# Patient Record
Sex: MALE | Race: BLACK OR AFRICAN AMERICAN | Employment: OTHER | URBAN - METROPOLITAN AREA
[De-identification: names, ages, dates, MRNs, and addresses within clinical notes are randomized per-mention and may not be internally consistent; named-entity substitution may affect disease eponyms.]

---

## 2017-02-19 ENCOUNTER — HOSPITAL ENCOUNTER (EMERGENCY)
Age: 77
Discharge: HOME | End: 2017-02-19
Attending: EMERGENCY MEDICINE
Payer: MEDICARE

## 2017-02-19 ENCOUNTER — APPOINTMENT (OUTPATIENT)
Dept: GENERAL RADIOLOGY | Age: 77
End: 2017-02-19
Attending: EMERGENCY MEDICINE
Payer: MEDICARE

## 2017-02-19 VITALS
HEIGHT: 70 IN | DIASTOLIC BLOOD PRESSURE: 81 MMHG | SYSTOLIC BLOOD PRESSURE: 161 MMHG | RESPIRATION RATE: 20 BRPM | BODY MASS INDEX: 20.76 KG/M2 | WEIGHT: 145 LBS | OXYGEN SATURATION: 98 % | TEMPERATURE: 98.1 F | HEART RATE: 88 BPM

## 2017-02-19 DIAGNOSIS — S42.402A OCCULT FRACTURE OF ELBOW, LEFT, CLOSED, INITIAL ENCOUNTER: Primary | ICD-10-CM

## 2017-02-19 PROCEDURE — 99283 EMERGENCY DEPT VISIT LOW MDM: CPT

## 2017-02-19 PROCEDURE — 74011250637 HC RX REV CODE- 250/637: Performed by: EMERGENCY MEDICINE

## 2017-02-19 PROCEDURE — 73080 X-RAY EXAM OF ELBOW: CPT

## 2017-02-19 PROCEDURE — 75810000053 HC SPLINT APPLICATION

## 2017-02-19 RX ORDER — HYDROCODONE BITARTRATE AND ACETAMINOPHEN 5; 325 MG/1; MG/1
1 TABLET ORAL
Status: COMPLETED | OUTPATIENT
Start: 2017-02-19 | End: 2017-02-19

## 2017-02-19 RX ORDER — HYDROCODONE BITARTRATE AND ACETAMINOPHEN 5; 325 MG/1; MG/1
TABLET ORAL
Qty: 16 TAB | Refills: 0 | Status: SHIPPED | OUTPATIENT
Start: 2017-02-19 | End: 2019-04-05

## 2017-02-19 RX ADMIN — HYDROCODONE BITARTRATE AND ACETAMINOPHEN 1 TABLET: 5; 325 TABLET ORAL at 14:00

## 2017-02-19 NOTE — ED TRIAGE NOTES
Price on Wednesday landing on left elbow with increasing pain and decreasing range of motion noted to left elbow and arm. Strong radial pulse.

## 2017-02-19 NOTE — LETTER
NOTIFICATION RETURN TO WORK / SCHOOL 
 
2/19/2017 2:23 PM 
 
Mr. Drinda Dance [address] [place] [id number] To Whom It May Concern: 
 
Drinda Dance is currently under the care of [address] [facility] EMERGENCY DEPT. He will return to work/school on: 2/21/17 If there are questions or concerns please have the patient contact our office. Sincerely, 
 
 
Dr. Chris Merino

## 2017-02-19 NOTE — DISCHARGE INSTRUCTIONS
Broken Elbow: Care Instructions  Your Care Instructions  A fractured elbow means that a bone has broken in or near the joint. Broken bones (fractures) can range from a small, hairline crack, to a bone or bones broken into two or more pieces. Your treatment depends on how bad the break is. Your doctor may have put your arm in a cast or splint to allow your elbow to heal or to keep it stable until you see another doctor. You also may wear a sling to help support your arm. It may take weeks or months for your elbow to heal. You can help it heal with some care at home. You heal best when you take good care of yourself. Eat a variety of healthy foods, and don't smoke. You may have had a sedative to help you relax. You may be unsteady after having sedation. It can take a few hours for the medicine's effects to wear off. Common side effects of sedation include nausea, vomiting, and feeling sleepy or tired. The doctor has checked you carefully, but problems can develop later. If you notice any problems or new symptoms, get medical treatment right away. Follow-up care is a key part of your treatment and safety. Be sure to make and go to all appointments, and call your doctor if you are having problems. It's also a good idea to know your test results and keep a list of the medicines you take. How can you care for yourself at home? · If the doctor gave you a sedative:  ¨ For 24 hours, don't do anything that requires attention to detail. It takes time for the medicine's effects to completely wear off. ¨ For your safety, do not drive or operate any machinery that could be dangerous. Wait until the medicine wears off and you can think clearly and react easily. · Put ice or a cold pack on your arm for 10 to 20 minutes at a time. Try to do this every 1 to 2 hours for the next 3 days (when you are awake). Put a thin cloth between the ice and your cast or splint. Keep your cast or splint dry.   · Follow the cast care instructions your doctor gives you. If you have a splint, do not take it off unless your doctor tells you to. · Be safe with medicines. Take pain medicines exactly as directed. ¨ If the doctor gave you a prescription medicine for pain, take it as prescribed. ¨ If you are not taking a prescription pain medicine, ask your doctor if you can take an over-the-counter medicine. · Prop up your arm on pillows when you sit or lie down in the first few days after the injury. Keep your elbow higher than the level of your heart. This will help reduce swelling. · Follow instructions for exercises to keep your arm strong. · Wiggle your fingers and wrist often to reduce swelling and stiffness. When should you call for help? Call 911 anytime you think you may need emergency care. For example, call if:  · You have trouble breathing. · You passed out (lost consciousness). Call your doctor now or seek immediate medical care if:  · You have new or worse nausea or vomiting. · You have increased or severe pain. · Your hand is cool or pale or changes color. · You have tingling, weakness, or numbness in your hand or fingers. · Your cast or splint feels too tight. · You cannot move your fingers or have trouble moving your fingers. · The skin under your cast or splint is burning or stinging. Watch closely for changes in your health, and be sure to contact your doctor if:  · You do not get better as expected. Where can you learn more? Go to http://carolyn-nicole.info/. Enter G763 in the search box to learn more about \"Broken Elbow: Care Instructions. \"  Current as of: May 23, 2016  Content Version: 11.1  © 4570-7505 [employer]. Care instructions adapted under license by [employer] (which disclaims liability or warranty for this information).  If you have questions about a medical condition or this instruction, always ask your healthcare professional. Tito Fulton disclaims any warranty or liability for your use of this information.

## 2017-02-19 NOTE — ED PROVIDER NOTES
HPI Comments: 1:35 PM Tangela Lewis is a 68 y.o. male with h/o HTN, arthritis, high cholesterol and chronic constipation who presents to ED complaining of R hand and L elbow pain onset yesterday. He notes some R hand swelling onset yesterday. He states the R hand pain subsided today. Pt states he only has L elbow pain when he moves his L arm. 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 He states he slipped on the tile in the bathroom and landed on his L side. He denies hitting his head or LOC. Pt took Tylenol this morning w/o relief. Pt denies dizziness, lightheadedness, CP, SOB, back pain or neck pain. No other concerns nor complaints at this time. PCP: Mary Arce MD      The history is provided by the patient. Past Medical History:   Diagnosis Date    Arthritis     Chronic constipation     High cholesterol     Hypertension        Past Surgical History:   Procedure Laterality Date    Hx heent           No family history on file. Social History     Social History    Marital status:      Spouse name: N/A    Number of children: N/A    Years of education: N/A     Occupational History    Not on file. Social History Main Topics    Smoking status: Former Smoker    Smokeless tobacco: Not on file    Alcohol use No    Drug use: No    Sexual activity: Not on file     Other Topics Concern    Not on file     Social History Narrative    No narrative on file         ALLERGIES: Review of patient's allergies indicates no known allergies. Review of Systems   Constitutional: Negative for chills, fatigue, fever and unexpected weight change. HENT: Negative for congestion and rhinorrhea. Respiratory: Negative for chest tightness and shortness of breath. Cardiovascular: Negative for chest pain, palpitations and leg swelling. Gastrointestinal: Negative for abdominal pain, nausea and vomiting. Genitourinary: Negative for dysuria. Musculoskeletal: Negative for back pain. R hand swelling, R hand and L elbow pain   Skin: Negative for rash. Neurological: Negative for dizziness and weakness. Psychiatric/Behavioral: The patient is not nervous/anxious. All other systems reviewed and are negative. There were no vitals filed for this visit. Physical Exam   Constitutional: He appears well-developed and well-nourished. Non-toxic appearance. He does not have a sickly appearance. He does not appear ill. No distress. HENT:   Head: Normocephalic and atraumatic. Mouth/Throat: Oropharynx is clear and moist. No oropharyngeal exudate. Eyes: Conjunctivae and EOM are normal. Pupils are equal, round, and reactive to light. No scleral icterus. Neck: Normal range of motion. Neck supple. No hepatojugular reflux and no JVD present. No tracheal deviation present. No thyromegaly present. Cardiovascular: Normal rate, regular rhythm, S1 normal, S2 normal, normal heart sounds, intact distal pulses and normal pulses. Exam reveals no gallop, no S3 and no S4. No murmur heard. Pulses:       Radial pulses are 2+ on the right side, and 2+ on the left side. Dorsalis pedis pulses are 2+ on the right side, and 2+ on the left side. Pulmonary/Chest: Effort normal and breath sounds normal. No accessory muscle usage. No tachypnea. No respiratory distress. He has no decreased breath sounds. He has no wheezes. He has no rhonchi. He has no rales. Abdominal: Soft. Normal appearance and bowel sounds are normal. He exhibits no distension and no mass. There is no hepatosplenomegaly. There is no tenderness. There is no rigidity, no rebound, no guarding, no CVA tenderness, no tenderness at McBurney's point and negative North's sign. Musculoskeletal:        Left elbow: He exhibits decreased range of motion, swelling and effusion. He exhibits no deformity and no laceration. Tenderness found. Olecranon process tenderness noted.  No radial head, no medial epicondyle and no lateral epicondyle tenderness noted. Strength 5/5 throughout with exception of left elbow extension secondary to pain    Lymphadenopathy:        Head (right side): No submental, no submandibular, no preauricular and no occipital adenopathy present. Head (left side): No submental, no submandibular, no preauricular and no occipital adenopathy present. He has no cervical adenopathy. Right: No supraclavicular adenopathy present. Left: No supraclavicular adenopathy present. Neurological: He is alert. He has normal strength and normal reflexes. He is not disoriented. No cranial nerve deficit or sensory deficit. Coordination and gait normal. GCS eye subscore is 4. GCS verbal subscore is 5. GCS motor subscore is 6. Grossly intact    Skin: Skin is warm, dry and intact. No rash noted. He is not diaphoretic. Psychiatric: He has a normal mood and affect. His speech is normal and behavior is normal. Judgment and thought content normal. Cognition and memory are normal.   Nursing note and vitals reviewed. MDM  Number of Diagnoses or Management Options  Occult fracture of elbow, left, closed, initial encounter:   Diagnosis management comments: Elbow contusion vs fracture     ED Course       Procedures   XR of the L elbow showed posterior and anterior sail sign. Consistent with an occult fracture. Splint was applied to the L elbow by ED tech. Splint was checked after application. Extremity neurovascular intact prior and after splint placement. I have reassessed the patient. Patient is feeling better. Patient will be prescribed Vicodin. Patient was discharged in stable condition. Patient is to return to emergency department if any new or worsening condition.       Scribe Attestation  Mallika Smith scribing for and in the presence of Reza Avery DO (02/19/17/ 1:35 PM)    Physician Attestation  I personally performed the services described in this documentation, reviewed, and edited the documentation which was dictated to the scribe in my presence, and it accurately records my own words and actions.      Johny Mason DO (02/19/17/ 1:35 PM)    Signed by: William Mccord, 02/19/17, 1:35 PM

## 2017-02-21 ENCOUNTER — APPOINTMENT (OUTPATIENT)
Dept: GENERAL RADIOLOGY | Age: 77
End: 2017-02-21
Attending: EMERGENCY MEDICINE
Payer: MEDICARE

## 2017-02-21 ENCOUNTER — HOSPITAL ENCOUNTER (EMERGENCY)
Age: 77
Discharge: HOME | End: 2017-02-21
Attending: EMERGENCY MEDICINE
Payer: MEDICARE

## 2017-02-21 VITALS
BODY MASS INDEX: 22.76 KG/M2 | SYSTOLIC BLOOD PRESSURE: 142 MMHG | DIASTOLIC BLOOD PRESSURE: 88 MMHG | HEART RATE: 92 BPM | RESPIRATION RATE: 18 BRPM | OXYGEN SATURATION: 95 % | HEIGHT: 67 IN | TEMPERATURE: 98.4 F | WEIGHT: 145 LBS

## 2017-02-21 DIAGNOSIS — S62.92XA HAND FRACTURE, LEFT, CLOSED, INITIAL ENCOUNTER: Primary | ICD-10-CM

## 2017-02-21 PROCEDURE — 73130 X-RAY EXAM OF HAND: CPT

## 2017-02-21 PROCEDURE — 99283 EMERGENCY DEPT VISIT LOW MDM: CPT

## 2017-02-21 PROCEDURE — 75810000053 HC SPLINT APPLICATION

## 2017-02-21 PROCEDURE — 74011250637 HC RX REV CODE- 250/637: Performed by: EMERGENCY MEDICINE

## 2017-02-21 RX ORDER — HYDROCODONE BITARTRATE AND ACETAMINOPHEN 5; 325 MG/1; MG/1
1 TABLET ORAL
Status: COMPLETED | OUTPATIENT
Start: 2017-02-21 | End: 2017-02-21

## 2017-02-21 RX ADMIN — HYDROCODONE BITARTRATE AND ACETAMINOPHEN 1 TABLET: 5; 325 TABLET ORAL at 16:44

## 2017-02-21 NOTE — ED TRIAGE NOTES
Patient states swelling to left hand. States prior visit related to pain to left elbow. Patient arrives with sling in place to left arm. Patient educated on proper sling placement. Swelling noted to hand.

## 2017-02-21 NOTE — ED PROVIDER NOTES
Patient is a 68 y.o. male presenting with hand swelling. The history is provided by the patient and the spouse. Hand Swelling    Pertinent negatives include no numbness. pt is c/o persistent left hand painful swelling since falling at [employer] 2/18/17. Left elbow fracture diagnosed at ED visit but hand still symptomatic. Hasn't been able to f/up with ortho yet b/c of [employer] manager and insurance not communicating properly yet. Incident report filed. In sling and left elbow feels better since splint placed. Pain is primarily at thumb, MCP joint. Left pinky PIP joint disfigured from long ago dog leash accident. Denies additional trauma, rash. Has Ibuprofen and Vicodin. Denies ETOH, tobacco, illicits. Past Medical History:   Diagnosis Date    Arthritis     Chronic constipation     High cholesterol     Hypertension        Past Surgical History:   Procedure Laterality Date    Hx heent           History reviewed. No pertinent family history. Social History     Social History    Marital status:      Spouse name: N/A    Number of children: N/A    Years of education: N/A     Occupational History    Not on file. Social History Main Topics    Smoking status: Former Smoker    Smokeless tobacco: Not on file    Alcohol use No    Drug use: No    Sexual activity: Not on file     Other Topics Concern    Not on file     Social History Narrative         ALLERGIES: Review of patient's allergies indicates no known allergies. Review of Systems   Constitutional: Negative for fever. Musculoskeletal: Positive for arthralgias and joint swelling. Neurological: Negative for weakness and numbness. All other systems reviewed and are negative.       Vitals:    02/21/17 1446   BP: 142/88   Pulse: 92   Resp: 18   Temp: 98.4 °F (36.9 °C)   SpO2: 95%   Weight: 65.8 kg (145 lb)   Height: 5' 7\" (1.702 m)            Physical Exam   Constitutional: Vital signs are normal. He appears well-developed and well-nourished. He is active. Non-toxic appearance. He does not appear ill. No distress. HENT:   Head: Normocephalic and atraumatic. Neck: Normal range of motion. Neck supple. Carotid bruit is not present. No tracheal deviation present. No thyromegaly present. Cardiovascular: Normal rate, regular rhythm and normal heart sounds. Exam reveals no gallop and no friction rub. No murmur heard. Pulmonary/Chest: Effort normal and breath sounds normal. No stridor. No respiratory distress. He has no wheezes. He has no rales. He exhibits no tenderness. Abdominal: Soft. He exhibits no distension and no mass. There is no tenderness. There is no rebound, no guarding and no CVA tenderness. Musculoskeletal: He exhibits edema and tenderness. Left hand: dorsal distal first metacarpal TTP. Diminished ROM at this joint. Cap refill <2 sec; sensation intact throughout. Moderate swelling. Neurological: He is alert. Skin: Skin is warm, dry and intact. He is not diaphoretic. No pallor. Psychiatric: He has a normal mood and affect. His speech is normal and behavior is normal. Judgment and thought content normal.   Nursing note and vitals reviewed. MDM  Number of Diagnoses or Management Options  Hand fracture, left, closed, initial encounter:   Diagnosis management comments: Differential: fx; dislocation; contusion; sprain; tendon injury    Splint; distal first metacarpal fx on film. Refer to ortho. Splint applied by tech and nurse to left hand; excellent position. N/v intact before and after application. Amount and/or Complexity of Data Reviewed  Tests in the radiology section of CPT®: ordered and reviewed      ED Course       Procedures    4:04 PM  Diagnosis:   1.  Hand fracture, left, closed, initial encounter          Disposition: home    Follow-up Information     Follow up With Details Comments [address] Schedule an appointment as soon as possible for a visit in 1 day  [address] [place] [address] EMERGENCY DEPT  If symptoms worsen return immediately [address] [facility]  [phone]          Patient's Medications   Start Taking    No medications on file   Continue Taking    ASPIRIN 81 MG CHEWABLE TABLET    Take 81 mg by mouth daily. HYDROCODONE-ACETAMINOPHEN (NORCO) 5-325 MG PER TABLET    Take 1-2 tablets PO every 4-6 hours as needed for pain control. If over the counter ibuprofen or acetaminophen was suggested, then only take the vicodin for pain not well controlled with the over the counter medication. LOSARTAN-HYDROCHLOROTHIAZIDE (HYZAAR) 100-25 MG PER TABLET    Take 1 tablet by mouth daily. Indications: HYPERTENSION    PRAVASTATIN (PRAVACHOL) 40 MG TABLET    Take 40 mg by mouth nightly.  Indications: HYPERCHOLESTEROLEMIA   These Medications have changed    No medications on file   Stop Taking    No medications on file

## 2017-02-22 ENCOUNTER — OFFICE VISIT (OUTPATIENT)
Dept: ORTHOPEDIC SURGERY | Age: 77
End: 2017-02-22

## 2017-02-22 VITALS
WEIGHT: 145 LBS | BODY MASS INDEX: 22.76 KG/M2 | DIASTOLIC BLOOD PRESSURE: 79 MMHG | SYSTOLIC BLOOD PRESSURE: 165 MMHG | HEIGHT: 67 IN | TEMPERATURE: 97.8 F | HEART RATE: 83 BPM

## 2017-02-22 DIAGNOSIS — S63.92XD HAND SPRAIN, LEFT, SUBSEQUENT ENCOUNTER: ICD-10-CM

## 2017-02-22 DIAGNOSIS — S52.125A CLOSED NONDISPLACED FRACTURE OF HEAD OF LEFT RADIUS, INITIAL ENCOUNTER: Primary | ICD-10-CM

## 2017-02-22 DIAGNOSIS — M19.042 PRIMARY OSTEOARTHRITIS OF LEFT HAND: ICD-10-CM

## 2017-02-22 NOTE — PROGRESS NOTES
Drinda Dance  1940   Chief Complaint   Patient presents with    Elbow Pain     left    Hand Pain     left        HISTORY OF PRESENT ILLNESS  Drinda Dance is a 68 y.o. male who presents today for evaluation of left hand pain and swelling and left elbow fracture. He rates his pain 2/10 today. He recalls he fell on 2/15/17 but states he didn't have pain and swelling until 2/18. He was seen in the ED where XRs were taken and a splint was applied. He also c/o pain and swelling in the left wrist.    No Known Allergies     Past Medical History:   Diagnosis Date    Arthritis     Chronic constipation     High cholesterol     Hypertension       Social History     Social History    Marital status:      Spouse name: N/A    Number of children: N/A    Years of education: N/A     Occupational History    Not on file. Social History Main Topics    Smoking status: Former Smoker    Smokeless tobacco: Not on file    Alcohol use No    Drug use: No    Sexual activity: Not on file     Other Topics Concern    Not on file     Social History Narrative      Past Surgical History:   Procedure Laterality Date    HX HEENT        History reviewed. No pertinent family history. Current Outpatient Prescriptions   Medication Sig    HYDROcodone-acetaminophen (NORCO) 5-325 mg per tablet Take 1-2 tablets PO every 4-6 hours as needed for pain control. If over the counter ibuprofen or acetaminophen was suggested, then only take the vicodin for pain not well controlled with the over the counter medication.  losartan-hydrochlorothiazide (HYZAAR) 100-25 mg per tablet Take 1 tablet by mouth daily. Indications: HYPERTENSION    pravastatin (PRAVACHOL) 40 mg tablet Take 40 mg by mouth nightly. Indications: HYPERCHOLESTEROLEMIA    aspirin 81 mg chewable tablet Take 81 mg by mouth daily. No current facility-administered medications for this visit.         REVIEW OF SYSTEM   Patient denies: Weight loss, Fever/Chills, HA, Visual changes, Fatigue, Chest pain, SOB, Abdominal pain, N/V/D/C, Blood in stool or urine, Edema. Pertinent positive as above in HPI. All others were negative    PHYSICAL EXAM:   Visit Vitals    /79    Pulse 83    Temp 97.8 °F (36.6 °C)    Ht 5' 7\" (1.702 m)    Wt 145 lb (65.8 kg)    BMI 22.71 kg/m2     The patient is a well-developed, well-nourished male   in no acute distress. The patient is alert and oriented times three. The patient is alert and oriented times three. Mood and affect are normal.  LYMPHATIC: lymph nodes are not enlarged and are within normal limits  SKIN: normal in color and non tender to palpation. There are no bruises or abrasions noted. NEUROLOGICAL: Motor sensory exam is within normal limits. Reflexes are equal bilaterally. There is normal sensation to pinprick and light touch  MUSCULOSKELETAL:  Examination Left Hand   Skin Intact   Deformity -   Swelling ++   Tenderness -   Tenderness A1 Pulley -   Finger flexion limited   Finger extension limited   Thenar [place] Atrophy -   Sensation Normal   Capillary refill -   Heberden's nodes -   Dupuytren's -     Examination Left Wrist   Skin Intact   Tenderness +   Flexion 30   Extension 30   Deformity -   Effusion -   Tinnel's sign -   Phalen's test -   Finklestein maneuver -   Pain with thumb abduction -       Examination Left Elbow   Skin Intact   Range of Motion 30-60   Tenderness Medial Epicondyle -   Tenderness Lateral Epicondyle +   Tenderness Olecranon Bursa -   Swelling +   Bruising +   Stability Normal   Motor Strength  Normal   Neurovascular Intact         IMAGING:   XR of the left hand dated 2/21/17 was reviewed and read:   IMPRESSION:   No acute fracture. Joint space narrowing and articular surface irregularity with  some erosions at the first metacarpophalangeal joint, differential include  inflammatory/crystalline or infectious arthropathy or possibly remote  posttraumatic osteoarthrosis.  Less pronounced arthritic changes in the second  MCP joint which is probably similar process. Diffuse soft tissue swelling. No  soft tissue gas. Please correlate clinically for possibility of septic arthritis  at the first MCP joint. Chondrocalcinosis in the wrist may be related to chronic osteoarthrosis or CPPD. XR of the left elbow dated 2/19/17 was reviewed and read:   IMPRESSION[de-identified] Fracture radial head with presumed hemarthrosis. Tiny ossific density posteriorly at the olecranon process could be a tiny  avulsion injury or related to tendinosis. Dorsal soft tissue swelling. IMPRESSION:      ICD-10-CM ICD-9-CM    1. Closed nondisplaced fracture of head of left radius, initial encounter S52.125A 813.05 REFERRAL TO OCCUPATIONAL THERAPY   2. Hand sprain, left, subsequent encounter S63. 92XD V58.89      842.10    3. Hand arthritis M12.9 716.94         PLAN: He will wear a sling intermittently for the next week. He was instructed to also ice and elevate the elbow and wrist. I will send him to OT to work on passive and active ROM. I will see him back in three weeks for reevaluation.   Follow-up Disposition: Not on File    Scribed by [facility]) as dictated by MD Kyleigh Goldsmith M.D.   [facility]

## 2017-02-23 ENCOUNTER — TELEPHONE (OUTPATIENT)
Dept: ORTHOPEDIC SURGERY | Age: 77
End: 2017-02-23

## 2017-02-23 NOTE — TELEPHONE ENCOUNTER
Zak Edgar (wokers comp ) is calling to check the status to see if pt has been released to go back to work or not, the notes he was sent do not say.

## 2017-02-24 ENCOUNTER — TELEPHONE (OUTPATIENT)
Dept: ORTHOPEDIC SURGERY | Age: 77
End: 2017-02-24

## 2017-02-24 ENCOUNTER — HOSPITAL ENCOUNTER (OUTPATIENT)
Dept: PHYSICAL THERAPY | Age: 77
Discharge: HOME | End: 2017-02-24
Payer: COMMERCIAL

## 2017-02-24 PROCEDURE — 97165 OT EVAL LOW COMPLEX 30 MIN: CPT

## 2017-02-24 PROCEDURE — 97110 THERAPEUTIC EXERCISES: CPT

## 2017-02-24 NOTE — PROGRESS NOTES
[facility]  [facility] COMPANY OF [address]  [phone] fax    Plan of Care/Statement of Necessity for Occupational Therapy Services    Patient name: Suze Greenberg Start of Care: 2017   Referral source: Timothy Gordon : 1940    Medical Diagnosis: Nondisplaced fracture of head of left radius, initial encounter for closed fracture [S52.125A]   Onset Date:2/15/17    Treatment Diagnosis: Pain , decreased ROm L elbow   Prior Hospitalization: see medical history Provider#: [id number]   Medications: Verified on Patient summary List    Comorbidities: Arthritis, L thumb fracture, HTN   Prior Level of Function: I self care home care driving, reading, Tv, music, works at [employer], stocking, hanging signs, climbing ladders          The Plan of Care and following information is based on the information from the initial evaluation. Assessment/ key information: 68year old RHD male who fell on floor at work resulting in non-displaced radial head fracture on 2/15/17. He was casted until 17. He presents with elbow AROM of , forearm supination 70, pronation 70, wrist flexion 50, extension 33, radial deviation 18, ulnar deviation 25. He reports pain 6-7/10 with movement. He had difficulty performing self care activities using L hand, is not driving or performing usual home care tasks. His FOTO is 16/100 noting very severe impairment in function. He will benefit from skilled occupational therapy to improve LUE function to allow for return to work and self care tasks.        Evaluation Complexity: History LOW Complexity : Brief history review  Examination LOW Complexity : 1-3 performance deficits relating to physical, cognitive , or psychosocial skils that result in activity limitations and / or participation restrictions  Clinical Decision Making LOW Complexity : No comorbidities that affect functional and no verbal or physical assistance needed to complete eval tasks   Overall Complexity Rating: LOW     Problem List: Pain effecting function, Decreased range of motion, Decreased strength, Edema effecting function, Decreased ADL/functional abilities , Decreased activity tolerance and Decreased flexibility/joint mobility     Treatment Plan may include any combination of the following: Therapeutic exercise, Therapeutic activities, Physical agent/modality, Manual therapy, Patient education and ADLs/IADLs    Patient / Family readiness to learn indicated by: asking questions, trying to perform skills and interest    Persons(s) to be included in education:   patient (P)    Barriers to Learning/Limitations: None    Patient Goal (s): Relief of pain, get better    Patient Self Reported Health Status: good    Rehabilitation Potential: excellent    Short Term Goals: To be accomplished in 2 weeks:  Patient will be independent in home exercise program for wrist and elbow ROM. Patient will report pain 0-4/10 at worst with routine tasks. Patient will be able to perform own self care as previously done with L as assist.    Long Term Goals: To be accomplished in 12 treatments:   Patient will improve L elbow extension to at least -10  and L elbow flexion at least 30 degrees for washing hair and mopping. Patient will report pain 0-2/10 with routine daily tasks  Patient will increase wrist MENDEZ at least 20 degrees for positioning hand for cleaning tasks. Patient will improve hand use for home care as shown by increase in FOTO of at least 30 points.       Frequency / Duration: Patient to be seen 3 times per week for 12 treatments:    Patient/ Caregiver education and instruction: Diagnosis, prognosis, self care, activity modification and exercises   [x]  Plan of care has been reviewed with TERE Downs 2/24/2017 2:28 PM    _____________________________________________________________________    I certify that the above Therapy Services are being furnished while the patient is under my care. I agree with the treatment plan and certify that this therapy is necessary.     Physician's Signature:____________________  Date:____________Time:__________    Please sign and return to [facility]  [address]  [phone] fax

## 2017-02-24 NOTE — PROGRESS NOTES
OT DAILY TREATMENT NOTE  3-16    Patient Name: Mckenna Briggs  Date:2017  : 1940  [x]  Patient  Verified  Payor: /    In time:130  Out time:220  Total Treatment Time (min): 50  Visit #: 1 of 12    Treatment Area: Nondisplaced fracture of head of left radius, initial encounter for closed fracture [S52.650C]    SUBJECTIVE  Pain Level (0-10 scale): 6-7/10  Any medication changes, allergies to medications, adverse drug reactions, diagnosis change, or new procedure performed?: [x] No    [] Yes (see summary sheet for update)  Subjective functional status/changes:   [] No changes reported  It hurts when I move it     OBJECTIVE      20 min Therapeutic Exercise:  [] See flow sheet :   Rationale: increase ROM to improve the patients ability to use LUE  Wrist flex ext, RD/UD, sup pro x 10 L  Supine elbow flex ext x 10 with towel roll above elbow to increase extension      With   [x] TE   [] TA   [] neuro   [] other: Patient Education: [x] Review HEP    [x] Progressed/Changed HEP based on: Wrist flrearm elbow ROM[] positioning   [] body mechanics   [] transfers   [] heat/ice application   [] Splint wear/care   [] Sensory re-education   [] scar management      [] other:             Other Objective/Functional Measures:   Subjective: pt is a right hand dominant, 68 y.o.y/o, male who sustained his/her injury 2/15/17     Sheryl Sinclair on floor at work, went to ER (fracture of radial head), put in 1/2 cast until 17. Initially had swelling in L hand when casted, has resolved mostly now.   Prior level of function: Putting up signs, climbing ladders, machine use with floor, mopping, put up items, Tv, reading, music, outside  Pain level:(0-no pain 10-debilitating pain) severe    Description/Location: left elbow with c/o intermittent relief with rest   Worst pain10/10 Least pain 0/10   Activities which aggravate pain: bending elbow, pulling when fist   Activities which ease pain: ice, heat, pain meds  Current functional limitations/living situation: With wife, in apt, dressing, bathing,     Medical hx: HTN, arthritis R knee, R leg    Medications: See the written copy of this report in the patient's paper medical record. Objective:  Edema: Circumference    Right  Left   Olecranon Level 26cm  *28**cm     Range of Motion:Stiffness L shoulder in flexion and abduction-WFL     Active Passive     Norms Right Left Right Left   Shoulder Flex 0-180        Ext 0-60        abd 0-180        Horizontal add 0-45        IR 0-70        ER 0-90       Elbow Ext/flex 0-150 0-160      Forearm Supination 0-80  75      Pronation 0-80  70     Wrist Flex 0-80 60 50      Ext 0-70 50 33      Ulnar Dev 0-30 35 25      Radial Dev 0-20 20 18       Hand ROM Loose fist with index  Hand Strength:   Gross Grasp 3pt Pinch Lateral Pinch Tip Pinch   Right  65 14 16 8   Left         Nine-Hole Peg Test:  Left= __23___seconds  Right=___20__seconds  Finger Opposition: To 4th digit    ADLs  Feeding:        []MaxA   []ModA   [x]Art   [] CGA   []SBA   []Guerda   []Independent  UE Dressing:       []MaxA   [x]ModA   []Art   [] CGA   []SBA   []Guerda   []Independent  LE Dressing:       []MaxA   [x]ModA   []Art   [] CGA   []SBA   []Guerda   []Independent  Grooming:       []MaxA   []ModA   []Art   [] CGA   []SBA   []Guerda   [x]Independent  Toileting:       []MaxA   []ModA   []Art   [] CGA   []SBA   []Guerda   [x]Independent  Bathing:       []MaxA   [x]ModA   []Art   [] CGA   []SBA   []Guerda   []Independent  Light Meal Prep:    []MaxA   []ModA   []Art   [] CGA   []SBA   []Guerda   []Independent  Household/Other: []MaxA   []ModA   []Art   [] CGA   []SBA   []Guerda   []Independent  Adaptive Equip:     []MaxA   []ModA   []Art   [] CGA   []SBA   []Guerda   []Independent  Driving:       [x]MaxA   []ModA   []Art   [] CGA   []SBA   []Guerda   []Independent  Money Mgmt:        []MaxA   []ModA   []Art   [] CGA   []SBA   []Guerda   []Independent       Pain Level (0-10 scale) post treatment: 0/10    ASSESSMENT/Changes in Function:    [x]  See Plan of Care  []  See progress note/recertification  []  See Discharge Summary           PLAN  []  Upgrade activities as tolerated     []  Continue plan of care  []  Update interventions per flow sheet       []  Discharge due to:_  []  Other:_      NAZ James/L 2/24/2017  1:19 PM    Future Appointments  Date Time Provider Emmie Easti   2/24/2017 1:30 PM NAZ James/MANOJ MMCPTPB [facility]   3/15/2017 11:00 AM Aileen Viera, [address] [facility]

## 2017-02-24 NOTE — LETTER
NOTIFICATION RETURN TO WORK / SCHOOL 
 
2/24/2017 1:10 PM 
 
Mr. Dominic Lewis [address] [facility] [address] To Whom It May Concern: 
 
Dominic Lewis is currently under the care of [address] [place] [address] [place]. He is on a no duty status until after his follow up on 3-15-17. If there are questions or concerns please have the patient contact our office. Sincerely, Candido Cooper MD

## 2017-02-27 ENCOUNTER — DOCUMENTATION ONLY (OUTPATIENT)
Dept: ORTHOPEDIC SURGERY | Age: 77
End: 2017-02-27

## 2017-02-27 NOTE — PROGRESS NOTES
Records request received from [employer] 2-27-17, faxed to [facility] at [facility] [place]

## 2017-03-01 ENCOUNTER — HOSPITAL ENCOUNTER (OUTPATIENT)
Dept: PHYSICAL THERAPY | Age: 77
Discharge: HOME | End: 2017-03-01
Payer: COMMERCIAL

## 2017-03-01 PROCEDURE — 97110 THERAPEUTIC EXERCISES: CPT

## 2017-03-01 NOTE — PROGRESS NOTES
OT DAILY TREATMENT NOTE  3-16    Patient Name: Angy Trujillo  Date:3/1/2017  : 1940  [x]  Patient  Verified  Payor: [address] [place] [address] Road / Plan: [employer] / Product Type: Workers Comp /    In [employer] time:1055  Total Treatment Time (min): 33  Visit #: 2 of 12    Treatment Area: Nondisplaced fracture of head of left radius, initial encounter for closed fracture [S52.327A]    SUBJECTIVE  Pain Level (0-10 scale): 4-5/10  Any medication changes, allergies to medications, adverse drug reactions, diagnosis change, or new procedure performed?: [x] No    [] Yes (see summary sheet for update)  Subjective functional status/changes:   [] No changes reported  I accidentally banged my elbow the other day. OBJECTIVE      33 min Therapeutic Exercise:  [] See flow sheet :   Rationale: increase ROM and increase strength to improve the patients ability to use LUE    L Elbow Flexion/Extension Recheck    HEP Review:  LSupination/Pronation: 10x  L Wrist Flexion (Fist) 10x  L Wrsit Extension (Fist) 10x  L Ulnar/Radial Deviation 10x   L Towel Scrunch: 10x  L Elbow Flexion in Supine: 10x with towel roll above elbow to increase extension     Added:    Hand Bike: Level 1, 3 min    Wrist Mazes:   Pt. Used L Hand to manipulate Easy Wrist Maze 5x   Pt. Used L Hand to manipulate Hard Wrist Maze 5x      With   [x] TE   [] TA   [] neuro   [] other: Patient Education: [x] Review HEP    [] Progressed/Changed HEP based on:   [] positioning   [] body mechanics   [] transfers   [] heat/ice application   [] Splint wear/care   [] Sensory re-education   [] scar management      [x] other: Advised Pt. To slow down with exercises, make sure to hold stretch            Other Objective/Functional Measures:     L Elbow Extension/Flexion:  () +40     Pt. Required minimal cueing for HEP.     Pain Level (0-10 scale) post treatment: 3-4/10    ASSESSMENT/Changes in Function:     Improving L Elbow ROM    Patient will continue to benefit from skilled OT services to modify and progress therapeutic interventions, address ROM deficits, address strength deficits, analyze and address soft tissue restrictions and instruct in home and community integration to attain remaining goals. []  See Plan of Care  []  See progress note/recertification  []  See Discharge Summary         Progress towards goals / Updated goals: To be accomplished in 2 weeks:  Patient will be independent in home exercise program for wrist and elbow ROM. Progressing, Pt. Required minimal cueing. 3/1/17  Patient will report pain 0-4/10 at worst with routine tasks. Progressing, Session ended with pain level at 3-4/10. 3/1/17  Patient will be able to perform own self care as previously done with L as assist.     Long Term Goals: To be accomplished in 12 treatments:   Patient will improve L elbow extension to at least -10 and L elbow flexion at least 30 degrees for washing hair and mopping. Met for flexion 3/1/17  Patient will report pain 0-2/10 with routine daily tasks  Patient will increase wrist MENDEZ at least 20 degrees for positioning hand for cleaning tasks. Patient will improve hand use for home care as shown by increase in FOTO of at least 30 points.      PLAN  [x]  Upgrade activities as tolerated     [x]  Continue plan of care  []  Update interventions per flow sheet       []  Discharge due to:_  []  Other:_      [place] 3/1/2017  10:19 AM  Brian Cedeno OTR/L    Future Appointments  Date Time Provider Emmie Up   3/1/2017 10:30 AM Stefani Coto OTR/L [facility]   3/3/2017 10:00 AM Clotilde Hernández [facility]   3/6/2017 10:00 AM Stefani Coto OTR/L [facility]   3/8/2017 10:00 AM Stefani Coto, OTR/L [facility]   3/10/2017 10:30 AM Jignesh Kaur QUDALHC [facility]   3/13/2017 10:30 AM Jignesh Kaur EQMNCYJ [facility]   3/15/2017 9:00 AM Jignesh [facility]PTPB [facility]   3/15/2017 11:00 AM MATTY Hendrix [facility]   3/17/2017 10:30 AM Jignesh STUART [facility]   3/20/2017 9:30 AM Aron Dies, OTR/L [facility]   3/22/2017 9:30 AM Aron Turner, OTR/L [facility]

## 2017-03-03 ENCOUNTER — HOSPITAL ENCOUNTER (OUTPATIENT)
Dept: PHYSICAL THERAPY | Age: 77
Discharge: HOME | End: 2017-03-03
Payer: COMMERCIAL

## 2017-03-03 PROCEDURE — 97110 THERAPEUTIC EXERCISES: CPT

## 2017-03-03 PROCEDURE — 97018 PARAFFIN BATH THERAPY: CPT

## 2017-03-03 NOTE — PROGRESS NOTES
OT DAILY TREATMENT NOTE  3    Patient Name: Kaleigh Damon  Date:3/3/2017  : 1940  [x]  Patient  Verified  Payor: [address] [place] [address] Road / Plan: Adrien Magallanes / Product Type:  Workers Comp /    In time: 10:00  Out time:10:30  Total Treatment Time (min): 30  Visit #: 3 of 12    Treatment Area: Nondisplaced fracture of head of left radius, initial encounter for closed fracture [S52.833A]    SUBJECTIVE  Pain Level (0-10 scale): 4/10 in elbow and thumb   Any medication changes, allergies to medications, adverse drug reactions, diagnosis change, or new procedure performed?: [x] No    [] Yes (see summary sheet for update)  Subjective functional status/changes:   [] No changes reported  My thumb has been hurting     OBJECTIVE    Modality rationale: decrease pain to improve the patients ability to grasp    Min Type Additional Details    [] Estim:  []Unatt       []IFC  []Premod                        []Other:  []w/ice   []w/heat  Position:  Location:    [] Estim: []Att    []TENS instruct  []NMES                    []Other:  []w/US   []w/ice   []w/heat  Position:  Location:    []  Traction: [] Cervical       []Lumbar                       [] Prone          []Supine                       []Intermittent   []Continuous Lbs:  [] before manual  [] after manual    []  Ultrasound: []Continuous   [] Pulsed                           []1MHz   []3MHz W/cm2:  Location:    []  Iontophoresis with dexamethasone         Location: [] Take home patch   [] In clinic    []  Ice     []  heat  []  Ice massage  []  Laser   []  Anodyne Position:  Location:   10 []  Laser with stim  [x]  Other: paraffin  Position: L hand   Location:    []  Vasopneumatic Device Pressure:       [] lo [] med [] hi   Temperature: [] lo [] med [] hi   [x] Skin assessment post-treatment:  [x]intact []redness- no adverse reaction    []redness  adverse reaction:     20 min Therapeutic Exercise:  [] See flow sheet :   Rationale: increase ROM and increase strength to improve the patients ability to grasp and use elbow without limitations   Table slides abb.and abd. And elbow flexion  Wrist mazes hard x 6   Wrist exercise   Dart throwing x15 with yellow thera-bar       With   [] TE   [] TA   [] neuro   [] other: Patient Education: [x] Review HEP    [] Progressed/Changed HEP based on:   [] positioning   [] body mechanics   [] transfers   [] heat/ice application   [] Splint wear/care   [] Sensory re-education   [] scar management      [] other:             Other Objective/Functional Measures: decrease pain form 4/10  To 0/10 in wrist and thumb after paraffin      Pain Level (0-10 scale) post treatment: 0/10    ASSESSMENT/Changes in Function: increase wrist and elbow ROM     Patient will continue to benefit from skilled OT services to modify and progress therapeutic interventions, address ROM deficits, address strength deficits and instruct in home and community integration to attain remaining goals. [x]  See Plan of Care  []  See progress note/recertification  []  See Discharge Summary         Progress towards goals / Updated goals:   Patient will be independent in home exercise program for wrist and elbow ROM. Progressing, Pt. Required minimal cueing. 3/1/17  Patient will report pain 0-4/10 at worst with routine tasks. Progressing, Session ended with pain level at 3-4/10. 3/1/17  Patient will be able to perform own self care as previously done with L as assist.      Long Term Goals: To be accomplished in 12 treatments:   Patient will improve L elbow extension to at least -10 and L elbow flexion at least 30 degrees for washing hair and mopping. Met for flexion 3/1/17  Patient will report pain 0-2/10 with routine daily tasks  Patient will increase wrist MENDEZ at least 20 degrees for positioning hand for cleaning tasks. Patient will improve hand use for home care as shown by increase in FOTO of at least 30 points.    PLAN  [x]  Upgradead ctivities as tolerated     []  Continue plan of care  [x]  Update interventions per flow sheet       []  Discharge due to:_  []  Other:_      Suzy Danyelle 3/3/2017  10:14 AM    Future Appointments  Date Time Provider Emmie Up   3/6/2017 10:00 AM Asenath Rox, OTR/L [facility]   3/8/2017 10:00 AM Asenath Rox, OTR/L [facility]   3/10/2017 10:30 AM Parth Grewal CYVANESA [facility]   3/13/2017 10:30 AM Suzy Danyelle [facility]   3/15/2017 9:00 AM Suzy Danyelle [facility]   3/15/2017 11:00 AM MATTY Ayala Aamir [address]   3/17/2017 10:30 AM Suzymamie Bassett CWFUEOK [facility]   3/20/2017 9:30 AM Asenath Rox, OTR/L [facility]   3/22/2017 9:30 AM Asenath Rox, OTR/L [facility]

## 2017-03-06 ENCOUNTER — HOSPITAL ENCOUNTER (OUTPATIENT)
Dept: PHYSICAL THERAPY | Age: 77
Discharge: HOME | End: 2017-03-06
Payer: COMMERCIAL

## 2017-03-06 PROCEDURE — 97110 THERAPEUTIC EXERCISES: CPT

## 2017-03-06 PROCEDURE — 97018 PARAFFIN BATH THERAPY: CPT

## 2017-03-06 PROCEDURE — 97530 THERAPEUTIC ACTIVITIES: CPT

## 2017-03-06 NOTE — PROGRESS NOTES
OT DAILY TREATMENT NOTE - [facility]     Patient Name: Samy Bowden  Date:3/6/2017  : 1940  [x]  Patient  Verified  Payor: [facility] / Plan:  Husam / Product Type:  Workers Comp /    In time:1000  Out time:1040  Total Treatment Time (min): 40    Visit #: 4 of 12    Treatment Area: Nondisplaced fracture of head of left radius, initial encounter for closed fracture [S52.533A]    SUBJECTIVE  Pain Level (0-10 scale): 3/10  Any medication changes, allergies to medications, adverse drug reactions, diagnosis change, or new procedure performed?: [x] No    [] Yes (see summary sheet for update)  Subjective functional status/changes:   [] No changes reported  Elbow aching in Buddhism  i have arthritis in my hands anyway    OBJECTIVE    Modality rationale: decrease pain and increase tissue extensibility to improve the patients ability to grasp   Min Type Additional Details    [] Estim:  []Unatt       []IFC  []Premod                        []Other:  []w/ice   []w/heat  Position:  Location:    [] Estim: []Att    []TENS instruct  []NMES                    []Other:  []w/US   []w/ice   []w/heat  Position:  Location:    []  Traction: [] Cervical       []Lumbar                       [] Prone          []Supine                       []Intermittent   []Continuous Lbs:  [] before manual  [] after manual    []  Ultrasound: []Continuous   [] Pulsed                           []1MHz   []3MHz W/cm2:  Location:    []  Iontophoresis with dexamethasone         Location: [] Take home patch   [] In clinic    []  Ice     []  heat  []  Ice massage  []  Laser   []  Anodyne Position:  Location:     10 []  Laser with stim  [x]  Other: Paraffin Position:  Location:L hand    []  Vasopneumatic Device Pressure:       [] lo [] med [] hi   Temperature: [] lo [] med [] hi   [x] Skin assessment post-treatment:  [x]intact []redness- no adverse reaction    []redness  adverse reaction:     15 min Therapeutic Exercise:  [] See flow sheet : Rationale: increase ROM to improve the patients ability to grasp  Supinator wheel x 10 hold 5 each way L hand  Wrist mazes 6x each    15 min Therapeutic Activity:  []  See flow sheet :   Rationale: increase ROM and improve coordination  to improve the patients ability to manipulate items  Nuts and bolts assembly and disassembly with tools in board x 7 with L hand        With   [] TE   [] TA   [] neuro   [] other: Patient Education: [x] Review HEP    [] Progressed/Changed HEP based on:   [] positioning   [] body mechanics   [] transfers   [] heat/ice application   [] Splint wear/care   [] Sensory re-education   [] scar management      [x] other: use of home paraffin           Other Objective/Functional Measures:   Able to close hand tightly except index finger     Pain Level (0-10 scale) post treatment: 0/10    ASSESSMENT/Changes in Function: Improving ROM    Patient will continue to benefit from skilled OT services to modify and progress therapeutic interventions, address ROM deficits, analyze and address soft tissue restrictions and instruct in home and community integration to attain remaining goals. []  See Plan of Care  []  See progress note/recertification  []  See Discharge Summary         Progress towards goals / Updated goals:  Patient will be independent in home exercise program for wrist and elbow ROM. Progressing, Pt. Required minimal cueing. 3/1/17  Patient will report pain 0-4/10 at worst with routine tasks. Met 3/6/17  Patient will be able to perform own self care as previously done with L as assist.      Long Term Goals: To be accomplished in 12 treatments:   Patient will improve L elbow extension to at least -10 and L elbow flexion at least 30 degrees for washing hair and mopping. Met for flexion 3/1/17  Patient will report pain 0-2/10 with routine daily tasks  Patient will increase wrist MENDEZ at least 20 degrees for positioning hand for cleaning tasks.    Patient will improve hand use for home care as shown by increase in FOTO of at least 30 points.     PLAN  [x]  Upgrade activities as tolerated     [x]  Continue plan of care  []  Update interventions per flow sheet       []  Discharge due to:_  []  Other:_      Morenita Read, OTR/L 3/6/2017  11:46 AM    Future Appointments  Date Time Provider Emmie Annel   3/8/2017 10:00 AM Morenita Read, OTR/L [facility]   3/10/2017 10:30 AM Stephen Samaniego HFJEKSU [facility]   3/13/2017 10:30 AM Reda Lye AMHPEVK [facility]   3/15/2017 9:00 AM Reda Lye [facility]CPTPB [facility]   3/15/2017 11:00 AM MATTY Sal[address]   3/17/2017 10:30 AM Reda Lye JFJJAKE [facility]   3/20/2017 9:30 AM Morenita Read, OTR/L [facility]CPTP[facility]   3/22/2017 9:30 AM Morenita Read, OTR/L [facility]

## 2017-03-08 ENCOUNTER — HOSPITAL ENCOUNTER (OUTPATIENT)
Dept: PHYSICAL THERAPY | Age: 77
Discharge: HOME | End: 2017-03-08
Payer: COMMERCIAL

## 2017-03-08 PROCEDURE — 97110 THERAPEUTIC EXERCISES: CPT

## 2017-03-08 NOTE — PROGRESS NOTES
OT DAILY TREATMENT NOTE  3    Patient Name: Amita Bell  Date:3/8/2017  : 1940  [x]  Patient  Verified  Payor: [address] [place] [address] Road / Plan: Nik Sin / Product Type:  Workers Comp /    In [email or web address] time:1035  Total Treatment Time (min): 30  Visit #: 5 of 12    Treatment Area: Nondisplaced fracture of head of left radius, initial encounter for closed fracture [S52.035A]    SUBJECTIVE  Pain Level (0-10 scale): 0/10  Any medication changes, allergies to medications, adverse drug reactions, diagnosis change, or new procedure performed?: [x] No    [] Yes (see summary sheet for update)  Subjective functional status/changes:   [] No changes reported  It was aching like a /10 yesterday at the [facility], could have been because I was resting on it or the weather    OBJECTIVE       25 min Therapeutic Exercise:  [] See flow sheet :   Rationale: increase ROM and increase strength to improve the patients ability to grasp  Recheck ROM, strength  Yellow therabar x 10 sup pro and flex ext, dart thrower's motion  Gripper 55# x 50 1 in pegs  UBE 5 mins level 1    5 min Therapeutic Activity:  []  See flow sheet :   Rationale: increase ROM and improve coordination  to improve the patients ability to grasp  1 in pegs sets of 3 x 50       With   [] TE   [] TA   [] neuro   [] other: Patient Education: [x] Review HEP    [] Progressed/Changed HEP based on:   [] positioning   [] body mechanics   [] transfers   [] heat/ice application   [] Splint wear/care   [] Sensory re-education   [] scar management      [x] other: progress made            Other Objective/Functional Measures:   Elbow flex 147  Ext  -7  Wrist flex 60 (50)  Ext  65 (33)      Hand Strength: Gross Grasp 3pt Pinch Lateral Pinch Tip Pinch   Right        Left 32 10 10 7         Pain Level (0-10 scale) post treatment: 0/10    ASSESSMENT/Changes in Function: Improved ROM and strength    Patient will continue to benefit from skilled OT services to modify and progress therapeutic interventions, address ROM deficits, address strength deficits and instruct in home and community integration to attain remaining goals. []  See Plan of Care  []  See progress note/recertification  []  See Discharge Summary         Progress towards goals / Updated goals:  Patient will be independent in home exercise program for wrist and elbow ROM. Met 3/8/17  Patient will report pain 0-4/10 at worst with routine tasks. Met 3/6/17  Patient will be able to perform own self care as previously done with L as assist.progressing 3/8/17      Long Term Goals: To be accomplished in 12 treatments:   Patient will improve L elbow extension to at least -10 and L elbow flexion at least 30 degrees for washing hair and mopping. Met 3/8/17  Patient will report pain 0-2/10 with routine daily tasksmet 3/8/17  Patient will increase wrist MENDEZ at least 20 degrees for positioning hand for cleaning tasks. Met 3/8/17  Patient will improve hand use for home care as shown by increase in FOTO of at least 30 points.   New Goals:3/8/17  Patient will achieve adequate strength for return to work if cleared by MD  PLAN  [x]  Upgrade activities as tolerated     [x]  Continue plan of care  []  Update interventions per flow sheet       []  Discharge due to:_  []  Other:_      NAZ Mckeon/L 3/8/2017  10:09 AM    Future Appointments  Date Time Provider Emmie Up   3/10/2017 10:30 AM Luis Miguel Hannon [facility]   3/13/2017 10:30 AM Luis Miguel Mariner MMCPTPB [facility]   3/15/2017 9:00 AM Luis Miguel Mariner [facility]   3/15/2017 11:00 AM MATTY Richardson Aamir [address]   3/17/2017 10:30 AM Luis Miguel Mariner [facility]CP[facility]   3/20/2017 9:30 AM Suzanne Crane OTR/L [facility]   3/22/2017 9:30 AM Suzanne Crane OTR/L [facility]CP[facility]

## 2017-03-10 ENCOUNTER — HOSPITAL ENCOUNTER (OUTPATIENT)
Dept: PHYSICAL THERAPY | Age: 77
Discharge: HOME | End: 2017-03-10
Payer: COMMERCIAL

## 2017-03-10 PROCEDURE — 97110 THERAPEUTIC EXERCISES: CPT

## 2017-03-10 PROCEDURE — 97018 PARAFFIN BATH THERAPY: CPT

## 2017-03-10 NOTE — PROGRESS NOTES
OT DAILY TREATMENT NOTE  3-    Patient Name: Danya Nye  Date:3/10/2017  : 1940  [x]  Patient  Verified  Payor: [address] [place] [address] Road / Plan: Marcos Jeffries / Product Type:  Workers Comp /    In time:10:30  Out time:11:00  Total Treatment Time (min): 30  Visit #: 6 of 12    Treatment Area: Nondisplaced fracture of head of left radius, initial encounter for closed fracture [S52.613P]    SUBJECTIVE  Pain Level (0-10 scale): 0/10  Any medication changes, allergies to medications, adverse drug reactions, diagnosis change, or new procedure performed?: [x] No    [] Yes (see summary sheet for update)  Subjective functional status/changes:   [] No changes reported      OBJECTIVE    Modality rationale: decrease pain to improve the patients ability to grasp    Min Type Additional Details    [] Estim:  []Unatt       []IFC  []Premod                        []Other:  []w/ice   []w/heat  Position:  Location:    [] Estim: []Att    []TENS instruct  []NMES                    []Other:  []w/US   []w/ice   []w/heat  Position:  Location:    []  Traction: [] Cervical       []Lumbar                       [] Prone          []Supine                       []Intermittent   []Continuous Lbs:  [] before manual  [] after manual    []  Ultrasound: []Continuous   [] Pulsed                           []1MHz   []3MHz W/cm2:  Location:    []  Iontophoresis with dexamethasone         Location: [] Take home patch   [] In clinic    []  Ice     []  heat  []  Ice massage  []  Laser   []  Anodyne Position:  Location:   10 []  Laser with stim  [x]  Other: paraffin  Position: l hand   Location:    []  Vasopneumatic Device Pressure:       [] lo [] med [] hi   Temperature: [] lo [] med [] hi   [] Skin assessment post-treatment:  []intact []redness- no adverse reaction    []redness  adverse reaction:     20 min Therapeutic Exercise:  [] See flow sheet :   Rationale: increase strength to improve the patients ability to grasp   Gripper 1' #55   red thera bar With   [] TE   [] TA   [] neuro   [] other: Patient Education: [x] Review HEP    [] Progressed/Changed HEP based on:   [] positioning   [] body mechanics   [] transfers   [] heat/ice application   [] Splint wear/care   [] Sensory re-education   [] scar management      [] other:             Other Objective/Functional Measures: tolerated red thera-bar vs. yellow     Pain Level (0-10 scale) post treatment: 0/10    ASSESSMENT/Changes in Function: increase strength     Patient will continue to benefit from skilled OT services to modify and progress therapeutic interventions, address ROM deficits, address strength deficits and instruct in home and community integration to attain remaining goals. [x]  See Plan of Care  []  See progress note/recertification  []  See Discharge Summary         Progress towards goals / Updated goals:  Patient will be independent in home exercise program for wrist and elbow ROM. Met 3/8/17  Patient will report pain 0-4/10 at worst with routine tasks. Met 3/6/17  Patient will be able to perform own self care as previously done with L as assist.progressing 3/8/17      Long Term Goals: To be accomplished in 12 treatments:   Patient will improve L elbow extension to at least -10 and L elbow flexion at least 30 degrees for washing hair and mopping. Met 3/8/17  Patient will report pain 0-2/10 with routine daily tasksmet 3/8/17  Patient will increase wrist MENDEZ at least 20 degrees for positioning hand for cleaning tasks. Met 3/8/17  Patient will improve hand use for home care as shown by increase in FOTO of at least 30 points.     PLAN  [x]  Upgrade activities as tolerated     [x]  Continue plan of care  []  Update interventions per flow sheet       []  Discharge due to:_  []  Other:_      Jayna Dural 3/10/2017  10:37 AM    Future Appointments  Date Time Provider Emmie Up   3/13/2017 10:30 AM Jayna Dural [facility]   3/15/2017 9:00 AM Suleiman Hernández [facility] 3/15/2017 11:00 AM MATTY Cook Aamir [address]   3/17/2017 10:30 AM Karin Batista XICIMRK [facility]   3/20/2017 9:30 AM Cristopher Katz, OTR/L [facility]CP[facility]   3/22/2017 9:30 AM Cristopher Katz, OTR/L [facility]

## 2017-03-13 ENCOUNTER — HOSPITAL ENCOUNTER (OUTPATIENT)
Dept: PHYSICAL THERAPY | Age: 77
Discharge: HOME | End: 2017-03-13
Payer: COMMERCIAL

## 2017-03-13 PROCEDURE — 97110 THERAPEUTIC EXERCISES: CPT

## 2017-03-13 NOTE — PROGRESS NOTES
OT DAILY TREATMENT NOTE  3-    Patient Name: Tangela Lewis  Date:3/13/2017  : 1940  [x]  Patient  Verified  Payor: [address] [place] [address] Road / Plan: Sarah Sacks / Product Type: Workers Comp /    In time:10:30  Out time:11:10  Total Treatment Time (min): 30  Visit #: 7 of 12    Treatment Area: Nondisplaced fracture of head of left radius, initial encounter for closed fracture [S52.125A]    SUBJECTIVE  Pain Level (0-10 scale): 0/10  Any medication changes, allergies to medications, adverse drug reactions, diagnosis change, or new procedure performed?: [x] No    [] Yes (see summary sheet for update)  Subjective functional status/changes:   [] No changes reported  I like the arm exercises. I want to do these at home. OBJECTIVE        30 min Therapeutic Exercise:  [] See flow sheet :   Rationale: increase ROM and increase strength to improve the patients ability to   UE arm bike 5 mins   Green thera-bar all planes x10 2 sets   UE exercise with 3# weigth: biceps , should flexion , chest press, over head press. x10 2 sets no pain       With   [] TE   [] TA   [] neuro   [] other: Patient Education: [x] Review HEP    [] Progressed/Changed HEP based on:   [] positioning   [] body mechanics   [] transfers   [] heat/ice application   [] Splint wear/care   [] Sensory re-education   [] scar management      [] other:             Other Objective/Functional Measures: able to tolerate UE exercise with 3# weight without pain  , FOTO: 71    Pain Level (0-10 scale) post treatment: 0/10    ASSESSMENT/Changes in Function: increase strength     Patient will continue to benefit from skilled OT services to modify and progress therapeutic interventions, address ROM deficits, address strength deficits and instruct in home and community integration to attain remaining goals.      [x]  See Plan of Care  []  See progress note/recertification  []  See Discharge Summary         Progress towards goals / Updated goals:  Patient will be independent in home exercise program for wrist and elbow ROM. Met 3/8/17  Patient will report pain 0-4/10 at worst with routine tasks. Met 3/6/17  Patient will be able to perform own self care as previously done with L as assist.progressing 3/8/17      Long Term Goals: To be accomplished in 12 treatments:   Patient will improve L elbow extension to at least -10 and L elbow flexion at least 30 degrees for washing hair and mopping. Met 3/8/17  Patient will report pain 0-2/10 with routine daily tasksmet 3/8/17  Patient will increase wrist MENDEZ at least 20 degrees for positioning hand for cleaning tasks. Met 3/8/17  Patient will improve hand use for home care as shown by increase in FOTO of at least 30 points.  Met 71   3/13/17       PLAN  [x]  Upgrade activities as tolerated     [x]  Continue plan of care  []  Update interventions per flow sheet       []  Discharge due to:_  []  Other:_      Lilia Orr 3/13/2017  10:43 AM    Future Appointments  Date Time Provider Emmie Up   3/15/2017 9:00 AM Lilia Orr [id number] [facility]   3/15/2017 11:00 AM MATTY Shirley Aamir [address]   3/17/2017 10:30 AM Lilia Orr DDFVJAU [facility]   3/20/2017 9:30 AM Juan Jose Echols, OTR/L MMCPTPB [facility]   3/22/2017 9:30 AM Juan Jose Echols, OTR/L [facility]

## 2017-03-15 ENCOUNTER — OFFICE VISIT (OUTPATIENT)
Dept: ORTHOPEDIC SURGERY | Age: 77
End: 2017-03-15

## 2017-03-15 ENCOUNTER — HOSPITAL ENCOUNTER (OUTPATIENT)
Dept: PHYSICAL THERAPY | Age: 77
Discharge: HOME | End: 2017-03-15
Payer: COMMERCIAL

## 2017-03-15 VITALS
SYSTOLIC BLOOD PRESSURE: 166 MMHG | HEIGHT: 67 IN | DIASTOLIC BLOOD PRESSURE: 80 MMHG | TEMPERATURE: 96.1 F | WEIGHT: 152 LBS | HEART RATE: 81 BPM | BODY MASS INDEX: 23.86 KG/M2

## 2017-03-15 DIAGNOSIS — S52.125D CLOSED NONDISPLACED FRACTURE OF HEAD OF LEFT RADIUS WITH ROUTINE HEALING, SUBSEQUENT ENCOUNTER: Primary | ICD-10-CM

## 2017-03-15 PROCEDURE — 97530 THERAPEUTIC ACTIVITIES: CPT

## 2017-03-15 PROCEDURE — 97018 PARAFFIN BATH THERAPY: CPT

## 2017-03-15 PROCEDURE — 97110 THERAPEUTIC EXERCISES: CPT

## 2017-03-15 RX ORDER — IBUPROFEN 800 MG/1
TABLET ORAL
COMMUNITY
Start: 2017-01-31 | End: 2019-04-05

## 2017-03-15 NOTE — LETTER
NOTIFICATION RETURN TO WORK / SCHOOL 
 
3/15/2017 11:18 AM 
 
Mr. Elise Babb [address] [place] [id number] To Whom It May Concern: 
 
Elise Babb is currently under the care of [facility]. He will remain on no duty status x 2 more weeks until he is re-evaluated by our office. If there are questions or concerns please have the patient contact our office. Sincerely, MATTY Stewart

## 2017-03-15 NOTE — PROGRESS NOTES
Abhijeet Stuart  1940   Chief Complaint   Patient presents with    Elbow Pain     Left    Hand Pain     Left thumb        HISTORY OF PRESENT ILLNESS  Abhijeet Stuart is a 68 y.o. male who presents today for evaluation of left hand pain and swelling and left elbow fracture. He rates his pain 0/10 today. He recalls he fell on 2/15/17 but states he didn't have pain and swelling until 2/18. Has been going to OT. Presents today in his sling. States he is happy with his progress. Has full range of motion but still feels weak. Has been on no duty status. Patient denies any fever, chills, chest pain, shortness of breath or calf pain. There are no new illness or injuries to report since last seen in the office. No changes in medications, allergies, social or family history. PHYSICAL EXAM:   Visit Vitals    /80    Pulse 81    Temp 96.1 °F (35.6 °C) (Oral)    Ht 5' 7\" (1.702 m)    Wt 152 lb (68.9 kg)    BMI 23.81 kg/m2     The patient is a well-developed, well-nourished male   in no acute distress. The patient is alert and oriented times three. The patient is alert and oriented times three. Mood and affect are normal.  LYMPHATIC: lymph nodes are not enlarged and are within normal limits  SKIN: normal in color and non tender to palpation. There are no bruises or abrasions noted. NEUROLOGICAL: Motor sensory exam is within normal limits. Reflexes are equal bilaterally.  There is normal sensation to pinprick and light touch  MUSCULOSKELETAL:  Examination Left Hand   Skin Intact   Deformity -   Swelling -   Tenderness -   Tenderness A1 Pulley -   Finger flexion Able to make full fist   Finger extension full   Thenar Eminence Atrophy -   Sensation Normal   Capillary refill -   Heberden's nodes -   Dupuytren's -     Examination Left Wrist   Skin Intact   Tenderness -   Flexion 30   Extension 30   Deformity -   Effusion -   Tinnel's sign -   Phalen's test -   Finklestein maneuver -   Pain with thumb abduction -       Examination Left Elbow   Skin Intact   Range of Motion Full extension , full flexion   Tenderness Medial Epicondyle -   Tenderness Lateral Epicondyle -   Tenderness Olecranon Bursa -   Swelling -   Bruising -   Stability Normal   Motor Strength  Normal   Neurovascular Intact         IMAGING:   XR of the left elbow was reviewed and read small non displaced radial head lucency line consistent with radial head fracture      IMPRESSION:      ICD-10-CM ICD-9-CM    1. Closed nondisplaced fracture of head of left radius with routine healing, subsequent encounter S52.125D V54.12 AMB POC XRAY, ELBOW; COMPLETE, 3+ VIE      REFERRAL TO OCCUPATIONAL THERAPY        PLAN:   1. Patient continues to improve  2. Will d/c sling  3. Cont with OT  4. Remain on no duty status at work  RTC 2 weeks to see if we can return to work    Follow-up Disposition: Not on [address], [place].    [facility] between [facility]

## 2017-03-15 NOTE — PROGRESS NOTES
OT DAILY TREATMENT NOTE  3-16    Patient Name: Cory Reese  Date:3/15/2017  : 1940  [x]  Patient  Verified  Payor: [address] [place] [address] Road / Plan: Chantelle Spain / Product Type: Workers Comp /    In time:9:00  Out time:9:32  Total Treatment Time (min): 32  Visit #: 8 of 12    Treatment Area: Nondisplaced fracture of head of left radius, initial encounter for closed fracture [S52.125A]    SUBJECTIVE  Pain Level (0-10 scale): 4/10 in thumb no pain in wrist   Any medication changes, allergies to medications, adverse drug reactions, diagnosis change, or new procedure performed?: [x] No    [] Yes (see summary sheet for update)  Subjective functional status/changes:   [] No changes reported  I some time have a very little bit of pain in my wrist at the end of the day . Pt. Stated I have no problems anymore with any of my bathing /dressing/grooming. I am going to the doctor today .      OBJECTIVE    Modality rationale: decrease pain to improve the patients ability to grasp    Min Type Additional Details    [] Estim:  []Unatt       []IFC  []Premod                        []Other:  []w/ice   []w/heat  Position:  Location:    [] Estim: []Att    []TENS instruct  []NMES                    []Other:  []w/US   []w/ice   []w/heat  Position:  Location:    []  Traction: [] Cervical       []Lumbar                       [] Prone          []Supine                       []Intermittent   []Continuous Lbs:  [] before manual  [] after manual    []  Ultrasound: []Continuous   [] Pulsed                           []1MHz   []3MHz W/cm2:  Location:    []  Iontophoresis with dexamethasone         Location: [] Take home patch   [] In clinic    []  Ice     []  heat  []  Ice massage  []  Laser   []  Anodyne Position:  Location:   10 []  Laser with stim  [x]  Other: paraffin  Position:L hand   Location:    []  Vasopneumatic Device Pressure:       [] lo [] med [] hi   Temperature: [] lo [] med [] hi   [x] Skin assessment post-treatment:  [x]intact []redness- no adverse reaction    []redness  adverse reaction:     13 min Therapeutic Exercise:  [] See flow sheet :   Rationale: increase ROM and increase strength to improve the patients ability to grasp use L hand without limitations   Arm bike 5 mins   Wrist mobilizer       9 min Therapeutic Activity:  []  See flow sheet :   Rationale: increase ROM and improve coordination  to improve the patients ability to secure small object in and and manipulate small objects    1/4 pegs with mod difficulty securing objects in hand     With   [] TE   [] TA   [] neuro   [] other: Patient Education: [x] Review HEP    [] Progressed/Changed HEP based on:   [] positioning   [] body mechanics   [] transfers   [] heat/ice application   [] Splint wear/care   [] Sensory re-education   [] scar management      [] other:             Other Objective/Functional Measures: mod difficulty with palm to finger translation , no pain      Pain Level (0-10 scale) post treatment: 0/10     ASSESSMENT/Changes in Function: increase ROM , decrease Pain     Patient will continue to benefit from skilled OT services to modify and progress therapeutic interventions, address ROM deficits, address strength deficits and instruct in home and community integration to attain remaining goals. [x]  See Plan of Care  []  See progress note/recertification  []  See Discharge Summary         Progress towards goals / Updated goals:  Patient will be independent in home exercise program for wrist and elbow ROM. Met 3/8/17  Patient will report pain 0-4/10 at worst with routine tasks. Met 3/6/17  Patient will be able to perform own self care as previously done with L as assist.MET 3/15/17      Long Term Goals: To be accomplished in 12 treatments:   Patient will improve L elbow extension to at least -10 and L elbow flexion at least 30 degrees for washing hair and mopping.  Met 3/8/17  Patient will report pain 0-2/10 with routine daily tasksmet 3/8/17  Patient will increase wrist MENDEZ at least 20 degrees for positioning hand for cleaning tasks. Met 3/8/17  Patient will improve hand use for home care as shown by increase in FOTO of at least 30 points.  Met 71 3/13/17    PLAN  [x]  Upgrade activities as tolerated     [x]  Continue plan of care  []  Update interventions per flow sheet       []  Discharge due to:_  []  Other:_      Maira Sahu 3/15/2017  9:10 AM    Future Appointments  Date Time Provider Emmie Up   3/15/2017 11:00 AM MATTY [address]   3/17/2017 10:30 AM Maira Sahu JJEINWB [facility]   3/20/2017 9:30 AM Morenita Read, OTR/L MMCPTP[facility]   3/22/2017 9:30 AM Morenita Read, OTR/L [facility]

## 2017-03-17 ENCOUNTER — HOSPITAL ENCOUNTER (OUTPATIENT)
Dept: PHYSICAL THERAPY | Age: 77
Discharge: HOME | End: 2017-03-17
Payer: COMMERCIAL

## 2017-03-17 PROCEDURE — 97018 PARAFFIN BATH THERAPY: CPT

## 2017-03-17 PROCEDURE — 97532 HC OT COGNITIVE SKILL DEV 15 MIN: CPT

## 2017-03-17 PROCEDURE — 97110 THERAPEUTIC EXERCISES: CPT

## 2017-03-17 NOTE — PROGRESS NOTES
OT DAILY TREATMENT NOTE  3-16    Patient Name: Abhijeet Bound  Date:3/17/2017  : 1940  [x]  Patient  Verified  Payor: [facility] / Plan: Chacha Bleak / Product Type: Workers Comp /    In time:10:34  Out time:11:01  Total Treatment Time (min): 27  Visit #: 9 of 12    Treatment Area: Nondisplaced fracture of head of left radius, initial encounter for closed fracture [S52.243A]    SUBJECTIVE  Pain Level (0-10 scale): 0/10 just stiffness in my  Thumb   Any medication changes, allergies to medications, adverse drug reactions, diagnosis change, or new procedure performed?: [x] No    [] Yes (see summary sheet for update)  Subjective functional status/changes:   [] No changes reported  I have some stiffness in my thumb. Pt. Gave GLOVER new orders from doctor.      OBJECTIVE    Modality rationale: decrease pain to improve the patients ability to grasp    Min Type Additional Details    [] Estim:  []Unatt       []IFC  []Premod                        []Other:  []w/ice   []w/heat  Position:  Location:    [] Estim: []Att    []TENS instruct  []NMES                    []Other:  []w/US   []w/ice   []w/heat  Position:  Location:    []  Traction: [] Cervical       []Lumbar                       [] Prone          []Supine                       []Intermittent   []Continuous Lbs:  [] before manual  [] after manual    []  Ultrasound: []Continuous   [] Pulsed                           []1MHz   []3MHz W/cm2:  Location:    []  Iontophoresis with dexamethasone         Location: [] Take home patch   [] In clinic    []  Ice     []  heat  []  Ice massage  []  Laser   []  Anodyne Position:  Location:   10 []  Laser with stim  [x]  Other: paraffin  Position:L hand   Location:    []  Vasopneumatic Device Pressure:       [] lo [] med [] hi   Temperature: [] lo [] med [] hi   [x] Skin assessment post-treatment:  [x]intact []redness- no adverse reaction    []redness  adverse reaction:     17 min Therapeutic Exercise:  [] See flow sheet : Rationale: increase strength to improve the patients ability to    #55 gripper with 1' pegs x40   Sting and dowel x 3   Wrist mazes both x5   Chinese ball x30 (2)  Green thera bar     With   [] TE   [] TA   [] neuro   [] other: Patient Education: [x] Review HEP    [] Progressed/Changed HEP based on:   [] positioning   [] body mechanics   [] transfers   [] heat/ice application   [] Splint wear/care   [] Sensory re-education   [] scar management      [] other:             Other Objective/Functional Measures: tolerated increase repetitions of chinese ball      Pain Level (0-10 scale) post treatment: 0/10    ASSESSMENT/Changes in Function: increase strength and ROM     Patient will continue to benefit from skilled OT services to modify and progress therapeutic interventions, address ROM deficits, address strength deficits and instruct in home and community integration to attain remaining goals. []  See Plan of Care  []  See progress note/recertification  []  See Discharge Summary         Progress towards goals / Updated goals:  Patient will be independent in home exercise program for wrist and elbow ROM. Met 3/8/17  Patient will report pain 0-4/10 at worst with routine tasks. Met 3/6/17  Patient will be able to perform own self care as previously done with L as assist.MET 3/15/17      Long Term Goals: To be accomplished in 12 treatments:   Patient will improve L elbow extension to at least -10 and L elbow flexion at least 30 degrees for washing hair and mopping. Met 3/8/17  Patient will report pain 0-2/10 with routine daily tasksmet 3/8/17  Patient will increase wrist MENDEZ at least 20 degrees for positioning hand for cleaning tasks. Met 3/8/17  Patient will improve hand use for home care as shown by increase in FOTO of at least 30 points.  Met 71 3/13/17    PLAN  [x]  Upgrade activities as tolerated     [x]  Continue plan of care  []  Update interventions per flow sheet       []  Discharge due to:_  []  Other:_ Jorge Li 3/17/2017  10:19 AM    Future Appointments  Date Time Provider Emmie Up   3/17/2017 10:30 AM Jorge Li [id number]   3/20/2017 9:30 AM Josh Rodriguez, OTR/L [id number] [facility]   3/22/2017 9:30 AM Josh Rodriguez, OTR/L MMCPTPB [facility]   3/29/2017 3:15 PM MATTY Juarez Aamir 69

## 2017-03-20 ENCOUNTER — HOSPITAL ENCOUNTER (OUTPATIENT)
Dept: PHYSICAL THERAPY | Age: 77
Discharge: HOME | End: 2017-03-20
Payer: COMMERCIAL

## 2017-03-20 PROCEDURE — 97110 THERAPEUTIC EXERCISES: CPT

## 2017-03-20 NOTE — PROGRESS NOTES
OT DAILY TREATMENT NOTE  3    Patient Name: Rex Ford  Date:3/20/2017  : 1940  [x]  Patient  Verified  Payor: [facility] / Plan: [place] / Product Type: Workers Comp /    In time:930  Out time:1000  Total Treatment Time (min): 30  Visit #: 10 of 12    Treatment Area: Nondisplaced fracture of head of left radius, initial encounter for closed fracture [S56.849T]    SUBJECTIVE  Pain Level (0-10 scale): 0/10  Any medication changes, allergies to medications, adverse drug reactions, diagnosis change, or new procedure performed?: [x] No    [] Yes (see summary sheet for update)  Subjective functional status/changes:   [] No changes reported  I can feel it it doesn't hurt, just gets tired    OBJECTIVE       30 min Therapeutic Exercise:  [] See flow sheet :   Rationale: increase strength to improve the patients ability to grasp  Added wrist ex with 1#, 3 way elbow curls with 1#  Green therabar x 15  Gripper 55# x 50    With   [] TE   [] TA   [] neuro   [] other: Patient Education: [x] Review HEP    [] Progressed/Changed HEP based on:   [] positioning   [] body mechanics   [] transfers   [] heat/ice application   [] Splint wear/care   [] Sensory re-education   [] scar management      [] other:             Other Objective/Functional Measures:   Completed 50 1 in pegs with 55# gripper     Pain Level (0-10 scale) post treatment: 0/10    ASSESSMENT/Changes in Function: Improving strength    Patient will continue to benefit from skilled OT services to modify and progress therapeutic interventions, address ROM deficits, address strength deficits and instruct in home and community integration to attain remaining goals. []  See Plan of Care  []  See progress note/recertification  []  See Discharge Summary         Progress towards goals / Updated goals:  Patient will be independent in home exercise program for wrist and elbow ROM. Met 3/8/17  Patient will report pain 0-4/10 at worst with routine tasks.  Met 3/6/17  Patient will be able to perform own self care as previously done with L as assist.MET 3/15/17      Long Term Goals: To be accomplished in 12 treatments:   Patient will improve L elbow extension to at least -10 and L elbow flexion at least 30 degrees for washing hair and mopping. Met 3/8/17  Patient will report pain 0-2/10 with routine daily tasksmet 3/8/17  Patient will increase wrist MENDEZ at least 20 degrees for positioning hand for cleaning tasks. Met 3/8/17  Patient will improve hand use for home care as shown by increase in FOTO of at least 30 points.  Met 71 3/13/17      PLAN  []  Upgrade activities as tolerated     []  Continue plan of care  []  Update interventions per flow sheet       []  Discharge due to:_  []  Other:_      TERE Fuentes 3/20/2017  9:53 AM    Future Appointments  Date Time Provider Emmie Up   3/22/2017 9:30 AM NAZ Fuentes/MANOJ MMCPTP[facility]   3/29/2017 3:15 PM Garret Novoa, [address] [facility]

## 2017-03-22 ENCOUNTER — HOSPITAL ENCOUNTER (OUTPATIENT)
Dept: PHYSICAL THERAPY | Age: 77
Discharge: HOME | End: 2017-03-22
Payer: COMMERCIAL

## 2017-03-22 PROCEDURE — 97110 THERAPEUTIC EXERCISES: CPT

## 2017-03-22 NOTE — PROGRESS NOTES
[facility] Billy Leung  [address] [facility]  [phone] fax    Occupational Therapy Progress Note  Patient name: Rex Garland Start of Care: 17   Referral source: Keri Bassett,* : 1940   Medical/Treatment Diagnosis: Nondisplaced fracture of head of left radius, initial encounter for closed fracture [S52.125A] Onset Date:2/15/17     Prior Hospitalization: see medical history Provider#: [id number]   Medications: Verified on Patient Summary List    Comorbidities: Arthritis, L thumb fracture, HTN  Prior Level of Function: TV, music, works at [employer], I self care, home care, driving, reading  Visits from [facility]: 11    Missed Visits: 0    Established Goals:         Excellent           Good         Limited           None  [x] Increased ROM   [x]  []  []  []  [x] Increased Strength  []  [x]  []  []  [] Increased Mobility  []  []  []  []   [x] Decreased Pain   [x]  []  []  []  [] Decreased Swelling  []  []  []  []  [] Increased Fine Motor Skills []  []  []  []  [x] Increased ADL [place] [x]  []  []  []    Key Functional Changes:   Pain decreased from 6-7/10 to 0/10. FOTO increased from 16 to 71. Initial  3/8/17  3/22/17  Elbow flex   105  147  155  Ext    -30  -7  -2  Wrist flex   (50)  60  NT  Ext    (33)  65  NT    Strength measures from 3/8/17 in ( )  Hand Strength: Gross Grasp 3pt Pinch Lateral Pinch Tip Pinch   Right        Left 51 (32) 10 (10) 15 (10) 9 (7)     Prior goals and progress:  Patient will be independent in home exercise program for wrist and elbow ROM. MET  Patient will report pain 0-4/10 at worst with routine tasks. Met   Patient will be able to perform own self care as previously done with L as assist.MET       Long Term Goals:  To be accomplished in 12 treatments:   Patient will improve L elbow extension to at least -10 and L elbow flexion at least 30 degrees for washing hair and mopping. Met   Patient will report pain 0-2/10 with routine daily tasksmet   Patient will increase wrist MENDEZ at least 20 degrees for positioning hand for cleaning tasks. Met   Patient will improve hand use for home care as shown by increase in FOTO of at least 30 points. Met      Updated Goals: to be achieved in 4 weeks:  1. Patient will improve L hand  10# for grasp of objects at work. 2.  Patient will improve L elbow flexion strength to allow for lifting items at work  3. Patient will report no discomfort with LUE use during simulated floor care tasks. ASSESSMENT/RECOMMENDATIONS:  [x]Continue therapy per initial plan/protocol at a frequency of  3 x per week for 4 weeks  []Continue therapy with the following recommended changes:_____________________      _____________________________________________________________________  []Discontinue therapy progressing towards or have reached established goals  []Discontinue therapy due to lack of appreciable progress towards goals  []Discontinue therapy due to lack of attendance or compliance  []Await Physician's recommendations/decisions regarding therapy  []Other:________________________________________________________________    Thank you for this referral.   TERE Salazar 3/22/2017 9:44 AM  NOTE TO PHYSICIAN:  PLEASE COMPLETE THE ORDERS BELOW AND   FAX TO [facility]: [phone]  If you are unable to process this request in 24 hours please contact our office: [phone] I have read the above report and request that my patient continue as recommended. ? I have read the above report and request that my patient continue therapy with the following changes/special instructions:________________________________________________________  ? I have read the above report and request that my patient be discharged from therapy.     Physicians signature: ________________________________Date: _____Time:_____

## 2017-03-22 NOTE — PROGRESS NOTES
OT DAILY TREATMENT NOTE  3    Patient Name: Cory Reese  Date:3/22/2017  : 1940  [x]  Patient  Verified  Payor: [address] [place] [address] Road / Plan: Chantelle Spain / Product Type: Workers Comp /    In time:930  Out time:1000  Total Treatment Time (min): 30  Visit #: 11 of 12    Treatment Area: Nondisplaced fracture of head of left radius, initial encounter for closed fracture [S52.618A]    SUBJECTIVE  Pain Level (0-10 scale): 0/10  Any medication changes, allergies to medications, adverse drug reactions, diagnosis change, or new procedure performed?: [x] No    [] Yes (see summary sheet for update)  Subjective functional status/changes:   [] No changes reported  Feel it alittle in the joint when I bend or straighten all the way    OBJECTIVE      30 min Therapeutic Exercise:  [] See flow sheet :   Rationale: increase ROM and increase strength to improve the patients ability to grasp  Added blue web punch and bow pull x 15  Recheck for insurance auth         With   [] TE   [] TA   [] neuro   [] other: Patient Education: [x] Review HEP    [] Progressed/Changed HEP based on:   [] positioning   [] body mechanics   [] transfers   [] heat/ice application   [] Splint wear/care   [] Sensory re-education   [] scar management      [] other: progress made            Other Objective/Functional Measures:     3/8/17  Initial  3/22/17  Elbow flex 147  105  155  Ext  -7  -30  -2  Wrist flex 60  (50)  NT  Ext  65  (33)  NT    Strength measures from 3/8/17 in ( )  Hand Strength: Gross Grasp 3pt Pinch Lateral Pinch Tip Pinch   Right        Left 51 (32) 10 (10) 15 (10) 9 (7)          Pain Level (0-10 scale) post treatment: 0/10    ASSESSMENT/Changes in Function: Improved ROM, strength    Patient will continue to benefit from skilled OT services to address strength deficits and instruct in home and community integration to attain remaining goals.      []  See Plan of Care  [x]  See progress note/recertification  []  See Discharge Summary Progress towards goals / Updated goals:  Patient will be independent in home exercise program for wrist and elbow ROM. MET  Patient will report pain 0-4/10 at worst with routine tasks. Met   Patient will be able to perform own self care as previously done with L as assist.MET       Long Term Goals: To be accomplished in 12 treatments:   Patient will improve L elbow extension to at least -10 and L elbow flexion at least 30 degrees for washing hair and mopping. Met   Patient will report pain 0-2/10 with routine daily tasksmet   Patient will increase wrist MENDEZ at least 20 degrees for positioning hand for cleaning tasks. Met   Patient will improve hand use for home care as shown by increase in FOTO of at least 30 points.  Met     PLAN  []  Upgrade activities as tolerated     []  Continue plan of care  []  Update interventions per flow sheet       []  Discharge due to:_  []  Other:_      Joshua Quivers, OTR/L 3/22/2017  9:32 AM    Future Appointments  Date Time Provider Emmie Up   3/24/2017 9:30 AM Joshua Quivers, OTR/L MMCPTPB [facility]   3/28/2017 1:30 PM Luis Reneune [facility]   3/29/2017 3:15 PM MATTY Adame   3/30/2017 10:30 AM Joshua Quivers, OTR/L PLETSMT [facility]   4/3/2017 2:00 PM Joshua Quivers, OTR/L MMCPTPB [facility]   4/5/2017 9:00 AM Joshua Quivers, OTR/L ZUGEBCB [facility]   4/7/2017 10:00 AM Joshua Quivers, OTR/L MMCPTPB [facility]   4/10/2017 9:30 AM Joshua Quivers, OTR/L MMCPTP[facility]   4/12/2017 11:00 AM Joshua Quivers, OTR/L MMCPTPB [facility]   4/14/2017 10:30 AM Joenathan Fortune [facility]   4/17/2017 1:30 PM Joshua Silva, OTR/L MMCPTP[facility]

## 2017-03-24 ENCOUNTER — HOSPITAL ENCOUNTER (OUTPATIENT)
Dept: PHYSICAL THERAPY | Age: 77
Discharge: HOME | End: 2017-03-24
Payer: COMMERCIAL

## 2017-03-24 PROCEDURE — 97110 THERAPEUTIC EXERCISES: CPT

## 2017-03-24 PROCEDURE — 97018 PARAFFIN BATH THERAPY: CPT

## 2017-03-24 NOTE — PROGRESS NOTES
OT DAILY TREATMENT NOTE  3-16    Patient Name: Debo Arboleda  Date:3/24/2017  : 1940  [x]  Patient  Verified  Payor: [address] [place] [address] Road / Plan: [facility] / Product Type:  Workers Comp /    In [employer]  Out time:1010  Total Treatment Time (min): 35  Visit #: 12 of 12    Treatment Area: Nondisplaced fracture of head of left radius, initial encounter for closed fracture [S52.469Z]    SUBJECTIVE  Pain Level (0-10 scale): 0/10  Any medication changes, allergies to medications, adverse drug reactions, diagnosis change, or new procedure performed?: [x] No    [] Yes (see summary sheet for update)  Subjective functional status/changes:   [] No changes reported  My thumb hurts a little after all that    OBJECTIVE    Modality rationale: decrease pain to improve the patients ability to grasp   Min Type Additional Details    [] Estim:  []Unatt       []IFC  []Premod                        []Other:  []w/ice   []w/heat  Position:  Location:    [] Estim: []Att    []TENS instruct  []NMES                    []Other:  []w/US   []w/ice   []w/heat  Position:  Location:    []  Traction: [] Cervical       []Lumbar                       [] Prone          []Supine                       []Intermittent   []Continuous Lbs:  [] before manual  [] after manual    []  Ultrasound: []Continuous   [] Pulsed                           []1MHz   []3MHz W/cm2:  Location:    []  Iontophoresis with dexamethasone         Location: [] Take home patch   [] In clinic    []  Ice     []  heat  []  Ice massage  []  Laser   []  Anodyne Position:  Location:     10 []  Laser with stim  [x]  Other:Paraffin  Position:  Location:L hand    []  Vasopneumatic Device Pressure:       [] lo [] med [] hi   Temperature: [] lo [] med [] hi   [x] Skin assessment post-treatment:  [x]intact []redness- no adverse reaction    []redness  adverse reaction:     25 min Therapeutic Exercise:  [] See flow sheet :   Rationale: increase ROM and increase strength to improve the patients ability to grasp, lift  Increased reps to 20 on all exercises        With   [] TE   [] TA   [] neuro   [] other: Patient Education: [x] Review HEP    [] Progressed/Changed HEP based on:   [] positioning   [] body mechanics   [] transfers   [] heat/ice application   [] Splint wear/care   [] Sensory re-education   [] scar management      [] other:             Other Objective/Functional Measures: Tolerated increased reps with thumb pain reported     Pain Level (0-10 scale) post treatment: 0/10    ASSESSMENT/Changes in Function: Increasing strength    Patient will continue to benefit from skilled OT services to modify and progress therapeutic interventions, address strength deficits and instruct in home and community integration to attain remaining goals. []  See Plan of Care  []  See progress note/recertification  []  See Discharge Summary         Progress towards goals / Updated goals:  Patient will improve L hand  10# for grasp of objects at work. Patient will improve L elbow flexion strength to allow for lifting items at work  Patient will report no discomfort with LUE use during simulated floor care tasks.           PLAN  [x]  Upgrade activities as tolerated     [x]  Continue plan of care  []  Update interventions per flow sheet       []  Discharge due to:_  []  Other:_      Vlad Pena, OTR/L 3/24/2017  12:40 PM    Future Appointments  Date Time Provider Emmie Up   3/28/2017 1:30 PM Salvador Cifuentes [facility]   3/29/2017 3:15 PM MATTY Clarke 69   3/30/2017 10:30 AM Vlad Pena, OTR/L MMCPTPB [facility]   4/3/2017 2:00 PM Vlad Pena, OTR/L MMCPTP[facility]   4/5/2017 9:00 AM Vlad Pena, OTR/L MMCPTPB [facility]   4/7/2017 10:00 AM Vlad Pena, OTR/L MMCPTPB [facility]   4/10/2017 9:30 AM Vlad Pena, OTR/L MMCPTPB [facility]   4/12/2017 11:00 AM Vlad Pena, OTR/L MMCPTPB [facility]   4/14/2017 10:30 AM Salvador Cifuentes MMCPTP[facility]   4/17/2017 1:30 PM Vlad Pena, OTR/L MMCPTPB [facility]

## 2017-03-28 ENCOUNTER — TELEPHONE (OUTPATIENT)
Dept: ORTHOPEDIC SURGERY | Age: 77
End: 2017-03-28

## 2017-03-28 ENCOUNTER — HOSPITAL ENCOUNTER (OUTPATIENT)
Dept: PHYSICAL THERAPY | Age: 77
Discharge: HOME | End: 2017-03-28
Payer: COMMERCIAL

## 2017-03-28 DIAGNOSIS — S52.125D CLOSED NONDISPLACED FRACTURE OF HEAD OF LEFT RADIUS WITH ROUTINE HEALING: Primary | ICD-10-CM

## 2017-03-28 NOTE — TELEPHONE ENCOUNTER
Patient states he was not able to do his physical therapy today because [facility] states they do not have paperwork for him to continue therapy. They stated his workman's comp would not cover the appt. Patient is scheduled for another appt on Thursday. Please advise.

## 2017-03-29 NOTE — TELEPHONE ENCOUNTER
Patients wife called me back and stated that he attends therapy at [facility]. Order was faxed to them.

## 2017-03-30 ENCOUNTER — HOSPITAL ENCOUNTER (OUTPATIENT)
Dept: PHYSICAL THERAPY | Age: 77
Discharge: HOME | End: 2017-03-30
Payer: COMMERCIAL

## 2017-03-30 PROCEDURE — 97110 THERAPEUTIC EXERCISES: CPT

## 2017-04-03 ENCOUNTER — HOSPITAL ENCOUNTER (OUTPATIENT)
Dept: PHYSICAL THERAPY | Age: 77
Discharge: HOME | End: 2017-04-03
Payer: COMMERCIAL

## 2017-04-03 ENCOUNTER — OFFICE VISIT (OUTPATIENT)
Dept: ORTHOPEDIC SURGERY | Age: 77
End: 2017-04-03

## 2017-04-03 VITALS
DIASTOLIC BLOOD PRESSURE: 77 MMHG | SYSTOLIC BLOOD PRESSURE: 151 MMHG | HEART RATE: 70 BPM | BODY MASS INDEX: 23.34 KG/M2 | TEMPERATURE: 98 F | WEIGHT: 149 LBS

## 2017-04-03 DIAGNOSIS — S52.125D CLOSED NONDISPLACED FRACTURE OF HEAD OF LEFT RADIUS WITH ROUTINE HEALING: Primary | ICD-10-CM

## 2017-04-03 PROCEDURE — 97110 THERAPEUTIC EXERCISES: CPT

## 2017-04-03 NOTE — PROGRESS NOTES
OT DAILY TREATMENT NOTE  3-    Patient Name: Jenise Perez  Date:4/3/2017  : 1940  [x]  Patient  Verified  Payor: [address] / Plan: [employer] / Product Type: Workers Comp /    In [facility] Financial time:230  Total Treatment Time (min): 30  Visit #: 2 of     Treatment Area: Nondisplaced fracture of head of left radius, initial encounter for closed fracture [S52.751A]    SUBJECTIVE  Pain Level (0-10 scale): 0/10  Any medication changes, allergies to medications, adverse drug reactions, diagnosis change, or new procedure performed?: [x] No    [] Yes (see summary sheet for update)  Subjective functional status/changes:   [] No changes reported  It is clicking (elbow) when I do this (triceps exercise)-stops now that I do it slow    OBJECTIVE      30 min Therapeutic Exercise:  [x] See flow sheet :   Rationale: increase strength to improve the patients ability to lift  Increased reps on elbow and wrist ex to 20  Recheck of  and pinches-pinches with pain when thumb in opposition         With   [] TE   [] TA   [] neuro   [] other: Patient Education: [x] Review HEP    [] Progressed/Changed HEP based on:   [] positioning   [] body mechanics   [] transfers   [] heat/ice application   [] Splint wear/care   [] Sensory re-education   [] scar management      [x] other: progress made, slow movement to prevent snapping            Other Objective/Functional Measures:     Hand Strength: Gross Grasp 3pt Pinch Lateral Pinch Tip Pinch   Right        Left 58 (51) 11 thumb p!(10) 15 (15) 8 thumb p! (9)         Pain Level (0-10 scale) post treatment: 0/10    ASSESSMENT/Changes in Function: improving strength, cracking with rapid elbow extension resolved when motion slowed    Patient will continue to benefit from skilled OT services to modify and progress therapeutic interventions, address strength deficits and instruct in home and community integration to attain remaining goals.      []  See Plan of Care  []  See progress note/recertification  []  See Discharge Summary         Progress towards goals / Updated goals:  1. Patient will improve L hand  10# for grasp of objects at work. -increased 7# 4/3/17  2. Patient will improve L elbow flexion strength to allow for lifting items at work-increased to 2# for exercises 3/30/17  3. Patient will report no discomfort with LUE use during simulated floor care tasks.           PLAN  []  Upgrade activities as tolerated     []  Continue plan of care  []  Update interventions per flow sheet       []  Discharge due to:_  []  Other:_      Valerie Pyle, OTR/L 4/3/2017  2:03 PM    Future Appointments  Date Time Provider Emmie Up   4/3/2017 3:15 PM MATTY Harvey   4/5/2017 9:00 AM Valerie Pyle OTR/L MMCP[facility]   4/7/2017 10:00 AM Valerie Pyle OTR/L MMCPTP[facility]   4/10/2017 9:30 AM Valerie Pyle OTR/L MMCPTP[facility]   4/12/2017 11:00 AM Valerie Pyle OTR/L MMCPTPB [facility]   4/14/2017 10:30 AM Brandi Douglass [facility]   4/17/2017 1:30 PM Valerie Pyle OTR/L MMCPTPB [facility]

## 2017-04-03 NOTE — LETTER
NOTIFICATION RETURN TO WORK / SCHOOL 
 
4/3/2017 3:26 PM 
 
Mr. Ingrid Uriostegui [address] [facility] [address] To Whom It May Concern: 
 
Ingrid Uriostegui is currently under the care of [address] [place] [address] [place]. He will return to work on Monday 4-10-17. If there are questions or concerns please have the patient contact our office. Sincerely, MATTY Leon

## 2017-04-03 NOTE — PROGRESS NOTES
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 Progress towards Goals:   Current  and pinches below with 3/22/17 results in ( )    Hand Strength: Gross Grasp 3pt Pinch Lateral Pinch Tip Pinch   Right        Left 58 (51) 11 thumb p!(10) 15 (15) 8 thumb p! (9)      Patient can now complete exercises with 2# without pain. Goals: to be achieved in 3  weeks:  1. Patient will improve L hand  10# for grasp of objects at work.-progressing increased 7#  2. Patient will improve L elbow flexion strength to allow for lifting items at work-increased to 2# for exercises   3. Patient will report no discomfort with LUE use during simulated floor care tasks.         ASSESSMENT/RECOMMENDATIONS:  [x]Continue therapy per initial plan/protocol at a frequency of  2 x per week for 3 weeks  []Continue therapy with the following recommended changes:_____________________      _____________________________________________________________________  []Discontinue therapy progressing towards or have reached established goals  []Discontinue therapy due to lack of appreciable progress towards goals  []Discontinue therapy due to lack of attendance or compliance  []Await Physician's recommendations/decisions regarding therapy  []Other:________________________________________________________________    Thank you for this referral.   TERE Goodman 4/3/2017 2:31 PM  NOTE TO PHYSICIAN:  PLEASE COMPLETE THE ORDERS BELOW AND   FAX TO [facility] [place] [facility]: [phone]  If you are unable to process this request in 24 hours please contact our office: [phone]    ? I have read the above report and request that my patient continue as recommended. ? I have read the above report and request that my patient continue therapy with the following changes/special instructions:___________________________________________________________  ? I have read the above report and request that my patient be discharged from therapy.     Physicians signature: ________________________________Date: _____Time:_____

## 2017-04-03 NOTE — PROGRESS NOTES
Freda Duong  1940   Chief Complaint   Patient presents with    Wrist Pain     Left        HISTORY OF PRESENT ILLNESS  Freda Duong is a 68 y.o. male who presents today for evaluation of left hand pain and swelling and left elbow fracture. He rates his pain 0/10 today. He recalls he fell on 2/15/17 but states he didn't have pain and swelling until 2/18. Has been going to OT. States he is happy with his progress. Has full range of motion and is getting stronger. Has been on no duty status. Patient denies any fever, chills, chest pain, shortness of breath or calf pain. There are no new illness or injuries to report since last seen in the office. No changes in medications, allergies, social or family history. PHYSICAL EXAM:   Visit Vitals    /77 (BP 1 Location: Left arm, BP Patient Position: Sitting)    Pulse 70    Temp 98 °F (36.7 °C) (Oral)    Wt 149 lb (67.6 kg)    BMI 23.34 kg/m2     The patient is a well-developed, well-nourished male   in no acute distress. The patient is alert and oriented times three. The patient is alert and oriented times three. Mood and affect are normal.  LYMPHATIC: lymph nodes are not enlarged and are within normal limits  SKIN: normal in color and non tender to palpation. There are no bruises or abrasions noted. NEUROLOGICAL: Motor sensory exam is within normal limits. Reflexes are equal bilaterally.  There is normal sensation to pinprick and light touch  MUSCULOSKELETAL:  Examination Left Hand   Skin Intact   Deformity -   Swelling -   Tenderness -   Tenderness A1 Pulley -   Finger flexion Able to make full fist   Finger extension full   Thenar Eminence Atrophy -   Sensation Normal   Capillary refill -   Heberden's nodes -   Dupuytren's -     Examination Left Wrist   Skin Intact   Tenderness -   Flexion 30   Extension 30   Deformity -   Effusion -   Tinnel's sign -   Phalen's test -   Finklestein maneuver -   Pain with thumb abduction - Examination Left Elbow   Skin Intact   Range of Motion Full extension , full flexion   Tenderness Medial Epicondyle -   Tenderness Lateral Epicondyle -   Tenderness Olecranon Bursa -   Swelling -   Bruising -   Stability Normal   Motor Strength  Normal   Neurovascular Intact         IMAGING:   XR of the left elbow was reviewed and read small non displaced radial head lucency line consistent with radial head fracture      IMPRESSION:      ICD-10-CM ICD-9-CM    1. Closed nondisplaced fracture of head of left radius with routine healing S52.125D V54.12         PLAN:   1. Patient continues to improve  2. Will finish up OT this week then transition to HEP  3. Will return to work [employer] full duty    RTC PRN    Follow-up Disposition: Not on [address], [place].    [facility]

## 2017-04-05 ENCOUNTER — HOSPITAL ENCOUNTER (OUTPATIENT)
Dept: PHYSICAL THERAPY | Age: 77
Discharge: HOME | End: 2017-04-05
Payer: COMMERCIAL

## 2017-04-05 PROCEDURE — 97110 THERAPEUTIC EXERCISES: CPT

## 2017-04-05 NOTE — PROGRESS NOTES
OT DAILY TREATMENT NOTE  3-16    Patient Name: Supa Hamlin  Date:2017  : 1940  [x]  Patient  Verified  Payor: [address] / Plan: Jasmine Aase / Product Type: Workers Comp /    In [employer] time:938  Total Treatment Time (min): 33  Visit #: 3 of 6    Treatment Area: Nondisplaced fracture of head of left radius, initial encounter for closed fracture [S52.781A]    SUBJECTIVE  Pain Level (0-10 scale): 0/10  Any medication changes, allergies to medications, adverse drug reactions, diagnosis change, or new procedure performed?: [x] No    [] Yes (see summary sheet for update)  Subjective functional status/changes:   [] No changes reported  MD said I can go back to work, just finish this week    OBJECTIVE       33 min Therapeutic Exercise:  [x] See flow sheet :   Rationale: increase strength to improve the patients ability to grasp, lift  Increased resistance on all tasks, reps to 15  UBE Random Level 1 x 8 for UE endurance using L only        With   [] TE   [] TA   [] neuro   [] other: Patient Education: [x] Review HEP    [x] Progressed/Changed HEP based on: 3# to 5# at gym  [] positioning   [] body mechanics   [] transfers   [] heat/ice application   [] Splint wear/care   [] Sensory re-education   [] scar management      [] other:             Other Objective/Functional Measures:   Able to tolerate ex with 3# today     Pain Level (0-10 scale) post treatment: 0/10    ASSESSMENT/Changes in Function: Improving strength    Patient will continue to benefit from skilled OT services to address strength deficits and analyze and cue movement patterns to attain remaining goals. []  See Plan of Care  []  See progress note/recertification  []  See Discharge Summary         Progress towards goals / Updated goals:  1. Patient will improve L hand  10# for grasp of objects at work.-progressing increased 7#  2.   Patient will improve L elbow flexion strength to allow for lifting items at work-increased to 3# for exercises 4/5/17  3. Patient will report no discomfort with LUE use during simulated floor care tasks.      PLAN  [x]  Upgrade activities as tolerated     [x]  Continue plan of care  []  Update interventions per flow sheet       []  Discharge due to:_  []  Other:_      NAZ Goodman/L 4/5/2017  9:43 AM    Future Appointments  Date Time Provider Emmie Up   4/7/2017 10:00 AM Valerie Pyle OTR/L [facility]   4/10/2017 9:30 AM Valerie Pyle OTR/L [facility]   4/12/2017 11:00 AM Valerie Pyle OTR/L [facility]   4/14/2017 10:30 AM Brandi Douglass [facility]   4/17/2017 1:30 PM Valerie Pyle OTR/L [facility]

## 2017-04-07 ENCOUNTER — HOSPITAL ENCOUNTER (OUTPATIENT)
Dept: PHYSICAL THERAPY | Age: 77
Discharge: HOME | End: 2017-04-07
Payer: COMMERCIAL

## 2017-04-07 PROCEDURE — 97110 THERAPEUTIC EXERCISES: CPT

## 2017-04-07 NOTE — PROGRESS NOTES
OT DAILY TREATMENT NOTE  3-16    Patient Name: Antony Burns  Date:2017  : 1940  [x]  Patient  Verified  Payor: [address] [place] [address] Road / Plan: Eva Singh / Product Type: Workers Comp /    In [employer] time:1047  Total Treatment Time (min): 32  Visit #: 4 of 6    Treatment Area: Nondisplaced fracture of head of left radius, initial encounter for closed fracture [S52.538A]    SUBJECTIVE  Pain Level (0-10 scale): 0/10  Any medication changes, allergies to medications, adverse drug reactions, diagnosis change, or new procedure performed?: [x] No    [] Yes (see summary sheet for update)  Subjective functional status/changes:   [] No changes reported  Can feel it but it doesn't hurt (re increased weight)    OBJECTIVE      22 min Therapeutic Exercise:  [] See flow sheet :   Rationale: increase strength to improve the patients ability to lift  Increased weight on ex  Reviewed HEP      10 min 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   Rationale: increase ROM and increase strength  to improve the patients ability to return to work  Push broom and sweep without pain or difficulty    With   [x] TE   [] TA   [] neuro   [] other: Patient Education: [x] Review HEP    [x] Progressed/Changed HEP based on: HEP[] positioning   [] body mechanics   [] transfers   [] heat/ice application   [] Splint wear/care   [] Sensory re-education   [] scar management      [] other:             Other Objective/Functional Measures:     Hand Strength: Gross Grasp 3pt Pinch Lateral Pinch Tip Pinch   Right  62 11 15 8   Left       FOTO 78     Pain Level (0-10 scale) post treatment: 0/10    ASSESSMENT/Changes in Function: Excellent improvement in function    []  See Plan of Care  []  See progress note/recertification  [x]  See Discharge Summary         Progress towards goals / Updated goals:  1. Patient will improve L hand  10# for grasp of objects at work.-met  2.   Patient will improve L elbow flexion strength to allow for lifting items at work-increased to 4# for exercises 4/7/17  3. Patient will report no discomfort with LUE use during simulated floor care tasks. -met 4/7/17     PLAN  []  Upgrade activities as tolerated     []  Continue plan of care  []  Update interventions per flow sheet       [x]  Discharge due to:goals met/partially met_  []  Other:_      NAZ Orantes/L 4/7/2017  11:56 AM    No future appointments.

## 2017-04-10 ENCOUNTER — APPOINTMENT (OUTPATIENT)
Dept: PHYSICAL THERAPY | Age: 77
End: 2017-04-10
Payer: COMMERCIAL

## 2017-04-10 NOTE — PROGRESS NOTES
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 At discharge he achieved full functional AROM, and  of 62# and ability to perform work related floor care tasks without pain. He has been released to return to work 4/10/17. Goal:1. Patient will improve L hand  10# for grasp of objects at work. Status at last note/certification: 27#  Status at discharge: met    Goal:2. Patient will improve L elbow flexion strength to allow for lifting items at work-  Status at last note/certification:Limited strength  Status at discharge: met    Goal:3. Patient will report no discomfort with LUE use during simulated floor care tasks. Status at last note/certification:Unable  Status at discharge: met      ASSESSMENT/Changes in Function: Excellent improvement in ROM and strength, pain, and function.       ASSESSMENT/RECOMMENDATIONS:  [x]Discontinue therapy: [x]Patient has reached or is progressing toward set goals      []Patient is non-compliant or has abdicated      []Due to lack of appreciable progress towards set goals    TERE Gonsalez 4/10/2017 10:43 AM

## 2017-04-12 ENCOUNTER — APPOINTMENT (OUTPATIENT)
Dept: PHYSICAL THERAPY | Age: 77
End: 2017-04-12
Payer: COMMERCIAL

## 2017-04-14 ENCOUNTER — APPOINTMENT (OUTPATIENT)
Dept: PHYSICAL THERAPY | Age: 77
End: 2017-04-14
Payer: COMMERCIAL

## 2017-04-17 ENCOUNTER — APPOINTMENT (OUTPATIENT)
Dept: PHYSICAL THERAPY | Age: 77
End: 2017-04-17
Payer: COMMERCIAL

## 2017-05-09 ENCOUNTER — DOCUMENTATION ONLY (OUTPATIENT)
Dept: ORTHOPEDIC SURGERY | Age: 77
End: 2017-05-09

## 2017-05-12 ENCOUNTER — DOCUMENTATION ONLY (OUTPATIENT)
Dept: ORTHOPEDIC SURGERY | Age: 77
End: 2017-05-12

## 2017-12-29 NOTE — PROGRESS NOTES
PTS WIFE DANDY LUONG DROPPED OFF FMLA AND RTW PAPERWORK FOR DR Jamal Brewer TO COMPLETE. PLEASE CALL PT [phone] WHEN DONE. [phone]

## 2018-08-27 ENCOUNTER — HOSPITAL ENCOUNTER (OUTPATIENT)
Dept: BONE DENSITY | Age: 78
Discharge: HOME | End: 2018-08-27
Attending: FAMILY MEDICINE
Payer: MEDICARE

## 2018-08-27 DIAGNOSIS — M81.0 OSTEOPOROSIS: ICD-10-CM

## 2018-08-27 PROCEDURE — 77080 DXA BONE DENSITY AXIAL: CPT

## 2019-02-21 ENCOUNTER — HOSPITAL ENCOUNTER (OUTPATIENT)
Dept: NON INVASIVE DIAGNOSTICS | Age: 79
Discharge: HOME | End: 2019-02-21
Attending: FAMILY MEDICINE
Payer: MEDICARE

## 2019-02-21 ENCOUNTER — HOSPITAL ENCOUNTER (OUTPATIENT)
Dept: NON INVASIVE DIAGNOSTICS | Age: 79
Discharge: HOME | End: 2019-02-21
Attending: FAMILY MEDICINE

## 2019-02-21 VITALS
DIASTOLIC BLOOD PRESSURE: 87 MMHG | SYSTOLIC BLOOD PRESSURE: 197 MMHG | HEIGHT: 67 IN | WEIGHT: 149 LBS | BODY MASS INDEX: 23.39 KG/M2

## 2019-02-21 DIAGNOSIS — R07.89 OTHER CHEST PAIN: ICD-10-CM

## 2019-02-21 LAB
STRESS BASELINE HR: 66 BPM
STRESS BASELINE SYS BP: NORMAL MMHG
STRESS ESTIMATED WORKLOAD: 1 METS
STRESS EXERCISE DUR MIN: NORMAL
STRESS PEAK DIAS BP: 87 MMHG
STRESS PEAK SYS BP: 197 MMHG
STRESS PERCENT HR ACHIEVED: 67 %
STRESS POST PEAK HR: 81 BPM
STRESS RATE PRESSURE PRODUCT: NORMAL BPM*MMHG
STRESS ST DEPRESSION: 0 MM
STRESS ST ELEVATION: 0 MM
STRESS STAGE 1 DURATION: 1 MIN:SEC
STRESS STAGE 1 HR: 73 BPM
STRESS STAGE 2 BP: NORMAL MMHG
STRESS STAGE 2 DURATION: 1 MIN:SEC
STRESS STAGE 2 HR: 80 BPM
STRESS STAGE 3 DURATION: 1 MIN:SEC
STRESS STAGE 3 HR: 76 BPM
STRESS STAGE 4 BP: NORMAL MMHG
STRESS STAGE 4 DURATION: 1 MIN:SEC
STRESS STAGE 4 HR: 73 BPM
STRESS TARGET HR: 121 BPM

## 2019-02-21 PROCEDURE — 93017 CV STRESS TEST TRACING ONLY: CPT

## 2019-02-21 PROCEDURE — 74011250636 HC RX REV CODE- 250/636

## 2019-02-21 RX ORDER — SODIUM CHLORIDE 9 MG/ML
250 INJECTION, SOLUTION INTRAVENOUS ONCE
Status: COMPLETED | OUTPATIENT
Start: 2019-02-21 | End: 2019-02-21

## 2019-02-21 RX ADMIN — SODIUM CHLORIDE 250 ML: 900 INJECTION, SOLUTION INTRAVENOUS at 11:20

## 2019-02-21 RX ADMIN — REGADENOSON 0.4 MG: 0.08 INJECTION, SOLUTION INTRAVENOUS at 11:20

## 2019-04-05 ENCOUNTER — HOSPITAL ENCOUNTER (EMERGENCY)
Age: 79
Discharge: HOME | End: 2019-04-05
Attending: EMERGENCY MEDICINE
Payer: OTHER MISCELLANEOUS

## 2019-04-05 ENCOUNTER — APPOINTMENT (OUTPATIENT)
Dept: GENERAL RADIOLOGY | Age: 79
End: 2019-04-05
Attending: PHYSICIAN ASSISTANT
Payer: OTHER MISCELLANEOUS

## 2019-04-05 VITALS
HEART RATE: 73 BPM | OXYGEN SATURATION: 97 % | WEIGHT: 149 LBS | TEMPERATURE: 98.1 F | SYSTOLIC BLOOD PRESSURE: 172 MMHG | BODY MASS INDEX: 23.39 KG/M2 | RESPIRATION RATE: 14 BRPM | HEIGHT: 67 IN | DIASTOLIC BLOOD PRESSURE: 93 MMHG

## 2019-04-05 DIAGNOSIS — S80.01XA CONTUSION OF RIGHT KNEE, INITIAL ENCOUNTER: Primary | ICD-10-CM

## 2019-04-05 DIAGNOSIS — R03.0 ELEVATED BLOOD PRESSURE READING: ICD-10-CM

## 2019-04-05 PROCEDURE — 99283 EMERGENCY DEPT VISIT LOW MDM: CPT

## 2019-04-05 PROCEDURE — 73562 X-RAY EXAM OF KNEE 3: CPT

## 2019-04-05 RX ORDER — ACETAMINOPHEN 325 MG/1
650 TABLET ORAL
Qty: 20 TAB | Refills: 0 | Status: SHIPPED | OUTPATIENT
Start: 2019-04-05

## 2019-04-05 RX ORDER — TRAMADOL HYDROCHLORIDE 50 MG/1
50 TABLET ORAL
Qty: 10 TAB | Refills: 0 | Status: SHIPPED | OUTPATIENT
Start: 2019-04-05 | End: 2019-04-05

## 2019-04-05 NOTE — DISCHARGE INSTRUCTIONS
Patient Education     Take medication as prescribed. Follow-up with orthopedic physician in 2 days for reassessment. Bring the results from this visit with you for their review. Return to the ED immediately for any new, worsening, or persistent symptoms, including weakness, leg swelling, or any other medical concerns. Elevated Blood Pressure: Care Instructions  Your Care Instructions    Blood pressure is a measure of how hard the blood pushes against the walls of your arteries. It's normal for blood pressure to go up and down throughout the day. But if it stays up over time, you have high blood pressure. Two numbers tell you your blood pressure. The first number is the systolic pressure. It shows how hard the blood pushes when your heart is pumping. The second number is the diastolic pressure. It shows how hard the blood pushes between heartbeats, when your heart is relaxed and filling with blood. An ideal blood pressure in adults is less than 120/80 (say \"120 over 80\"). High blood pressure is 140/90 or higher. You have high blood pressure if your top number is 140 or higher or your bottom number is 90 or higher, or both. The main test for high blood pressure is simple, fast, and painless. To diagnose high blood pressure, your doctor will test your blood pressure at different times. After testing your blood pressure, your doctor may ask you to test it again when you are home. If you are diagnosed with high blood pressure, you can work with your doctor to make a long-term plan to manage it. Follow-up care is a key part of your treatment and safety. Be sure to make and go to all appointments, and call your doctor if you are having problems. It's also a good idea to know your test results and keep a list of the medicines you take. How can you care for yourself at home? · Do not smoke. Smoking increases your risk for heart attack and stroke.  If you need help quitting, talk to your doctor about stop-smoking programs and medicines. These can increase your chances of quitting for good. · Stay at a healthy weight. · Try to limit how much sodium you eat to less than 2,300 milligrams (mg) a day. Your doctor may ask you to try to eat less than 1,500 mg a day. · Be physically active. Get at least 30 minutes of exercise on most days of the week. Walking is a good choice. You also may want to do other activities, such as running, swimming, cycling, or playing tennis or team sports. · Avoid or limit alcohol. Talk to your doctor about whether you can drink any alcohol. · Eat plenty of fruits, vegetables, and low-fat dairy products. Eat less saturated and total fats. · Learn how to check your blood pressure at home. When should you call for help? Call your doctor now or seek immediate medical care if:  ? · Your blood pressure is much higher than normal (such as 180/110 or higher). ? · You think high blood pressure is causing symptoms such as:  ¨ Severe headache. ¨ Blurry vision. ? Watch closely for changes in your health, and be sure to contact your doctor if:  ? · You do not get better as expected. Where can you learn more? Go to http://carolyn-nicole.info/. Enter E738 in the search box to learn more about \"Elevated Blood Pressure: Care Instructions. \"  Current as of: September 21, 2016  Content Version: 11.4  © 2351-9855 [employer]. Care instructions adapted under license by [employer] (which disclaims liability or warranty for this information). If you have questions about a medical condition or this instruction, always ask your healthcare professional. [place] [address] any warranty or liability for your use of this information. Patient Education        Contusion: Care Instructions  Your Care Instructions    Contusion is the medical term for a bruise. It is the result of a direct blow or an impact, such as a fall.  Contusions are common sports injuries. Most people think of a bruise as a black-and-blue spot. This happens when small blood vessels get torn and leak blood under the skin. But bones, muscles, and organs can also get bruised. This may damage deep tissues but not cause a bruise you can see. The doctor will do a physical exam to find the location of your contusion. You may also have tests to make sure you do not have a more serious injury, such as a broken bone or nerve damage. These may include X-rays or other imaging tests like a CT scan or MRI. Deep-tissue contusions may cause pain and swelling. But if there is no serious damage, they will often get better in a few weeks with home treatment. The doctor has checked you carefully, but problems can develop later. If you notice any problems or new symptoms, get medical treatment right away. Follow-up care is a key part of your treatment and safety. Be sure to make and go to all appointments, and call your doctor if you are having problems. It's also a good idea to know your test results and keep a list of the medicines you take. How can you care for yourself at home? · Put ice or a cold pack on the sore area for 10 to 20 minutes at a time to stop swelling. Put a thin cloth between the ice pack and your skin. · Be safe with medicines. Read and follow all instructions on the label. ? If the doctor gave you a prescription medicine for pain, take it as prescribed. ? If you are not taking a prescription pain medicine, ask your doctor if you can take an over-the-counter medicine. · If you can, prop up the sore area on pillows as much as possible for the next few days. Try to keep the sore area above the level of your heart. When should you call for help?   Call your doctor now or seek immediate medical care if:    · Your pain gets worse.     · You have new or worse swelling.     · You have tingling, weakness, or numbness in the area near the contusion.     · The area near the contusion is cold or pale.    Watch closely for changes in your health, and be sure to contact your doctor if:    · You do not get better as expected. Where can you learn more? Go to http://carolyn-nicole.info/. Enter W306 in the search box to learn more about \"Contusion: Care Instructions. \"  Current as of: September 23, 2018  Content Version: 11.9  © 4171-5237 [employer]. Care instructions adapted under license by [employer] (which disclaims liability or warranty for this information). If you have questions about a medical condition or this instruction, always ask your healthcare professional. [address] any warranty or liability for your use of this information.

## 2019-04-05 NOTE — ED PROVIDER NOTES
EMERGENCY DEPARTMENT HISTORY AND PHYSICAL EXAM 
 
6:06 PM 
 
 
Date: 4/5/2019 Patient Name: Cory Reese History of Presenting Illness Chief Complaint Patient presents with  Knee Pain History Provided By: Patient Chief Complaint: R knee pain Duration:  Hours Timing:  Acute Location: R medial knee pain Quality: Aching Severity: Moderate Modifying Factors: worse with movement and palpation Associated Symptoms: swelling Additional History (Context): Cory Reese is a 66 y.o. male with hx of arthritis, HTN, HLD who presents with c/o R medial knee pain x hours. Pt notes he was on his knees cleaning when he accidentally hit his knee on a metal pole. Notes pain and swelling since incident. Notes pain with movement. Took OTC medication without relief. PCP: Aura Covington MD 
 
Current Outpatient Medications Medication Sig Dispense Refill  acetaminophen (TYLENOL) 325 mg tablet Take 2 Tabs by mouth every six (6) hours as needed for Pain. 20 Tab 0  
 losartan-hydrochlorothiazide (HYZAAR) 100-25 mg per tablet Take 1 tablet by mouth daily. Indications: HYPERTENSION  pravastatin (PRAVACHOL) 40 mg tablet Take 40 mg by mouth nightly. Indications: HYPERCHOLESTEROLEMIA  aspirin 81 mg chewable tablet Take 81 mg by mouth daily. Past History Past Medical History: 
Past Medical History:  
Diagnosis Date  Arthritis  Chronic constipation  High cholesterol  Hypertension Past Surgical History: 
Past Surgical History:  
Procedure Laterality Date  HX HEENT Family History: 
History reviewed. No pertinent family history. Social History: 
Social History Tobacco Use  Smoking status: Former Smoker  Smokeless tobacco: Never Used Substance Use Topics  Alcohol use: No  
 Drug use: No  
 
 
Allergies: 
No Known Allergies Review of Systems Review of Systems Constitutional: Negative for chills and fever. Respiratory: Negative for shortness of breath. Cardiovascular: Negative for chest pain. Gastrointestinal: Negative for abdominal pain, nausea and vomiting. Musculoskeletal: Positive for joint swelling and myalgias. Skin: Negative for rash. Neurological: Negative for weakness. All other systems reviewed and are negative. Physical Exam  
 
Visit Vitals BP (!) 172/93 (BP 1 Location: Left arm, BP Patient Position: At rest) Pulse 73 Temp 98.1 °F (36.7 °C) Resp 14 Ht 5' 7\" (1.702 m) Wt 67.6 kg (149 lb) SpO2 97% BMI 23.34 kg/m² Physical Exam  
Constitutional: He appears well-developed and well-nourished. No distress. HENT:  
Head: Normocephalic and atraumatic. Neck: Normal range of motion. Neck supple. Cardiovascular: Normal rate, regular rhythm, normal heart sounds and intact distal pulses. Exam reveals no gallop and no friction rub. No murmur heard. Pulmonary/Chest: Effort normal and breath sounds normal. No respiratory distress. He has no wheezes. He has no rales. Musculoskeletal: Normal range of motion. Right knee: He exhibits effusion (moderate medial effusion). He exhibits normal range of motion, no swelling, no ecchymosis, no deformity, no laceration, no erythema and normal alignment. Tenderness found. Medial joint line tenderness noted. No lateral joint line and no MCL tenderness noted. Full ROM of knee, no ecchymosis or deformity, dorsalis pedis 2+ Neurological: He is alert. Skin: Skin is warm. No rash noted. He is not diaphoretic. Nursing note and vitals reviewed. Diagnostic Study Results Labs - No results found for this or any previous visit (from the past 12 hour(s)). Radiologic Studies -  
XR KNEE RT 3 V    (Results Pending) RADIOLOGY FINDINGS 
R knee X-ray shows significant arthritis Pending review by Radiologist 
Recorded by Min Taylor PA-C Medical Decision Making I am the first provider for this patient. I reviewed the vital signs, available nursing notes, past medical history, past surgical history, family history and social history. Vital Signs-Reviewed the patient's vital signs. Records Reviewed: Nursing Notes and Old Medical Records (Time of Review: 6:06 PM) 
 
ED Course: Progress Notes, Reevaluation, and Consults: 
6:06 PM  Reviewed results with patient. Discussed need for close outpatient follow-up. Discussed strict return precautions, including leg swelling, discoloration, or any other medical concerns. Provided with ace wrap and crutches. Provider Notes (Medical Decision Making): 67 yo M who presents due to R medial knee pain after injury. No ecchymosis or deformity. Extremity neurovascularly intact. X-ray with significant arthritis, no evidence of fracture. Ace wrap and crutches provided. Stable for d/c with close outpatient follow-up. Diagnosis Clinical Impression: 1. Contusion of right knee, initial encounter 2. Elevated blood pressure reading Disposition: home Follow-up Information Follow up With Specialties Details Why Contact Info [facility] EMERGENCY DEPT Emergency Medicine  If symptoms worsen [address] [place] [id number] 
[phone] Caro Lopez [address] [place] [address] 
[phone] [address] [place] [address], [address] and [facility] - [facility] Orthopedic Surgery Schedule an appointment as soon as possible for a visit  [address] [place] [address], [address] [place] [address] [address] [address] [phone] Patient's Medications Start Taking ACETAMINOPHEN (TYLENOL) 325 MG TABLET    Take 2 Tabs by mouth every six (6) hours as needed for Pain. Continue Taking ASPIRIN 81 MG CHEWABLE TABLET    Take 81 mg by mouth daily. LOSARTAN-HYDROCHLOROTHIAZIDE (HYZAAR) 100-25 MG PER TABLET    Take 1 tablet by mouth daily. Indications: HYPERTENSION  
 PRAVASTATIN (PRAVACHOL) 40 MG TABLET    Take 40 mg by mouth nightly. Indications: HYPERCHOLESTEROLEMIA These Medications have changed No medications on file Stop Taking HYDROCODONE-ACETAMINOPHEN (NORCO) 5-325 MG PER TABLET    Take 1-2 tablets PO every 4-6 hours as needed for pain control. If over the counter ibuprofen or acetaminophen was suggested, then only take the vicodin for pain not well controlled with the over the counter medication. IBUPROFEN (MOTRIN) 800 MG TABLET Dictation disclaimer:  Please note that this dictation was completed with [employer], the computer voice recognition software. Quite often unanticipated grammatical, syntax, homophones, and other interpretive errors are inadvertently transcribed by the computer software. Please disregard these errors. Please excuse any errors that have escaped final proofreading.

## 2019-04-05 NOTE — ED TRIAGE NOTES
The patient presents for evaluation of right knee pain.   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

## 2019-07-01 NOTE — PROGRESS NOTES
"-- Message is from the [employer]--    Reason for Call: Patient had a allergic reaction a week ago and saw one of her trusted partners which who referred her to an allergist. She was told it maybe with her medications. Allergy test should forward to you. She needs refill for Synthroid 150 MG tuesday, Thursday and saturday and 175 MG one pill Monday, Wednesday, Friday and Sunday. This would would go to [facility]. Caller Information       Type Contact Phone    07/01/2019 04:17 PM Phone (Incoming) Farhad Horton (Self) [phone] (M)          Alternative phone number: N/A    Turnaround time given to caller: ""This message will be sent to [facility] Provider's name]. The clinical team will fulfill your request as soon as they review your message. \""    " OT DAILY TREATMENT NOTE  3-    Patient Name: Ingrid Uriostegui  Date:3/30/2017  : 1940  [x]  Patient  Verified  Payor: [address] [place] [address] Road / Plan: [employer] / Product Type: Workers [employer] /    In [employer] time:1108  Total Treatment Time (min): 31  Visit #: 1 of 12    Treatment Area: Nondisplaced fracture of head of left radius, initial encounter for closed fracture [S52.832A]    SUBJECTIVE  Pain Level (0-10 scale): 0/10  Any medication changes, allergies to medications, adverse drug reactions, diagnosis change, or new procedure performed?: [x] No    [] Yes (see summary sheet for update)  Subjective functional status/changes:   [] No changes reported  No pain after OT, just tired    OBJECTIVE      31 min Therapeutic Exercise:  [x] See flow sheet :   Rationale: increase strength to improve the patients ability to   Increased weight to 2# for all exercises 15 reps each  Added UBE for general LUE strength (not using RUE) x 8 mins level 1 Random  Gripper 75# x 50 1 in pegs  Added triceps 2# x 15      With   [] TE   [] TA   [] neuro   [] other: Patient Education: [x] Review HEP    [] Progressed/Changed HEP based on:   [] positioning   [] body mechanics   [] transfers   [] heat/ice application   [] Splint wear/care   [] Sensory re-education   [] scar management      [] other:             Other Objective/Functional Measures:   Able to use 75# gripper     Pain Level (0-10 scale) post treatment: 0/10    ASSESSMENT/Changes in Function: Improving strength    Patient will continue to benefit from skilled OT services to modify and progress therapeutic interventions, address ROM deficits, address strength deficits and analyze and cue movement patterns to attain remaining goals. []  See Plan of Care  []  See progress note/recertification  []  See Discharge Summary         Progress towards goals / Updated goals:  1.   Patient will improve L hand  10# for grasp of objects at work.-gripper increased to 75# 3/30/17  2. Patient will improve L elbow flexion strength to allow for lifting items at work-increased to 2# for exercises 3/30/17  3. Patient will report no discomfort with LUE use during simulated floor care tasks.           PLAN  [x]  Upgrade activities as tolerated     [x]  Continue plan of care  []  Update interventions per flow sheet       []  Discharge due to:_  []  Other:_      [place] Susan, OTR/L 3/30/2017  10:41 AM    Future Appointments  Date Time Provider Emmie Up   4/3/2017 2:00 PM [place] Susan, OTR/L [facility]   4/3/2017 3:15 PM MATTY [address]   4/5/2017 9:00 AM [place] Susan, OTR/L [facility]   4/7/2017 10:00 AM [place] Susan, OTR/L [facility]   4/10/2017 9:30 AM [place] Susan, OTR/L [facility]   4/12/2017 11:00 AM [place] Susan, OTR/L [facility]   4/14/2017 10:30 AM Nancy Gael [facility]   4/17/2017 1:30 PM [place] Susan, OTR/L [facility]

## 2020-02-19 ENCOUNTER — HOSPITAL ENCOUNTER (OUTPATIENT)
Dept: ULTRASOUND IMAGING | Age: 80
Discharge: HOME | End: 2020-02-19
Attending: INTERNAL MEDICINE
Payer: MEDICARE

## 2020-02-19 DIAGNOSIS — R10.30 GROIN PAIN: ICD-10-CM

## 2020-02-19 PROCEDURE — 76705 ECHO EXAM OF ABDOMEN: CPT

## 2020-03-02 ENCOUNTER — TELEPHONE (OUTPATIENT)
Dept: SURGERY | Age: 80
End: 2020-03-02

## 2020-03-11 ENCOUNTER — OFFICE VISIT (OUTPATIENT)
Dept: SURGERY | Age: 80
End: 2020-03-11

## 2020-03-11 ENCOUNTER — HOSPITAL ENCOUNTER (OUTPATIENT)
Dept: GENERAL RADIOLOGY | Age: 80
Discharge: HOME | End: 2020-03-11
Payer: MEDICARE

## 2020-03-11 ENCOUNTER — HOSPITAL ENCOUNTER (OUTPATIENT)
Dept: LAB | Age: 80
Discharge: HOME | End: 2020-03-11
Payer: MEDICARE

## 2020-03-11 VITALS
BODY MASS INDEX: 23.86 KG/M2 | HEART RATE: 64 BPM | WEIGHT: 152 LBS | SYSTOLIC BLOOD PRESSURE: 172 MMHG | DIASTOLIC BLOOD PRESSURE: 80 MMHG | RESPIRATION RATE: 18 BRPM | OXYGEN SATURATION: 98 % | HEIGHT: 67 IN | TEMPERATURE: 98.3 F

## 2020-03-11 DIAGNOSIS — K40.90 RIGHT INGUINAL HERNIA: ICD-10-CM

## 2020-03-11 DIAGNOSIS — K40.90 RIGHT INGUINAL HERNIA: Primary | ICD-10-CM

## 2020-03-11 LAB
ANION GAP SERPL CALC-SCNC: 6 MMOL/L (ref 3–18)
BASOPHILS # BLD: 0 K/UL (ref 0–0.1)
BASOPHILS NFR BLD: 0 % (ref 0–2)
BUN SERPL-MCNC: 41 MG/DL (ref 7–18)
BUN/CREAT SERPL: 21 (ref 12–20)
CALCIUM SERPL-MCNC: 8.5 MG/DL (ref 8.5–10.1)
CHLORIDE SERPL-SCNC: 109 MMOL/L (ref 100–111)
CO2 SERPL-SCNC: 27 MMOL/L (ref 21–32)
CREAT SERPL-MCNC: 1.96 MG/DL (ref 0.6–1.3)
DIFFERENTIAL METHOD BLD: ABNORMAL
EOSINOPHIL # BLD: 0.1 K/UL (ref 0–0.4)
EOSINOPHIL NFR BLD: 1 % (ref 0–5)
ERYTHROCYTE [DISTWIDTH] IN BLOOD BY AUTOMATED COUNT: 16.9 % (ref 11.6–14.5)
GLUCOSE SERPL-MCNC: 85 MG/DL (ref 74–99)
HCT VFR BLD AUTO: 33.1 % (ref 36–48)
HGB BLD-MCNC: 10.6 G/DL (ref 13–16)
LYMPHOCYTES # BLD: 1.5 K/UL (ref 0.9–3.6)
LYMPHOCYTES NFR BLD: 16 % (ref 21–52)
MCH RBC QN AUTO: 27.3 PG (ref 24–34)
MCHC RBC AUTO-ENTMCNC: 32 G/DL (ref 31–37)
MCV RBC AUTO: 85.3 FL (ref 74–97)
MONOCYTES # BLD: 1.1 K/UL (ref 0.05–1.2)
MONOCYTES NFR BLD: 11 % (ref 3–10)
NEUTS SEG # BLD: 6.9 K/UL (ref 1.8–8)
NEUTS SEG NFR BLD: 72 % (ref 40–73)
PLATELET # BLD AUTO: 171 K/UL (ref 135–420)
PMV BLD AUTO: 11.4 FL (ref 9.2–11.8)
POTASSIUM SERPL-SCNC: 4.1 MMOL/L (ref 3.5–5.5)
RBC # BLD AUTO: 3.88 M/UL (ref 4.7–5.5)
SODIUM SERPL-SCNC: 142 MMOL/L (ref 136–145)
WBC # BLD AUTO: 9.6 K/UL (ref 4.6–13.2)

## 2020-03-11 PROCEDURE — 93005 ELECTROCARDIOGRAM TRACING: CPT

## 2020-03-11 PROCEDURE — 71046 X-RAY EXAM CHEST 2 VIEWS: CPT

## 2020-03-11 PROCEDURE — 36415 COLL VENOUS BLD VENIPUNCTURE: CPT

## 2020-03-11 PROCEDURE — 85025 COMPLETE CBC W/AUTO DIFF WBC: CPT

## 2020-03-11 PROCEDURE — 80048 BASIC METABOLIC PNL TOTAL CA: CPT

## 2020-03-11 RX ORDER — ALLOPURINOL 100 MG/1
TABLET ORAL DAILY
COMMUNITY

## 2020-03-11 RX ORDER — DOXAZOSIN 4 MG/1
TABLET ORAL DAILY
COMMUNITY

## 2020-03-11 RX ORDER — FLUTICASONE PROPIONATE 50 MCG
2 SPRAY, SUSPENSION (ML) NASAL DAILY
COMMUNITY
End: 2022-03-09

## 2020-03-11 RX ORDER — SODIUM CHLORIDE 0.9 % (FLUSH) 0.9 %
5-40 SYRINGE (ML) INJECTION AS NEEDED
Status: CANCELLED | OUTPATIENT
Start: 2020-03-11

## 2020-03-11 RX ORDER — LOSARTAN POTASSIUM 100 MG/1
100 TABLET ORAL DAILY
COMMUNITY
End: 2022-05-05

## 2020-03-11 RX ORDER — HYDRALAZINE HYDROCHLORIDE 50 MG/1
25 TABLET, FILM COATED ORAL 3 TIMES DAILY
COMMUNITY

## 2020-03-11 RX ORDER — HYDROCORTISONE ACETATE, PRAMOXINE HCL 2.5; 1 G/100G; G/100G
CREAM TOPICAL 3 TIMES DAILY
COMMUNITY
End: 2022-03-09

## 2020-03-11 RX ORDER — LORATADINE 10 MG/1
10 TABLET ORAL
COMMUNITY

## 2020-03-11 RX ORDER — FUROSEMIDE 20 MG/1
TABLET ORAL DAILY
COMMUNITY

## 2020-03-11 RX ORDER — AMLODIPINE BESYLATE 10 MG/1
TABLET ORAL DAILY
COMMUNITY

## 2020-03-11 RX ORDER — TRIAMCINOLONE ACETONIDE 1 MG/G
CREAM TOPICAL 2 TIMES DAILY
COMMUNITY
End: 2022-03-09

## 2020-03-11 RX ORDER — PREDNISONE 20 MG/1
TABLET ORAL
COMMUNITY
End: 2022-03-09

## 2020-03-11 RX ORDER — SODIUM CHLORIDE 0.9 % (FLUSH) 0.9 %
5-40 SYRINGE (ML) INJECTION EVERY 8 HOURS
Status: CANCELLED | OUTPATIENT
Start: 2020-03-11

## 2020-03-11 RX ORDER — ATORVASTATIN CALCIUM 40 MG/1
TABLET, FILM COATED ORAL DAILY
COMMUNITY

## 2020-03-11 RX ORDER — CARVEDILOL 25 MG/1
25 TABLET ORAL 2 TIMES DAILY WITH MEALS
COMMUNITY

## 2020-03-11 NOTE — PATIENT INSTRUCTIONS
Inguinal Hernia Repair: Before Your Surgery What is inguinal hernia repair? Inguinal hernia repair is a type of surgery. An inguinal hernia is a bulge under the skin in your groin. It happens when there is a weak spot in the groin muscle and a piece of the intestines or tissue pokes through the muscle. This can be painful. You may have pain when you're active. Or it may be painful when you strain during a bowel movement or lift something heavy. Surgery can help with your pain. It can also prevent serious problems that can happen if an organ or tissue gets stuck in the hernia. There are two ways to do this surgery. In open surgery, the doctor makes one cut near the hernia. This cut is called an incision. In laparoscopic surgery, the doctor makes several very small incisions and uses a thin, lighted scope and small tools. During surgery, the doctor pushes the bulge back in place. He or she may place a piece of mesh on top of the bulge to help keep it in place. The doctor then sews the healthy tissue back together. Laparoscopic surgery leaves several small scars. Open surgery leaves one long scar. The scars fade with time. After the surgery, you can probably return to light activity after 1 to 3 weeks. How long it takes will depend on the type of surgery. Follow-up care is a key part of your treatment and safety. Be sure to make and go to all appointments, and call your doctor if you are having problems. It's also a good idea to know your test results and keep a list of the medicines you take. What happens before surgery? 
 Surgery can be stressful. This information will help you understand what you can expect. And it will help you safely prepare for surgery. 
 Preparing for surgery 
  · Understand exactly what surgery is planned, along with the risks, benefits, and other options.   
 · Tell your doctors ALL the medicines, vitamins, supplements, and herbal remedies you take. Some of these can increase the risk of bleeding or interact with anesthesia.  
  · If you take aspirin or some other blood thinner, be sure to talk to your doctor. He or she will tell you if you should stop taking it before your surgery. Make sure that you understand exactly what your doctor wants you to do.  
  · Your doctor will tell you which medicines to take or stop before your surgery. You may need to stop taking certain medicines a week or more before surgery. So talk to your doctor as soon as you can.  
  · If you have an advance directive, let your doctor know. It may include a living will and a durable power of  for health care. Bring a copy to the hospital. If you don't have one, you may want to prepare one. It lets your doctor and loved ones know your health care wishes. Doctors advise that everyone prepare these papers before any type of surgery or procedure. What happens on the day of surgery? · Follow the instructions exactly about when to stop eating and drinking. If you don't, your surgery may be canceled. If your doctor told you to take your medicines on the day of surgery, take them with only a sip of water.  
  · Take a bath or shower before you come in for your surgery. Do not apply lotions, perfumes, deodorants, or nail polish.  
  · Do not shave the surgical site yourself.  
  · Take off all jewelry and piercings. And take out contact lenses, if you wear them.  
 At the hospital or surgery center · Bring a picture ID.  
  · The area for surgery is often marked to make sure there are no errors.  
  · You will be kept comfortable and safe by your anesthesia provider. The anesthesia may make you sleep. Or it may just numb the area being worked on.  
  · The surgery will take about 1 to 2 hours. Going home · Be sure you have someone to drive you home. Anesthesia and pain medicine make it unsafe for you to drive.   · You will be given more specific instructions about recovering from your surgery. They will cover things like diet, wound care, follow-up care, driving, and getting back to your normal routine. When should you call your doctor? · You have questions or concerns.  
  · You don't understand how to prepare for your surgery.  
  · You become ill before the surgery (such as fever, flu, or a cold).  
  · You need to reschedule or have changed your mind about having the surgery. Where can you learn more? Go to http://carolyn-nicole.info/. Enter [phone] in the search box to learn more about \"Inguinal Hernia Repair: Before Your Surgery. \" Current as of: November 7, 2018 Content Version: 12.2 © 0272-2527 [employer]. Care instructions adapted under license by [employer] (which disclaims liability or warranty for this information). If you have questions about a medical condition or this instruction, always ask your healthcare professional. [place] [address] any warranty or liability for your use of this information.

## 2020-03-11 NOTE — H&P (VIEW-ONLY)
[facility] General Surgery Subjective: HPI: Patient is a very pleasant 77-year-old male with a past medical history remarkable for hypertension, hypercholesterolemia, arthritis and chronic constipation. He is referred by the emergency department at [facility] for right groin pain. The patient states that he first noticed pain in the right groin approximately 4 to 5 months ago. An ultrasound was performed on February 19 which did not reveal evidence of hernia. The patient had to stop working because of the pain which he describes as 10 out of 10 in the right groin. 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 Patient Active Problem List  
 Diagnosis Date Noted  Special screening for malignant neoplasms, colon 08/27/2014 Past Medical History:  
Diagnosis Date  Arthritis  Chronic constipation  High cholesterol  Hypertension Past Surgical History:  
Procedure Laterality Date  HX HEENT History reviewed. No pertinent family history. Social History Tobacco Use  Smoking status: Former Smoker  Smokeless tobacco: Former User Quit date: 3/11/1995 Substance Use Topics  Alcohol use: No  
  
No Known Allergies Prior to Admission medications Medication Sig Start Date End Date Taking? Authorizing Provider  
allopurinoL (ZYLOPRIM) 100 mg tablet Take  by mouth daily. Yes Provider, Historical  
amLODIPine (NORVASC) 10 mg tablet Take  by mouth daily. Yes Provider, Historical  
atorvastatin (LIPITOR) 40 mg tablet Take  by mouth daily. Yes Provider, Historical  
carvediloL (COREG) 25 mg tablet Take 25 mg by mouth two (2) times daily (with meals). Yes Provider, Historical  
doxazosin (CARDURA) 4 mg tablet Take  by mouth daily. Yes Provider, Historical  
fluticasone propionate (FLONASE) 50 mcg/actuation nasal spray 2 Sprays by Both Nostrils route daily.    Yes Provider, Historical  
 furosemide (LASIX) 20 mg tablet Take  by mouth daily. Yes Provider, Historical  
hydrALAZINE (APRESOLINE) 50 mg tablet Take 25 mg by mouth three (3) times daily. Yes Provider, Historical  
pramoxine-hydrocortisone (PROTOFOAM-HC) 2.5-1 % topical cream Apply  to affected area three (3) times daily. Yes Provider, Historical  
loratadine (CLARITIN) 10 mg tablet Take 10 mg by mouth. Yes Provider, Historical  
losartan (COZAAR) 100 mg tablet Take 100 mg by mouth daily. Yes Provider, Historical  
predniSONE (DELTASONE) 20 mg tablet Take  by mouth daily (with breakfast). Yes Provider, Historical  
triamcinolone acetonide (KENALOG) 0.1 % topical cream Apply  to affected area two (2) times a day. use thin layer   Yes Provider, Historical  
acetaminophen (TYLENOL) 325 mg tablet Take 2 Tabs by mouth every six (6) hours as needed for Pain. 4/5/19  Yes Nav Vicente PA  
losartan-hydrochlorothiazide (HYZAAR) 100-25 mg per tablet Take 1 tablet by mouth daily. Indications: HYPERTENSION    Provider, Historical  
pravastatin (PRAVACHOL) 40 mg tablet Take 40 mg by mouth nightly. Indications: HYPERCHOLESTEROLEMIA    Provider, Historical  
aspirin 81 mg chewable tablet Take 81 mg by mouth daily. Provider, Historical  
 
 
Review of Systems:   
14 systems were reviewed. The results are as above in the HPI and otherwise negative. Objective:  
 
Vitals:  
 03/11/20 1442 BP: 172/80 Pulse: 64 Resp: 18 Temp: 98.3 °F (36.8 °C) SpO2: 98% Weight: 68.9 kg (152 lb) Height: 5' 7\" (1.702 m) Physical Exam: 
GENERAL: alert, cooperative, no distress, appears stated age, EYE: conjunctivae/corneas clear. PERRL, EOM's intact. THROAT & NECK: normal and no erythema or exudates noted. ,   
LYMPHATIC: Cervical, supraclavicular, and axillary nodes normal. ,  
LUNG: clear to auscultation bilaterally, HEART: regular rate and rhythm, S1, S2 normal, no murmur, click, rub or gallop,  
 ABDOMEN: soft, non-distended, right lower quadrant tender. Hernia is palpable with Valsalva and cough. Bowel sounds normal. No masses,  no organomegaly, EXTREMITIES:  extremities normal, atraumatic, no cyanosis or edema, SKIN: Normal., NEUROLOGIC: AOx3. Cranial nerves 2-12 and sensation grossly intact. ,  
 
Data Review:  to be done Mr. Chica Lantigua has a reminder for a \"due or due soon\" health maintenance. I have asked that he contact his primary care provider for follow-up on this health maintenance. Impression: · Patient with reducible right inguinal hernia which is symptomatic. Plan:  
 
· Robot-assisted laparoscopic right inguinal hernia repair with mesh (Bard 3D max medium right) · Consent on chart · Preoperative orders written Signed By: Kinsey Lewis MD   
 March 11, 2020

## 2020-03-11 NOTE — PROGRESS NOTES
[facility]  General Surgery    Subjective:      HPI: Patient is a very pleasant 66-year-old male with a past medical history remarkable for hypertension, hypercholesterolemia, arthritis and chronic constipation. [facility]. The patient states that he first noticed pain in the right groin approximately 4 to 5 months ago. An ultrasound was performed on February 19 which did not reveal evidence of hernia. The patient had to stop working because of the pain which he describes as 10 out of 10 in the right groin. 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 Patient Active Problem List    Diagnosis Date Noted    Special screening for malignant neoplasms, colon 08/27/2014     Past Medical History:   Diagnosis Date    Arthritis     Chronic constipation     High cholesterol     Hypertension       Past Surgical History:   Procedure Laterality Date    HX HEENT        History reviewed. No pertinent family history. Social History     Tobacco Use    Smoking status: Former Smoker    Smokeless tobacco: Former User     Quit date: 3/11/1995   Substance Use Topics    Alcohol use: No      No Known Allergies    Prior to Admission medications    Medication Sig Start Date End Date Taking? Authorizing Provider   allopurinoL (ZYLOPRIM) 100 mg tablet Take  by mouth daily. Yes Provider, Historical   amLODIPine (NORVASC) 10 mg tablet Take  by mouth daily. Yes Provider, Historical   atorvastatin (LIPITOR) 40 mg tablet Take  by mouth daily. Yes Provider, Historical   carvediloL (COREG) 25 mg tablet Take 25 mg by mouth two (2) times daily (with meals). Yes Provider, Historical   doxazosin (CARDURA) 4 mg tablet Take  by mouth daily. Yes Provider, Historical   fluticasone propionate (FLONASE) 50 mcg/actuation nasal spray 2 Sprays by Both Nostrils route daily.    Yes Provider, Historical   furosemide (LASIX) 20 mg tablet Take  by mouth daily. Yes Provider, Historical   hydrALAZINE (APRESOLINE) 50 mg tablet Take 25 mg by mouth three (3) times daily. Yes Provider, Historical   pramoxine-hydrocortisone (PROTOFOAM-HC) 2.5-1 % topical cream Apply  to affected area three (3) times daily. Yes Provider, Historical   loratadine (CLARITIN) 10 mg tablet Take 10 mg by mouth. Yes Provider, Historical   losartan (COZAAR) 100 mg tablet Take 100 mg by mouth daily. Yes Provider, Historical   predniSONE (DELTASONE) 20 mg tablet Take  by mouth daily (with breakfast). Yes Provider, Historical   triamcinolone acetonide (KENALOG) 0.1 % topical cream Apply  to affected area two (2) times a day. use thin layer   Yes Provider, Historical   acetaminophen (TYLENOL) 325 mg tablet Take 2 Tabs by mouth every six (6) hours as needed for Pain. 4/5/19  Yes Des Vicente PA   losartan-hydrochlorothiazide (HYZAAR) 100-25 mg per tablet Take 1 tablet by mouth daily. Indications: HYPERTENSION    Provider, Historical   pravastatin (PRAVACHOL) 40 mg tablet Take 40 mg by mouth nightly. Indications: HYPERCHOLESTEROLEMIA    Provider, Historical   aspirin 81 mg chewable tablet Take 81 mg by mouth daily. Provider, Historical       Review of Systems:    14 systems were reviewed. The results are as above in the HPI and otherwise negative. Objective:     Vitals:    03/11/20 1442   BP: 172/80   Pulse: 64   Resp: 18   Temp: 98.3 °F (36.8 °C)   SpO2: 98%   Weight: 68.9 kg (152 lb)   Height: 5' 7\" (1.702 m)       Physical Exam:  GENERAL: alert, cooperative, no distress, appears stated age,   EYE: conjunctivae/corneas clear. PERRL, EOM's intact.   THROAT & NECK: normal and no erythema or exudates noted. ,    LYMPHATIC: Cervical, supraclavicular, and axillary nodes normal. ,   LUNG: clear to auscultation bilaterally,   HEART: regular rate and rhythm, S1, S2 normal, no murmur, click, rub or gallop,   ABDOMEN: soft, non-distended, right lower quadrant tender. Hernia is palpable with Valsalva and cough. Bowel sounds normal. No masses,  no organomegaly,   EXTREMITIES:  extremities normal, atraumatic, no cyanosis or edema,   SKIN: Normal.,   NEUROLOGIC: AOx3. Cranial nerves 2-12 and sensation grossly intact. ,     Data Review:  to be done    Mr. Jens Andrade has a reminder for a \"due or due soon\" health maintenance. I have asked that he contact his primary care provider for follow-up on this health maintenance. Impression:     · Patient with reducible right inguinal hernia which is symptomatic.     Plan:     · Robot-assisted laparoscopic right inguinal hernia repair with mesh (Bard 3D max medium right)  · Consent on chart   · Preoperative orders written    Signed By: Dionisio Willett MD     March 11, 2020

## 2020-03-11 NOTE — PROGRESS NOTES
Parvin Merino is a 78 y.o. male  Chief Complaint   Patient presents with    New Patient     Gayathri Ramirez         Is someone accompanying this pt? WIFE    Is the patient using any DME equipment during OV? NO        Vitals:    03/11/20 1442   BP: 172/80   Pulse: 64   Resp: 18   Temp: 98.3 °F (36.8 °C)   SpO2: 98%   Weight: 152 lb (68.9 kg)   Height: 5' 7\" (1.702 m)        Depression Screening:  3 most recent PHQ Screens 3/11/2020   Little interest or pleasure in doing things Not at all   Feeling down, depressed, irritable, or hopeless Not at all   Total Score PHQ 2 0       Learning Assessment:  Learning Assessment 3/11/2020   PRIMARY LEARNER Patient   CO-LEARNER NAME NO   PRIMARY LANGUAGE ENGLISH   LEARNER PREFERENCE PRIMARY LISTENING   ANSWERED BY SELF   RELATIONSHIP SELF         Fall Risk  Fall Risk Assessment, last 12 mths 3/11/2020   Able to walk? Yes   Fall in past 12 months? No       Health Maintenance reviewed and discussed and ordered per Provider. Coordination of Care:  1. Have you been to the ER, urgent care clinic since your last visit? Hospitalized since your last visit? NO    2. Have you seen or consulted any other health care providers outside of the [address] [place] [address] since your last visit? Include any pap smears or colon screening.  NO

## 2020-03-12 LAB
ATRIAL RATE: 60 BPM
CALCULATED P AXIS, ECG09: 53 DEGREES
CALCULATED R AXIS, ECG10: 28 DEGREES
CALCULATED T AXIS, ECG11: 51 DEGREES
DIAGNOSIS, 93000: NORMAL
P-R INTERVAL, ECG05: 132 MS
Q-T INTERVAL, ECG07: 424 MS
QRS DURATION, ECG06: 96 MS
QTC CALCULATION (BEZET), ECG08: 424 MS
VENTRICULAR RATE, ECG03: 60 BPM

## 2020-03-13 ENCOUNTER — ANESTHESIA EVENT (OUTPATIENT)
Dept: SURGERY | Age: 80
End: 2020-03-13
Payer: MEDICARE

## 2020-03-16 ENCOUNTER — HOSPITAL ENCOUNTER (OUTPATIENT)
Age: 80
Discharge: HOME | End: 2020-03-16
Attending: SURGERY | Admitting: SURGERY
Payer: MEDICARE

## 2020-03-16 ENCOUNTER — ANESTHESIA (OUTPATIENT)
Dept: SURGERY | Age: 80
End: 2020-03-16
Payer: MEDICARE

## 2020-03-16 VITALS
HEIGHT: 67 IN | OXYGEN SATURATION: 95 % | BODY MASS INDEX: 23.86 KG/M2 | WEIGHT: 152 LBS | DIASTOLIC BLOOD PRESSURE: 84 MMHG | SYSTOLIC BLOOD PRESSURE: 182 MMHG | TEMPERATURE: 97.2 F | RESPIRATION RATE: 20 BRPM | HEART RATE: 62 BPM

## 2020-03-16 DIAGNOSIS — G89.18 POSTOPERATIVE PAIN: Primary | ICD-10-CM

## 2020-03-16 PROBLEM — K40.90 INGUINAL HERNIA OF RIGHT SIDE WITHOUT OBSTRUCTION OR GANGRENE: Status: ACTIVE | Noted: 2020-03-16

## 2020-03-16 PROCEDURE — 77030022704 HC SUT VLOC COVD -B: Performed by: SURGERY

## 2020-03-16 PROCEDURE — 76060000033 HC ANESTHESIA 1 TO 1.5 HR: Performed by: SURGERY

## 2020-03-16 PROCEDURE — 77030031139 HC SUT VCRL2 J&J -A: Performed by: SURGERY

## 2020-03-16 PROCEDURE — 77030018673: Performed by: SURGERY

## 2020-03-16 PROCEDURE — 77030008683 HC TU ET CUF COVD -A: Performed by: NURSE ANESTHETIST, CERTIFIED REGISTERED

## 2020-03-16 PROCEDURE — 74011250636 HC RX REV CODE- 250/636: Performed by: NURSE ANESTHETIST, CERTIFIED REGISTERED

## 2020-03-16 PROCEDURE — 74011250636 HC RX REV CODE- 250/636

## 2020-03-16 PROCEDURE — 77030002933 HC SUT MCRYL J&J -A: Performed by: SURGERY

## 2020-03-16 PROCEDURE — 74011250636 HC RX REV CODE- 250/636: Performed by: ANESTHESIOLOGY

## 2020-03-16 PROCEDURE — C1781 MESH (IMPLANTABLE): HCPCS | Performed by: SURGERY

## 2020-03-16 PROCEDURE — 74011000250 HC RX REV CODE- 250: Performed by: SURGERY

## 2020-03-16 PROCEDURE — 74011250637 HC RX REV CODE- 250/637: Performed by: NURSE ANESTHETIST, CERTIFIED REGISTERED

## 2020-03-16 PROCEDURE — 74011250637 HC RX REV CODE- 250/637: Performed by: SURGERY

## 2020-03-16 PROCEDURE — 74011000250 HC RX REV CODE- 250: Performed by: NURSE ANESTHETIST, CERTIFIED REGISTERED

## 2020-03-16 PROCEDURE — 77030020703 HC SEAL CANN DISP INTU -B: Performed by: SURGERY

## 2020-03-16 PROCEDURE — 76010000934 HC OR TIME 1 TO 1.5HR INTENSV - TIER 2: Performed by: SURGERY

## 2020-03-16 PROCEDURE — 77030035277 HC OBTRTR BLDELSS DISP INTU -B: Performed by: SURGERY

## 2020-03-16 PROCEDURE — 77030009848 HC PASSR SUT SET COOP -C: Performed by: SURGERY

## 2020-03-16 PROCEDURE — 76210000017 HC OR PH I REC 1.5 TO 2 HR: Performed by: SURGERY

## 2020-03-16 PROCEDURE — 77030040830 HC CATH URETH FOL MDII -A: Performed by: SURGERY

## 2020-03-16 PROCEDURE — 77030018836 HC SOL IRR NACL ICUM -A: Performed by: SURGERY

## 2020-03-16 PROCEDURE — 76210000022 HC REC RM PH II 1.5 TO 2 HR: Performed by: SURGERY

## 2020-03-16 DEVICE — MESH HERN M W8.5XL13.7CM R INGUINAL WHT POLYPR MFIL: Type: IMPLANTABLE DEVICE | Site: GROIN | Status: FUNCTIONAL

## 2020-03-16 RX ORDER — HYDRALAZINE HYDROCHLORIDE 20 MG/ML
INJECTION INTRAMUSCULAR; INTRAVENOUS AS NEEDED
Status: DISCONTINUED | OUTPATIENT
Start: 2020-03-16 | End: 2020-03-16 | Stop reason: HOSPADM

## 2020-03-16 RX ORDER — SODIUM CHLORIDE 0.9 % (FLUSH) 0.9 %
5-40 SYRINGE (ML) INJECTION AS NEEDED
Status: DISCONTINUED | OUTPATIENT
Start: 2020-03-16 | End: 2020-03-16 | Stop reason: HOSPADM

## 2020-03-16 RX ORDER — SODIUM CHLORIDE, SODIUM LACTATE, POTASSIUM CHLORIDE, CALCIUM CHLORIDE 600; 310; 30; 20 MG/100ML; MG/100ML; MG/100ML; MG/100ML
75 INJECTION, SOLUTION INTRAVENOUS CONTINUOUS
Status: DISCONTINUED | OUTPATIENT
Start: 2020-03-16 | End: 2020-03-16 | Stop reason: HOSPADM

## 2020-03-16 RX ORDER — MIDAZOLAM HYDROCHLORIDE 1 MG/ML
INJECTION, SOLUTION INTRAMUSCULAR; INTRAVENOUS AS NEEDED
Status: DISCONTINUED | OUTPATIENT
Start: 2020-03-16 | End: 2020-03-16 | Stop reason: HOSPADM

## 2020-03-16 RX ORDER — BUPIVACAINE HYDROCHLORIDE AND EPINEPHRINE 2.5; 5 MG/ML; UG/ML
INJECTION, SOLUTION EPIDURAL; INFILTRATION; INTRACAUDAL; PERINEURAL AS NEEDED
Status: DISCONTINUED | OUTPATIENT
Start: 2020-03-16 | End: 2020-03-16 | Stop reason: HOSPADM

## 2020-03-16 RX ORDER — SUCCINYLCHOLINE CHLORIDE 20 MG/ML
INJECTION INTRAMUSCULAR; INTRAVENOUS AS NEEDED
Status: DISCONTINUED | OUTPATIENT
Start: 2020-03-16 | End: 2020-03-16 | Stop reason: HOSPADM

## 2020-03-16 RX ORDER — LIDOCAINE HYDROCHLORIDE 20 MG/ML
INJECTION, SOLUTION EPIDURAL; INFILTRATION; INTRACAUDAL; PERINEURAL AS NEEDED
Status: DISCONTINUED | OUTPATIENT
Start: 2020-03-16 | End: 2020-03-16 | Stop reason: HOSPADM

## 2020-03-16 RX ORDER — FENTANYL CITRATE 50 UG/ML
INJECTION, SOLUTION INTRAMUSCULAR; INTRAVENOUS AS NEEDED
Status: DISCONTINUED | OUTPATIENT
Start: 2020-03-16 | End: 2020-03-16 | Stop reason: HOSPADM

## 2020-03-16 RX ORDER — SODIUM CHLORIDE 0.9 % (FLUSH) 0.9 %
5-40 SYRINGE (ML) INJECTION EVERY 8 HOURS
Status: DISCONTINUED | OUTPATIENT
Start: 2020-03-16 | End: 2020-03-16 | Stop reason: HOSPADM

## 2020-03-16 RX ORDER — SODIUM CHLORIDE, SODIUM LACTATE, POTASSIUM CHLORIDE, CALCIUM CHLORIDE 600; 310; 30; 20 MG/100ML; MG/100ML; MG/100ML; MG/100ML
INJECTION, SOLUTION INTRAVENOUS
Status: DISCONTINUED | OUTPATIENT
Start: 2020-03-16 | End: 2020-03-16 | Stop reason: HOSPADM

## 2020-03-16 RX ORDER — OXYCODONE AND ACETAMINOPHEN 5; 325 MG/1; MG/1
1 TABLET ORAL
Status: COMPLETED | OUTPATIENT
Start: 2020-03-16 | End: 2020-03-16

## 2020-03-16 RX ORDER — LABETALOL HCL 20 MG/4 ML
5 SYRINGE (ML) INTRAVENOUS AS NEEDED
Status: COMPLETED | OUTPATIENT
Start: 2020-03-16 | End: 2020-03-16

## 2020-03-16 RX ORDER — DOCUSATE SODIUM 100 MG/1
100 CAPSULE, LIQUID FILLED ORAL 2 TIMES DAILY
Qty: 60 CAP | Refills: 2 | Status: SHIPPED | OUTPATIENT
Start: 2020-03-16 | End: 2020-06-14

## 2020-03-16 RX ORDER — CEFAZOLIN SODIUM IN 0.9 % NACL 2 G/100 ML
PLASTIC BAG, INJECTION (ML) INTRAVENOUS AS NEEDED
Status: DISCONTINUED | OUTPATIENT
Start: 2020-03-16 | End: 2020-03-16 | Stop reason: HOSPADM

## 2020-03-16 RX ORDER — TAMSULOSIN HYDROCHLORIDE 0.4 MG/1
0.8 CAPSULE ORAL ONCE
Status: COMPLETED | OUTPATIENT
Start: 2020-03-16 | End: 2020-03-16

## 2020-03-16 RX ORDER — PROPOFOL 10 MG/ML
INJECTION, EMULSION INTRAVENOUS AS NEEDED
Status: DISCONTINUED | OUTPATIENT
Start: 2020-03-16 | End: 2020-03-16 | Stop reason: HOSPADM

## 2020-03-16 RX ORDER — BISACODYL 5 MG
5 TABLET, DELAYED RELEASE (ENTERIC COATED) ORAL DAILY
Qty: 3 TAB | Refills: 0 | Status: SHIPPED | OUTPATIENT
Start: 2020-03-16 | End: 2022-03-09

## 2020-03-16 RX ORDER — CEFAZOLIN SODIUM 2 G/50ML
2 SOLUTION INTRAVENOUS ONCE
Status: DISCONTINUED | OUTPATIENT
Start: 2020-03-16 | End: 2020-03-16 | Stop reason: HOSPADM

## 2020-03-16 RX ORDER — ROCURONIUM BROMIDE 10 MG/ML
INJECTION, SOLUTION INTRAVENOUS AS NEEDED
Status: DISCONTINUED | OUTPATIENT
Start: 2020-03-16 | End: 2020-03-16 | Stop reason: HOSPADM

## 2020-03-16 RX ORDER — LIDOCAINE HYDROCHLORIDE 20 MG/ML
INJECTION, SOLUTION EPIDURAL; INFILTRATION; INTRACAUDAL; PERINEURAL AS NEEDED
Status: DISCONTINUED | OUTPATIENT
Start: 2020-03-16 | End: 2020-03-16

## 2020-03-16 RX ORDER — FENTANYL CITRATE 50 UG/ML
25 INJECTION, SOLUTION INTRAMUSCULAR; INTRAVENOUS AS NEEDED
Status: DISCONTINUED | OUTPATIENT
Start: 2020-03-16 | End: 2020-03-16 | Stop reason: HOSPADM

## 2020-03-16 RX ORDER — OXYCODONE AND ACETAMINOPHEN 5; 325 MG/1; MG/1
1 TABLET ORAL
Qty: 25 TAB | Refills: 0 | Status: SHIPPED | OUTPATIENT
Start: 2020-03-16 | End: 2020-03-19

## 2020-03-16 RX ORDER — HYDRALAZINE HYDROCHLORIDE 20 MG/ML
10 INJECTION INTRAMUSCULAR; INTRAVENOUS AS NEEDED
Status: COMPLETED | OUTPATIENT
Start: 2020-03-16 | End: 2020-03-16

## 2020-03-16 RX ORDER — LABETALOL HCL 20 MG/4 ML
SYRINGE (ML) INTRAVENOUS
Status: COMPLETED
Start: 2020-03-16 | End: 2020-03-16

## 2020-03-16 RX ORDER — FAMOTIDINE 20 MG/1
20 TABLET, FILM COATED ORAL ONCE
Status: COMPLETED | OUTPATIENT
Start: 2020-03-16 | End: 2020-03-16

## 2020-03-16 RX ADMIN — OXYCODONE HYDROCHLORIDE AND ACETAMINOPHEN 1 TABLET: 5; 325 TABLET ORAL at 16:40

## 2020-03-16 RX ADMIN — FENTANYL CITRATE 25 MCG: 50 INJECTION INTRAMUSCULAR; INTRAVENOUS at 13:11

## 2020-03-16 RX ADMIN — HYDRALAZINE HYDROCHLORIDE 5 MG: 20 INJECTION INTRAMUSCULAR; INTRAVENOUS at 11:13

## 2020-03-16 RX ADMIN — TAMSULOSIN HYDROCHLORIDE 0.8 MG: 0.4 CAPSULE ORAL at 13:08

## 2020-03-16 RX ADMIN — Medication 2 G: at 10:38

## 2020-03-16 RX ADMIN — FENTANYL CITRATE 25 MCG: 50 INJECTION INTRAMUSCULAR; INTRAVENOUS at 13:17

## 2020-03-16 RX ADMIN — FENTANYL CITRATE 50 MCG: 50 INJECTION INTRAMUSCULAR; INTRAVENOUS at 11:03

## 2020-03-16 RX ADMIN — HYDRALAZINE HYDROCHLORIDE 10 MG: 20 INJECTION INTRAMUSCULAR; INTRAVENOUS at 09:24

## 2020-03-16 RX ADMIN — MIDAZOLAM HYDROCHLORIDE 2 MG: 2 INJECTION, SOLUTION INTRAMUSCULAR; INTRAVENOUS at 10:27

## 2020-03-16 RX ADMIN — PROPOFOL 200 MG: 10 INJECTION, EMULSION INTRAVENOUS at 10:41

## 2020-03-16 RX ADMIN — LIDOCAINE HYDROCHLORIDE 100 MG: 20 INJECTION, SOLUTION EPIDURAL; INFILTRATION; INTRACAUDAL; PERINEURAL at 10:41

## 2020-03-16 RX ADMIN — LABETALOL 20 MG/4 ML (5 MG/ML) INTRAVENOUS SYRINGE 5 MG: at 13:16

## 2020-03-16 RX ADMIN — SUCCINYLCHOLINE CHLORIDE 100 MG: 20 INJECTION, SOLUTION INTRAMUSCULAR; INTRAVENOUS at 10:41

## 2020-03-16 RX ADMIN — ROCURONIUM BROMIDE 20 MG: 10 INJECTION, SOLUTION INTRAVENOUS at 10:55

## 2020-03-16 RX ADMIN — SODIUM CHLORIDE, SODIUM LACTATE, POTASSIUM CHLORIDE, AND CALCIUM CHLORIDE 75 ML/HR: 600; 310; 30; 20 INJECTION, SOLUTION INTRAVENOUS at 09:06

## 2020-03-16 RX ADMIN — LABETALOL 20 MG/4 ML (5 MG/ML) INTRAVENOUS SYRINGE 5 MG: at 09:20

## 2020-03-16 RX ADMIN — HYDRALAZINE HYDROCHLORIDE 10 MG: 20 INJECTION INTRAMUSCULAR; INTRAVENOUS at 12:30

## 2020-03-16 RX ADMIN — SODIUM CHLORIDE, SODIUM LACTATE, POTASSIUM CHLORIDE, AND CALCIUM CHLORIDE: 600; 310; 30; 20 INJECTION, SOLUTION INTRAVENOUS at 09:30

## 2020-03-16 RX ADMIN — FAMOTIDINE 20 MG: 20 TABLET, FILM COATED ORAL at 09:06

## 2020-03-16 RX ADMIN — FENTANYL CITRATE 50 MCG: 50 INJECTION INTRAMUSCULAR; INTRAVENOUS at 10:41

## 2020-03-16 NOTE — OP NOTES
[place] [id number]  Jordan Zafar                                 OPERATIVE REPORT    PATIENT:    Kaleigh Damon  MRN:            [id number]      DATE:  3/16/2020  BILLIN  ROOM:        @BED@  ATTENDING:   Martín Thompson MD  DICTATING:   Martín Thompson MD    PREOPERATIVE DIAGNOSIS : Right inguinal hernia  POSTOPERATIVE DIAGNOSIS:  indirect right inguinal hernia  PROCEDURES PERFORMED: Robot-assisted laparoscopic right inguinal hernia  repair with mesh. ESTIMATED BLOOD LOSS: 10 mL. FLUIDS GIVEN:  1000 mL. URINE OUTPUT: 100 mL. SPECIMENS REMOVED: None. SURGEON: Lico Jolly MD   ASSISTANT: Marnie Fair SA  ANESTHESIA: General and local (0.25% Marcaine with epinephrine). FINDINGS: right inguinal hernia   OPERATIVE INDICATION: Patient with symptomatic right inguinal hernia  DESCRIPTION OF PROCEDURE: The patient was identified in the holding area, where consent for robot-assisted laparoscopic right inguinal   hernia repair with mesh was verified. In the operating room, the patient   was placed under general anesthesia. Hernandez catheter was inserted. The   abdomen was prepped and draped in sterile fashion using chlorhexidine   solution and sterile drapes. The time-out was performed to ensure correct   procedure. The local anesthetic was infiltrated into the skin and deep   dermal tissues at each proposed trocar site prior to the incision. The   initial incision was placed above the umbilicus. This was a  9 mm incision   to accommodate the 8 mm trocar and scope. The Visiport system was used to   safely enter the peritoneal cavity. Pneumoperitoneum was obtained using   carbon dioxide gas to 15 mmHg. The trocar in the left mid clavicular line   was then placed safely into the peritoneal cavity. This was an 8 mm robotic   trocar.  The second 8 mm trocar was placed slightly cephalad and in the mid   clavicular line on the right. The patient was placed in Trendelenburg. The robot was then docked and the robotic instruments were safely inserted   into place under direct visualization. The peritoneum was then incised   using electrocautery at the right anterior superior iliac spine. This dissection was carried laterally from the ASIS, medially to the midline. The peritoneum was then reflected downward. The hernia sac was bluntly dissected away from the cord and cord structures. The vasculature of the cord was densely adherent to the testicle. The hernia sac was then dissected away from the vasculature. TheProgrip mesh was inserted through the right-sided trocar. Safe entry into the peritoneal cavity was   visualized. The 0- Vicryl and 2 - 0 V-Loc suture were also inserted through this trocar. The SutureCut needle    was then inserted into the right trocar. The mesh was positioned and   then sutured with a stitch in Mihir's ligament and medial in the upper   center of the mesh for fixation. The suture was inserted and the peritoneum was closed in a running fashion. The mesh set nicely against the abdominal wall. The peritoneal   closure was tension free. The 2 needles were removed from the peritoneal cavity. The trocars were removed under direct visualization. Pneumoperitoneum was allowed to deflate. A 4-0 Monocryl suture was used to close the skin at each trocar site. Dermabond was used as a wound sealant. The patient tolerated the procedure very well. DISPOSITION: He was stable with the Hernandez removed, extubated upon transport   to the recovery room.

## 2020-03-16 NOTE — PERIOP NOTES
[id number] DBP-90's, Dr. Chantal Tobin made aware, orders received. Patient denies visual disturbance,headaches, or chest discomfort.

## 2020-03-16 NOTE — DISCHARGE INSTRUCTIONS
[place] [address] [facility]  Radhika Thompson MD, FACS  General Surgery    Pt may remove the dressing and shower in two days. Allow soap and water to run over the incision. No driving or operating heavy machinery while on narcotic pain medications. Please apply an ice pack to the operative site for 30 minutes 3 times daily to help reduce pain and swelling and the need for narcotic pain medication. No strenuous activity or contact sports for two weeks. No lifting greater than 15 lbs for 2 weeks. Call MD for any redness, swelling, bleeding or pus at the incision. Also call for any nausea, vomiting, increased pain or pain uncontrolled by pain medicine. Patient Education        Hernia Repair: What to Expect at [address] [place] [address] are likely to have pain for the next few days. You may also feel like you have the flu, and you may have a low fever and feel tired and nauseated. This is common. You should feel better after a few days and will probably feel much better in 7 days. For several weeks you may feel twinges or pulling in the hernia repair when you move. You may have some bruising on the scrotum and along the penis. This is normal. Men will need to wear a jockstrap or briefs, not boxers, for scrotal support for several days after a groin (inguinal) hernia repair. Da[employer]andadex bicycle shorts may provide good support. This care sheet gives you a general idea about how long it will take for you to recover. But each person recovers at a different pace. Follow the steps below to get better as quickly as possible. How can you care for yourself at home? Activity    · Rest when you feel tired. Getting enough sleep will help you recover.     · Try to walk each day. Start by walking a little more than you did the day before. Bit by bit, increase the amount you walk.  Walking boosts blood flow and helps prevent pneumonia and constipation.     · Avoid strenuous activities, such as biking, jogging, weight lifting, or aerobic exercise, until your doctor says it is okay.     · Avoid lifting anything that would make you strain. This may include heavy grocery bags and milk containers, a heavy briefcase or backpack, cat litter or dog food bags, a vacuum , or a child.     · You may drive when you are no longer taking pain medicine and can quickly move your foot from the gas pedal to the brake. You must also be able to sit comfortably for a long period of time, even if you do not plan to go far. You might get caught in traffic.     · Most people are able to return to work within 1 to 2 weeks after surgery.     · You may shower 24 to 48 hours after surgery, if your doctor okays it. Pat the cut (incision) dry. Do not take a bath for the first 2 weeks, or until your doctor tells you it is okay.     · Your doctor will tell you when you can have sex again. Diet    · You can eat your normal diet. If your stomach is upset, try bland, low-fat foods like plain rice, broiled chicken, toast, and yogurt.     · Drink plenty of fluids (unless your doctor tells you not to).     · You may notice that your bowel movements are not regular right after your surgery. This is common. Avoid constipation and straining with bowel movements. You may want to take a fiber supplement every day. If you have not had a bowel movement after a couple of days, ask your doctor about taking a mild laxative. Medicines    · Your doctor will tell you if and when you can restart your medicines. He or she will also give you instructions about taking any new medicines.     · If you take aspirin or some other blood thinner, ask your doctor if and when to start taking it again. Make sure that you understand exactly what your doctor wants you to do.     · Be safe with medicines. Take pain medicines exactly as directed. ? If the doctor gave you a prescription medicine for pain, take it as prescribed.   ? If you are not taking a prescription pain medicine, take an over-the-counter medicine such as acetaminophen (Tylenol), ibuprofen (Advil, Motrin), or naproxen (Aleve). Read and follow all instructions on the label. ? Do not take two or more pain medicines at the same time unless the doctor told you to. Many pain medicines have acetaminophen, which is Tylenol. Too much acetaminophen (Tylenol) can be harmful.     · If your doctor prescribed antibiotics, take them as directed. Do not stop taking them just because you feel better. You need to take the full course of antibiotics.     · If you think your pain medicine is making you sick to your stomach:  ? Take your medicine after meals (unless your doctor has told you not to). ? Ask your doctor for a different pain medicine. Incision care    · If you have strips of tape on the cut (incision) the doctor made, leave the tape on for a week or until it falls off.     · If you have staples closing the cut, you will need to visit your doctor in 1 to 2 weeks to have them removed.     · Wash the area daily with warm, soapy water and pat it dry. Follow-up care is a key part of your treatment and safety. Be sure to make and go to all appointments, and call your doctor if you are having problems. It's also a good idea to know your test results and keep a list of the medicines you take. When should you call for help? Call 911 anytime you think you may need emergency care. For example, call if:    · You passed out (lost consciousness).     · You are short of breath.    Call your doctor now or seek immediate medical care if:    · You have pain that does not get better after you take pain medicine.     · You are sick to your stomach and cannot keep fluids down.     · You have signs of a blood clot in your leg (called a deep vein thrombosis), such as:  ? Pain in your calf, back of the knee, thigh, or groin. ?  Redness and swelling in your leg or groin.     · You cannot pass stools or gas.     · Bright red blood has soaked through the bandage over your incision.     · You have loose stitches, or your incision comes open.     · You have signs of infection, such as:  ? Increased pain, swelling, warmth, or redness. ? Red streaks leading from the incision. ? Pus draining from the incision. ? A fever.    Watch closely for any changes in your health, and be sure to contact your doctor if you have any problems. Where can you learn more? Go to http://carolyn-nicole.info/  Enter E012 in the search box to learn more about \"Hernia Repair: What to Expect at Home. \"  Current as of: August 11, 2019Content Version: 12.4  © 2743-1247 [employer]. Care instructions adapted under license by [employer] (which disclaims liability or warranty for this information). If you have questions about a medical condition or this instruction, always ask your healthcare professional. [place] [address] any warranty or liability for your use of this information. Patient Education        High-Fiber Diet: Care Instructions  Your Care Instructions    A high-fiber diet may help you relieve constipation and feel less bloated. Your doctor and dietitian will help you make a high-fiber eating plan based on your personal needs. The plan will include the things you like to eat. It will also make sure that you get 30 grams of fiber a day. Before you make changes to the way you eat, be sure to talk with your doctor or dietitian. Follow-up care is a key part of your treatment and safety. Be sure to make and go to all appointments, and call your doctor if you are having problems. It's also a good idea to know your test results and keep a list of the medicines you take. How can you care for yourself at home? · You can increase how much fiber you get if you eat more of certain foods. These foods include:  ? Whole-grain breads and cereals. ? Fruits, such as pears, apples, and peaches.  Eat the skins, peels, and seeds, if you can.  ? Vegetables, such as broccoli, cabbage, spinach, carrots, asparagus, and squash. ? Starchy vegetables. These include potatoes with skins, kidney beans, and lima beans. · Take a fiber supplement every day if your doctor recommends it. Examples are Benefiber, Citrucel, FiberCon, and Metamucil. Ask your doctor how much to take. · Drink plenty of fluids, enough so that your urine is light yellow or clear like water. If you have kidney, heart, or liver disease and have to limit fluids, talk with your doctor before you increase the amount of fluids you drink. · Get some exercise every day. Exercise helps stool move through the colon. It also helps prevent constipation. · Keep a food diary. Try to notice and write down what foods cause gas, pain, or other symptoms. Then you can avoid these foods. Where can you learn more? Go to http://carolyn-nicole.info/  Enter F873 in the search box to learn more about \"High-Fiber Diet: Care Instructions. \"  Current as of: August 21, 2019Content Version: 12.4  © 8991-0212 [employer], [employer]. Care instructions adapted under license by [employer] (which disclaims liability or warranty for this information). If you have questions about a medical condition or this instruction, always ask your healthcare professional. [place] [address] any warranty or liability for your use of this information. DISCHARGE SUMMARY from Nurse    PATIENT INSTRUCTIONS:    After general anesthesia or intravenous sedation, for 24 hours or while taking prescription Narcotics:  · Limit your activities  · Do not drive and operate hazardous machinery  · Do not make important personal or business decisions  · Do  not drink alcoholic beverages  · If you have not urinated within 8 hours after discharge, please contact your surgeon on call.     Report the following to your surgeon:  · Excessive pain, swelling, redness or odor of or around the surgical area  · Temperature over 100.5  · Nausea and vomiting lasting longer than 4 hours or if unable to take medications  · Any signs of decreased circulation or nerve impairment to extremity: change in color, persistent  numbness, tingling, coldness or increase pain  · Any questions    What to do at Home:  Recommended activity: Activity as tolerated and no driving for today. *  Please give a list of your current medications to your Primary Care Provider. *  Please update this list whenever your medications are discontinued, doses are      changed, or new medications (including over-the-counter products) are added. *  Please carry medication information at all times in case of emergency situations. These are general instructions for a healthy lifestyle:    No smoking/ No tobacco products/ Avoid exposure to second hand smoke  Surgeon General's Warning:  Quitting smoking now greatly reduces serious risk to your health. Obesity, smoking, and sedentary lifestyle greatly increases your risk for illness    A healthy diet, regular physical exercise & weight monitoring are important for maintaining a healthy lifestyle    You may be retaining fluid if you have a history of heart failure or if you experience any of the following symptoms:  Weight gain of 3 pounds or more overnight or 5 pounds in a week, increased swelling in our hands or feet or shortness of breath while lying flat in bed. Please call your doctor as soon as you notice any of these symptoms; do not wait until your next office visit. The discharge information has been reviewed with the patient and spouse. The patient and spouse verbalized understanding.   Discharge medications reviewed with the patient and spouse and appropriate educational materials and side effects teaching were provided.   ___________________________________________________________________________________________________________________________________

## 2020-03-16 NOTE — ANESTHESIA PREPROCEDURE EVALUATION
Relevant Problems   No relevant active problems       Anesthetic History   No history of anesthetic complications            Review of Systems / Medical History  Patient summary reviewed and pertinent labs reviewed    Pulmonary  Within defined limits                 Neuro/Psych   Within defined limits           Cardiovascular    Hypertension                   GI/Hepatic/Renal         Renal disease: CRI       Endo/Other        Arthritis     Other Findings              Physical Exam    Airway  Mallampati: II  TM Distance: 4 - 6 cm  Neck ROM: normal range of motion   Mouth opening: Normal     Cardiovascular    Rhythm: regular  Rate: normal         Dental    Dentition: Poor dentition and Full upper dentures     Pulmonary  Breath sounds clear to auscultation               Abdominal  GI exam deferred       Other Findings            Anesthetic Plan    ASA: 3  Anesthesia type: general          Induction: Intravenous  Anesthetic plan and risks discussed with: Patient

## 2020-03-16 NOTE — INTERVAL H&P NOTE
H&P Update:  Drinda Dance was seen and examined. History and physical has been reviewed. The patient has been examined.  There have been no significant clinical changes since the completion of the originally dated History and Physical.

## 2020-03-16 NOTE — ANESTHESIA POSTPROCEDURE EVALUATION
Procedure(s):  ROBOTIC ASSISTED LAPAROSCOPIC RIGHT INGUINAL HERNIA REPAIR WITH BARD 3D MAX MEDIUM RIGHT MESH. general    Anesthesia Post Evaluation      Multimodal analgesia: multimodal analgesia used between 6 hours prior to anesthesia start to PACU discharge  Patient location during evaluation: bedside  Patient participation: complete - patient participated  Level of consciousness: awake  Pain management: adequate  Airway patency: patent  Anesthetic complications: no  Cardiovascular status: stable  Respiratory status: acceptable  Hydration status: acceptable  Post anesthesia nausea and vomiting:  controlled      Vitals Value Taken Time   /76 3/16/2020  1:35 PM   Temp 36.2 °C (97.2 °F) 3/16/2020 12:13 PM   Pulse 58 3/16/2020  1:40 PM   Resp 9 3/16/2020  1:40 PM   SpO2 95 % 3/16/2020  1:40 PM   Vitals shown include unvalidated device data.

## 2020-03-16 NOTE — DISCHARGE SUMMARY
4 Marissa Fuentes MD, [facility]  General Surgery  Discharge Summary     Patient ID:  Tatiana Toure  [id number]  19 y.o.  1940    Admit Date: 3/16/2020    Discharge Date: 3/16/2020    Admission Diagnoses: Right inguinal hernia [K40.90]; Inguinal hernia of right side without obstruction or gangrene [K40.90]    Discharge Diagnoses:    Problem List as of 3/16/2020 Date Reviewed: 3/16/2020          Codes Class Noted - Resolved    Inguinal hernia of right side without obstruction or gangrene ICD-10-CM: K40.90  ICD-9-CM: 550.90  3/16/2020 - Present        Special screening for malignant neoplasms, colon ICD-10-CM: Z12.11  ICD-9-CM: V76.51  8/27/2014 - Present               Admission Condition: Good    Discharge Condition: Good    Last Procedure: Procedure(s):  ROBOTIC ASSISTED LAPAROSCOPIC RIGHT INGUINAL HERNIA REPAIR WITH BARD 3D MAX MEDIUM RIGHT [address] [facility] [facility] [address] Course:   Normal hospital course for this procedure. Consults: None    Significant Diagnostic Studies: None    Disposition: home    Patient Instructions:   Current Discharge Medication List      START taking these medications    Details   oxyCODONE-acetaminophen (Percocet) 5-325 mg per tablet Take 1 Tab by mouth every six (6) hours as needed for Pain for up to 7 days. Max Daily Amount: 4 Tabs. Indications: pain  Qty: 25 Tab, Refills: 0    Associated Diagnoses: Postoperative pain      docusate sodium (COLACE) 100 mg capsule Take 1 Cap by mouth two (2) times a day for 90 days. Qty: 60 Cap, Refills: 2      bisacodyL (DULCOLAX) 5 mg EC tablet Take 1 Tab by mouth daily. Qty: 3 Tab, Refills: 0         CONTINUE these medications which have NOT CHANGED    Details   allopurinoL (ZYLOPRIM) 100 mg tablet Take  by mouth daily. amLODIPine (NORVASC) 10 mg tablet Take  by mouth daily. atorvastatin (LIPITOR) 40 mg tablet Take  by mouth daily.       carvediloL (COREG) 25 mg tablet Take 25 mg by mouth two (2) times daily (with meals). doxazosin (CARDURA) 4 mg tablet Take  by mouth daily. furosemide (LASIX) 20 mg tablet Take  by mouth daily. hydrALAZINE (APRESOLINE) 50 mg tablet Take 25 mg by mouth three (3) times daily. pramoxine-hydrocortisone (PROTOFOAM-HC) 2.5-1 % topical cream Apply  to affected area three (3) times daily. losartan (COZAAR) 100 mg tablet Take 100 mg by mouth daily. predniSONE (DELTASONE) 20 mg tablet Take  by mouth daily (with breakfast). triamcinolone acetonide (KENALOG) 0.1 % topical cream Apply  to affected area two (2) times a day. use thin layer      acetaminophen (TYLENOL) 325 mg tablet Take 2 Tabs by mouth every six (6) hours as needed for Pain. Qty: 20 Tab, Refills: 0      losartan-hydrochlorothiazide (HYZAAR) 100-25 mg per tablet Take 1 tablet by mouth daily. Indications: HYPERTENSION      pravastatin (PRAVACHOL) 40 mg tablet Take 40 mg by mouth nightly. Indications: HYPERCHOLESTEROLEMIA      aspirin 81 mg chewable tablet Take 81 mg by mouth daily. fluticasone propionate (FLONASE) 50 mcg/actuation nasal spray 2 Sprays by Both Nostrils route daily. loratadine (CLARITIN) 10 mg tablet Take 10 mg by mouth. Activity: See surgical instructions  Diet: Low fat, Low cholesterol  Wound Care: As directed    Follow-up with Dr. Aurea Meier in 2 weeks.   Follow-up tests/labs None    Signed:  Sydnie Newton MD  3/16/2020  11:59 AM

## 2020-03-19 ENCOUNTER — HOSPITAL ENCOUNTER (EMERGENCY)
Age: 80
Discharge: HOME | End: 2020-03-19
Attending: EMERGENCY MEDICINE
Payer: MEDICARE

## 2020-03-19 VITALS
DIASTOLIC BLOOD PRESSURE: 79 MMHG | WEIGHT: 156 LBS | HEIGHT: 67 IN | TEMPERATURE: 99 F | BODY MASS INDEX: 24.48 KG/M2 | SYSTOLIC BLOOD PRESSURE: 185 MMHG | RESPIRATION RATE: 16 BRPM | HEART RATE: 75 BPM

## 2020-03-19 DIAGNOSIS — Z48.89 ENCOUNTER FOR POST SURGICAL WOUND CHECK: Primary | ICD-10-CM

## 2020-03-19 PROCEDURE — 99282 EMERGENCY DEPT VISIT SF MDM: CPT

## 2020-03-19 RX ORDER — HYDROCODONE BITARTRATE AND ACETAMINOPHEN 5; 325 MG/1; MG/1
1 TABLET ORAL
Qty: 30 TAB | Refills: 0 | Status: SHIPPED | OUTPATIENT
Start: 2020-03-19 | End: 2020-03-26

## 2020-03-19 NOTE — ED PROVIDER NOTES
HPI patient is a 70-year-old male who had a right inguinal hernia repair 1 week ago. Over the past 24 hoursthe  patient noticedthat his penis and testicle has been swollen. He denies having testicle or abdominal pain. Past Medical History:   Diagnosis Date    Arthritis     Chronic constipation     High cholesterol     Hypertension        Past Surgical History:   Procedure Laterality Date    HX HEENT           No family history on file. Social History     Socioeconomic History    Marital status:      Spouse name: Not on file    Number of children: Not on file    Years of education: Not on file    Highest education level: Not on file   Occupational History    Not on file   Social Needs    Financial resource strain: Not on file    Food insecurity     Worry: Not on file     Inability: Not on file    Transportation needs     Medical: Not on file     Non-medical: Not on file   Tobacco Use    Smoking status: Former Smoker    Smokeless tobacco: Former User     Quit date: 3/11/1995   Substance and Sexual Activity    Alcohol use: No    Drug use: No    Sexual activity: Not on file   Lifestyle    Physical activity     Days per week: Not on file     Minutes per session: Not on file    Stress: Not on file   Relationships    Social connections     Talks on phone: Not on file     Gets together: Not on file     Attends Mandaen service: Not on file     Active member of club or organization: Not on file     Attends meetings of clubs or organizations: Not on file     Relationship status: Not on file    Intimate partner violence     Fear of current or ex partner: Not on file     Emotionally abused: Not on file     Physically abused: Not on file     Forced sexual activity: Not on file   Other Topics Concern    Not on file   Social History Narrative    Not on file         ALLERGIES: Patient has no known allergies. Review of Systems   Constitutional: Negative. HENT: Negative. Eyes: Negative. Respiratory: Negative. Cardiovascular: Negative. Gastrointestinal: Negative. Endocrine: Negative. Genitourinary: Negative. Musculoskeletal: Negative. Skin: Negative. Allergic/Immunologic: Negative. Neurological: Negative. Hematological: Negative. Psychiatric/Behavioral: Negative. All other systems reviewed and are negative. Vitals:    03/19/20 0457   BP: 185/79   Pulse: 75   Resp: 16   Temp: 99 °F (37.2 °C)   Weight: 70.8 kg (156 lb)   Height: 5' 7\" (1.702 m)            Physical Exam  Vitals signs and nursing note reviewed. Constitutional:       General: He is not in acute distress. Appearance: He is well-developed. HENT:      Head: Normocephalic. Eyes:      Conjunctiva/sclera: Conjunctivae normal.      Pupils: Pupils are equal, round, and reactive to light. Neck:      Musculoskeletal: Normal range of motion and neck supple. Cardiovascular:      Rate and Rhythm: Normal rate and regular rhythm. Heart sounds: Normal heart sounds. No murmur. Pulmonary:      Effort: Pulmonary effort is normal. No respiratory distress. Breath sounds: Normal breath sounds. No wheezing or rales. Chest:      Chest wall: No tenderness. Abdominal:      General: Bowel sounds are normal. There is no distension. Palpations: Abdomen is soft. Tenderness: There is no abdominal tenderness. There is no rebound. Genitourinary:     Penis: Normal.       Comments: Testes: No tenderness with palpation, mild edema, no ecchymosis, no erythema  Musculoskeletal: Normal range of motion. General: No tenderness. Skin:     General: Skin is warm and dry. Findings: No rash. Neurological:      Mental Status: He is alert and oriented to person, place, and time. Cranial Nerves: No cranial nerve deficit. Motor: No abnormal muscle tone. Coordination: Coordination normal.   Psychiatric:         Behavior: Behavior normal.         Thought Content:  Thought content normal.         Judgment: Judgment normal.          MDM       Procedures      Dx: post op wound check    Disp: continue current medications. F/U Dr. Camryn Osborne withiin 2 to 3 days. Return to ER prn. .  Dictation disclaimer:  Please note that this dictation was completed with Nexi, the computer voice recognition software. Quite often unanticipated grammatical, syntax, homophones, and other interpretive errors are inadvertently transcribed by the computer software. Please disregard these errors. Please excuse any errors that have escaped final proofreading.

## 2020-03-19 NOTE — ED TRIAGE NOTES
Patient presents to for C/O swelling/pain to groin and penis. C/O constipation, states no Bowel movement since surgery.

## 2020-03-19 NOTE — ED NOTES
I have reviewed discharge instructions with the patient. The patient verbalized understanding. Patient Discharged in stable condition. Patient verbalized need to follow up with surgeon, Dr Yasmin Graves.

## 2020-03-24 ENCOUNTER — TELEPHONE (OUTPATIENT)
Dept: SURGERY | Age: 80
End: 2020-03-24

## 2020-03-24 RX ORDER — IBUPROFEN 800 MG/1
800 TABLET ORAL
Qty: 40 TAB | Refills: 0 | Status: SHIPPED | OUTPATIENT
Start: 2020-03-24 | End: 2020-05-08

## 2020-03-24 NOTE — TELEPHONE ENCOUNTER
Patient called to see if he could continue to take Motrin 600 mg, for surgical pain. States pain is a 10, but Motrin was working. Dr. Ankita Cleary is going to call this in to his Pharmacy. He understood.  ISSAC,LPN, Cn, BC

## 2020-04-01 ENCOUNTER — VIRTUAL VISIT (OUTPATIENT)
Dept: SURGERY | Age: 80
End: 2020-04-01

## 2020-04-01 DIAGNOSIS — Z09 POSTOPERATIVE EXAMINATION: Primary | ICD-10-CM

## 2020-04-01 RX ORDER — BISMUTH SUBSALICYLATE 262 MG
1 TABLET,CHEWABLE ORAL DAILY
COMMUNITY
End: 2022-03-09

## 2020-04-01 RX ORDER — LANOLIN ALCOHOL/MO/W.PET/CERES
1000 CREAM (GRAM) TOPICAL DAILY
COMMUNITY
End: 2022-03-09

## 2020-04-01 RX ORDER — MELATONIN
DAILY
COMMUNITY
End: 2022-03-09

## 2020-04-01 NOTE — PROGRESS NOTES
[facility]  General Surgery    Name: Sivan Ontiveros MRN: [id number]   : 1940 Location: [facility]   Date: 2020         V[facility]al Visit  Informed consent was obtained for the telehealth visit via Doxy. me. The patient understands that the charges may be billed for this visit if care is given outside of the global period. Visit is performed with the patient at home. I am in my office. Start time:1538 hrs  End time:1548 hrs  Subjective:  Pt states that he is healing well. He has some bruising and a scab on the incision. He is using ice packs. + Vacuum. Objective:    Physical Exam:    General: A&A, NAD, Ox4    Current Medications:  Current Outpatient Medications   Medication Sig Dispense Refill    cholecalciferol (Vitamin D3) (1000 Units /25 mcg) tablet Take  by mouth daily.  cyanocobalamin 1,000 mcg tablet Take 1,000 mcg by mouth daily.  multivitamin (ONE A DAY) tablet Take 1 Tab by mouth daily.  ibuprofen (MOTRIN) 800 mg tablet Take 1 Tab by mouth three (3) times daily as needed for Pain. 40 Tab 0    docusate sodium (COLACE) 100 mg capsule Take 1 Cap by mouth two (2) times a day for 90 days. 60 Cap 2    allopurinoL (ZYLOPRIM) 100 mg tablet Take  by mouth daily.  amLODIPine (NORVASC) 10 mg tablet Take  by mouth daily.  atorvastatin (LIPITOR) 40 mg tablet Take  by mouth daily.  carvediloL (COREG) 25 mg tablet Take 25 mg by mouth two (2) times daily (with meals).  doxazosin (CARDURA) 4 mg tablet Take  by mouth daily.  furosemide (LASIX) 20 mg tablet Take  by mouth daily.  hydrALAZINE (APRESOLINE) 50 mg tablet Take 25 mg by mouth three (3) times daily.  losartan (COZAAR) 100 mg tablet Take 100 mg by mouth daily.  triamcinolone acetonide (KENALOG) 0.1 % topical cream Apply  to affected area two (2) times a day.  use thin layer      acetaminophen (TYLENOL) 325 mg tablet Take 2 Tabs by mouth every six (6) hours as needed for Pain. 20 Tab 0    pravastatin (PRAVACHOL) 40 mg tablet Take 40 mg by mouth nightly. Indications: HYPERCHOLESTEROLEMIA      aspirin 81 mg chewable tablet Take 81 mg by mouth daily.  bisacodyL (DULCOLAX) 5 mg EC tablet Take 1 Tab by mouth daily. 3 Tab 0    fluticasone propionate (FLONASE) 50 mcg/actuation nasal spray 2 Sprays by Both Nostrils route daily.  pramoxine-hydrocortisone (PROTOFOAM-HC) 2.5-1 % topical cream Apply  to affected area three (3) times daily.  loratadine (CLARITIN) 10 mg tablet Take 10 mg by mouth.  predniSONE (DELTASONE) 20 mg tablet Take  by mouth daily (with breakfast).  losartan-hydrochlorothiazide (HYZAAR) 100-25 mg per tablet Take 1 tablet by mouth daily. Indications: HYPERTENSION         Chart and notes reviewed. Data reviewed. I have evaluated and examined the patient. IMPRESSION:   · Patient is doing well 2 weeks out from right inguinal hernia repair with mesh. PLAN:/DISCUSION:   · Continue lifting restrictions.   · 4-week follow-up virtual visit        Briseida Monzon MD

## 2020-05-05 ENCOUNTER — OFFICE VISIT (OUTPATIENT)
Dept: SURGERY | Age: 80
End: 2020-05-05

## 2020-05-05 VITALS
BODY MASS INDEX: 23.54 KG/M2 | WEIGHT: 150 LBS | DIASTOLIC BLOOD PRESSURE: 84 MMHG | OXYGEN SATURATION: 97 % | TEMPERATURE: 98.3 F | RESPIRATION RATE: 18 BRPM | HEIGHT: 67 IN | HEART RATE: 63 BPM | SYSTOLIC BLOOD PRESSURE: 188 MMHG

## 2020-05-05 DIAGNOSIS — Z09 POSTOPERATIVE EXAMINATION: Primary | ICD-10-CM

## 2020-05-05 RX ORDER — CHOLECALCIFEROL (VITAMIN D3) 50 MCG
CAPSULE ORAL
COMMUNITY
End: 2022-03-09

## 2020-05-05 NOTE — PROGRESS NOTES
Chief Complaint   Patient presents with    Surgical Follow-up     pt had right inguinal hernia repair on 3/16/20. Mr. Natalie Cerda has been given the following recommendations today due to his elevated BP reading: referred to Alternative/PCP. Pt stated he has an appointment with PCP 5/11/2020 to follow up for gout and discuss blood pressure.

## 2020-05-05 NOTE — LETTER
NOTIFICATION OF RETURN TO WORK / SCHOOL 
 
5/5/2020 3:23 PM 
 
Mr. Gia Queen [address] [facility] [id number] [address] [address] Javed Sarmiento To Whom It May Concern: 
 
Gia Queen was under the care of [address] from March 16, 2020 to May 31, 2020. He will be able to return to work on June 1, 2020 with no restrictions. If there are questions or concerns please have the patient contact our office. Sincerely, Kiya Maniclla MD

## 2020-05-05 NOTE — PROGRESS NOTES
[facility]  General Surgery    Name: Steve Huffman MRN: [id number]   : 1940 [facility]   Date: 2020 Admission Date: No admission date for patient encounter. Subjective:  Patient presents today without complaints  Objective:  Vitals:    20 1459   BP: 170/86   Pulse: 63   Resp: 18   Temp: 98.3 °F (36.8 °C)   TempSrc: Oral   SpO2: 97%   Weight: 68 kg (150 lb)   Height: 5' 7\" (1.702 m)       Physical Exam:    General: Awake and alert, oriented x4, no apparent distress   Abdomen: abdomen is soft with minimal right lower quadrant and groin tenderness. Incision(s) are C/D/I. No evidence of hernia recurrence. No masses, organomegaly or guarding    Current Medications:  Current Outpatient Medications   Medication Sig Dispense Refill    omega 3-dha-epa-fish oil (Fish Oil) 100-160-1,000 mg cap Take  by mouth.  cholecalciferol (Vitamin D3) (1000 Units /25 mcg) tablet Take  by mouth daily.  cyanocobalamin 1,000 mcg tablet Take 1,000 mcg by mouth daily.  multivitamin (ONE A DAY) tablet Take 1 Tab by mouth daily.  ibuprofen (MOTRIN) 800 mg tablet Take 1 Tab by mouth three (3) times daily as needed for Pain. 40 Tab 0    docusate sodium (COLACE) 100 mg capsule Take 1 Cap by mouth two (2) times a day for 90 days. 60 Cap 2    bisacodyL (DULCOLAX) 5 mg EC tablet Take 1 Tab by mouth daily. 3 Tab 0    allopurinoL (ZYLOPRIM) 100 mg tablet Take  by mouth daily.  amLODIPine (NORVASC) 10 mg tablet Take  by mouth daily.  atorvastatin (LIPITOR) 40 mg tablet Take  by mouth daily.  carvediloL (COREG) 25 mg tablet Take 25 mg by mouth two (2) times daily (with meals).  doxazosin (CARDURA) 4 mg tablet Take  by mouth daily.  furosemide (LASIX) 20 mg tablet Take  by mouth daily.  hydrALAZINE (APRESOLINE) 50 mg tablet Take 25 mg by mouth three (3) times daily.       pramoxine-hydrocortisone (PROTOFOAM-HC) 2.5-1 % topical cream Apply  to affected area three (3) times daily.  losartan (COZAAR) 100 mg tablet Take 100 mg by mouth daily.  predniSONE (DELTASONE) 20 mg tablet Take  by mouth daily (with breakfast).  triamcinolone acetonide (KENALOG) 0.1 % topical cream Apply  to affected area two (2) times a day. use thin layer      acetaminophen (TYLENOL) 325 mg tablet Take 2 Tabs by mouth every six (6) hours as needed for Pain. 20 Tab 0    losartan-hydrochlorothiazide (HYZAAR) 100-25 mg per tablet Take 1 tablet by mouth daily. Indications: HYPERTENSION      pravastatin (PRAVACHOL) 40 mg tablet Take 40 mg by mouth nightly. Indications: HYPERCHOLESTEROLEMIA      aspirin 81 mg chewable tablet Take 81 mg by mouth daily.  fluticasone propionate (FLONASE) 50 mcg/actuation nasal spray 2 Sprays by Both Nostrils route daily.  loratadine (CLARITIN) 10 mg tablet Take 10 mg by mouth. Chart and notes reviewed. Data reviewed. I have evaluated and examined the patient. IMPRESSION:   · Patient healing well 6 weeks out from robot-assisted laparoscopic right inguinal hernia repair with mesh. PLAN:/DISCUSION:   · I recommended the patient return to work without restrictions in 1 month to give him more time to heal as he is nearly [de-identified]years old and does a physical job.   · Follow-up PRN Maryruth Dakin, MD

## 2020-10-23 ENCOUNTER — TRANSCRIBE ORDER (OUTPATIENT)
Dept: SCHEDULING | Age: 80
End: 2020-10-23

## 2020-10-23 DIAGNOSIS — I20.9 CARDIAC ANGINA (HCC): Primary | ICD-10-CM

## 2020-10-28 ENCOUNTER — TELEPHONE (OUTPATIENT)
Dept: CARDIOLOGY CLINIC | Age: 80
End: 2020-10-28

## 2021-02-17 ENCOUNTER — APPOINTMENT (OUTPATIENT)
Dept: CT IMAGING | Age: 81
End: 2021-02-17
Attending: EMERGENCY MEDICINE
Payer: MEDICARE

## 2021-02-17 ENCOUNTER — HOSPITAL ENCOUNTER (EMERGENCY)
Age: 81
Discharge: HOME | End: 2021-02-17
Attending: EMERGENCY MEDICINE
Payer: MEDICARE

## 2021-02-17 VITALS
OXYGEN SATURATION: 99 % | SYSTOLIC BLOOD PRESSURE: 149 MMHG | BODY MASS INDEX: 22.71 KG/M2 | DIASTOLIC BLOOD PRESSURE: 64 MMHG | WEIGHT: 145 LBS | HEART RATE: 58 BPM | RESPIRATION RATE: 18 BRPM | TEMPERATURE: 98.3 F

## 2021-02-17 DIAGNOSIS — G89.29 CHRONIC HIP PAIN, BILATERAL: Primary | ICD-10-CM

## 2021-02-17 DIAGNOSIS — M25.552 CHRONIC HIP PAIN, BILATERAL: Primary | ICD-10-CM

## 2021-02-17 DIAGNOSIS — M25.551 CHRONIC HIP PAIN, BILATERAL: Primary | ICD-10-CM

## 2021-02-17 LAB
ANION GAP SERPL CALC-SCNC: 8 MMOL/L (ref 3–18)
BASOPHILS # BLD: 0 K/UL (ref 0–0.1)
BASOPHILS NFR BLD: 0 % (ref 0–2)
BUN SERPL-MCNC: 60 MG/DL (ref 7–18)
BUN/CREAT SERPL: 17 (ref 12–20)
CALCIUM SERPL-MCNC: 9.5 MG/DL (ref 8.5–10.1)
CHLORIDE SERPL-SCNC: 104 MMOL/L (ref 100–111)
CO2 SERPL-SCNC: 29 MMOL/L (ref 21–32)
CREAT SERPL-MCNC: 3.49 MG/DL (ref 0.6–1.3)
DIFFERENTIAL METHOD BLD: ABNORMAL
EOSINOPHIL # BLD: 0.1 K/UL (ref 0–0.4)
EOSINOPHIL NFR BLD: 1 % (ref 0–5)
ERYTHROCYTE [DISTWIDTH] IN BLOOD BY AUTOMATED COUNT: 18.9 % (ref 11.6–14.5)
GLUCOSE BLD STRIP.AUTO-MCNC: 102 MG/DL (ref 70–110)
GLUCOSE SERPL-MCNC: 102 MG/DL (ref 74–99)
HCT VFR BLD AUTO: 29.1 % (ref 36–48)
HGB BLD-MCNC: 9.2 G/DL (ref 13–16)
LYMPHOCYTES # BLD: 1.7 K/UL (ref 0.9–3.6)
LYMPHOCYTES NFR BLD: 21 % (ref 21–52)
MCH RBC QN AUTO: 26.1 PG (ref 24–34)
MCHC RBC AUTO-ENTMCNC: 31.6 G/DL (ref 31–37)
MCV RBC AUTO: 82.7 FL (ref 74–97)
MONOCYTES # BLD: 1 K/UL (ref 0.05–1.2)
MONOCYTES NFR BLD: 13 % (ref 3–10)
NEUTS SEG # BLD: 5.2 K/UL (ref 1.8–8)
NEUTS SEG NFR BLD: 65 % (ref 40–73)
PLATELET # BLD AUTO: 195 K/UL (ref 135–420)
PMV BLD AUTO: 9.9 FL (ref 9.2–11.8)
POTASSIUM SERPL-SCNC: 4.4 MMOL/L (ref 3.5–5.5)
RBC # BLD AUTO: 3.52 M/UL (ref 4.7–5.5)
SODIUM SERPL-SCNC: 141 MMOL/L (ref 136–145)
WBC # BLD AUTO: 8.1 K/UL (ref 4.6–13.2)

## 2021-02-17 PROCEDURE — 73700 CT LOWER EXTREMITY W/O DYE: CPT

## 2021-02-17 PROCEDURE — 80048 BASIC METABOLIC PNL TOTAL CA: CPT

## 2021-02-17 PROCEDURE — 85025 COMPLETE CBC W/AUTO DIFF WBC: CPT

## 2021-02-17 PROCEDURE — 82962 GLUCOSE BLOOD TEST: CPT

## 2021-02-17 PROCEDURE — 99285 EMERGENCY DEPT VISIT HI MDM: CPT

## 2021-02-17 PROCEDURE — 70450 CT HEAD/BRAIN W/O DYE: CPT

## 2021-02-17 RX ORDER — OXYCODONE AND ACETAMINOPHEN 5; 325 MG/1; MG/1
1 TABLET ORAL
Qty: 12 TAB | Refills: 0 | Status: SHIPPED | OUTPATIENT
Start: 2021-02-17 | End: 2021-02-20

## 2021-02-17 NOTE — ED TRIAGE NOTES
Patient was told his speech sounded slurred yesterday. Also c/o bilateral leg and low back pain x \"months\".

## 2021-02-17 NOTE — LETTER
[facility] EMERGENCY DEPT 
[address] [facility] [id number] [phone] Work/School Note Date: 2/17/2021 To Whom It May concern: 
 
Sivan Ontiveros was seen and treated today in the emergency room by the following provider(s): 
Attending Provider: Sendy Castro MD.   
 
Sivan Ontiveros may return to work on 2/22/2021.  
 
Sincerely, 
 
 
 
 
Joseph Combs MD

## 2021-02-17 NOTE — ED PROVIDER NOTES
HPI patient complains of bilateral hip pain and soreness for several months. He says both his hip hurt when he stands or walks. He says the hips feel better in the pain resolves when he lays down and is nonweightbearing. Patient says he fell and hit his right hip several days ago and that made the right hip started to hurt worse than usual.  He says has been told he has a history of arthritis but has not been evaluated for this hip pain in several years. Additionally, patient says that yesterday his wife told him that he was \"talking funny\". Patient says he felt he was talking normally. He says that today he feels his speech is normal and his wife agrees. He denies any headache or neck pain. He denies a mental status changes. Patient is a vague historian and gives no further specifics. Past Medical History:   Diagnosis Date    Arthritis     Chronic constipation     Gout     High cholesterol     Hypertension        Past Surgical History:   Procedure Laterality Date    HX HEENT      tonsillectomy cyst removed from under tongue    HX HERNIA REPAIR      Robot-assisted laparoscopic right inguinal hernia         History reviewed. No pertinent family history.     Social History     Socioeconomic History    Marital status:      Spouse name: Not on file    Number of children: Not on file    Years of education: Not on file    Highest education level: Not on file   Occupational History    Not on file   Social Needs    Financial resource strain: Not on file    Food insecurity     Worry: Not on file     Inability: Not on file    Transportation needs     Medical: Not on file     Non-medical: Not on file   Tobacco Use    Smoking status: Former Smoker    Smokeless tobacco: Former User     Quit date: 3/11/1995   Substance and Sexual Activity    Alcohol use: No    Drug use: No    Sexual activity: Not on file   Lifestyle    Physical activity     Days per week: Not on file     Minutes per session: Not on file    Stress: Not on file   Relationships    Social connections     Talks on phone: Not on file     Gets together: Not on file     Attends Jew service: Not on file     Active member of club or organization: Not on file     Attends meetings of clubs or organizations: Not on file     Relationship status: Not on file    Intimate partner violence     Fear of current or ex partner: Not on file     Emotionally abused: Not on file     Physically abused: Not on file     Forced sexual activity: Not on file   Other Topics Concern    Not on file   Social History Narrative    Not on file         ALLERGIES: Patient has no known allergies. Review of Systems   Constitutional: Negative. HENT: Negative. Eyes: Negative. Respiratory: Negative. Cardiovascular: Negative. Gastrointestinal: Negative. Genitourinary: Negative. Musculoskeletal: Positive for arthralgias. Skin: Negative. Hematological: Negative. Psychiatric/Behavioral: Negative. Vitals:    02/17/21 1212 02/17/21 1214   BP: (!) 157/84 (!) 157/84   Pulse:  61   Resp:  13   Temp:  98.3 °F (36.8 °C)   SpO2:  99%   Weight:  65.8 kg (145 lb)            Physical Exam  Vitals signs and nursing note reviewed. Constitutional:       Appearance: He is well-developed. HENT:      Head: Normocephalic and atraumatic. Nose: Nose normal.      Mouth/Throat:      Mouth: Mucous membranes are moist.   Eyes:      Pupils: Pupils are equal, round, and reactive to light. Neck:      Musculoskeletal: Neck supple. Cardiovascular:      Rate and Rhythm: Normal rate and regular rhythm. Pulmonary:      Effort: Pulmonary effort is normal.      Breath sounds: Normal breath sounds. Abdominal:      Palpations: Abdomen is soft. Musculoskeletal: Normal range of motion. Skin:     General: Skin is warm and dry. Neurological:      General: No focal deficit present. Mental Status: He is alert and oriented to person, place, and time. Cranial Nerves: No cranial nerve deficit. Psychiatric:         Mood and Affect: Mood normal.          MDM  Number of Diagnoses or Management Options  Diagnosis management comments: I reviewed the patient's care and work-up with his primary care physician (Dr Claudeen Starr). His PCP agrees with an outpatient disposition at this time and he will follow up with the patient in the office within the next several days. Patient understands and agrees with this plan.   Stanley Lassiter MD 3:00 PM         Procedures

## 2021-02-22 ENCOUNTER — APPOINTMENT (OUTPATIENT)
Age: 81
End: 2021-02-22
Attending: EMERGENCY MEDICINE
Payer: MEDICARE

## 2021-02-22 ENCOUNTER — HOSPITAL ENCOUNTER (EMERGENCY)
Age: 81
Discharge: HOME | End: 2021-02-22
Attending: EMERGENCY MEDICINE
Payer: MEDICARE

## 2021-02-22 VITALS
DIASTOLIC BLOOD PRESSURE: 84 MMHG | OXYGEN SATURATION: 96 % | TEMPERATURE: 98.3 F | BODY MASS INDEX: 22.66 KG/M2 | WEIGHT: 141 LBS | RESPIRATION RATE: 19 BRPM | HEART RATE: 62 BPM | HEIGHT: 66 IN | SYSTOLIC BLOOD PRESSURE: 177 MMHG

## 2021-02-22 DIAGNOSIS — I69.391 CVA, OLD, DYSPHAGIA: Primary | ICD-10-CM

## 2021-02-22 LAB
ANION GAP SERPL CALC-SCNC: 10 MMOL/L (ref 3–18)
ATRIAL RATE: 64 BPM
BASOPHILS # BLD: 0 K/UL (ref 0–0.1)
BASOPHILS NFR BLD: 0 % (ref 0–2)
BUN SERPL-MCNC: 70 MG/DL (ref 7–18)
BUN/CREAT SERPL: 20 (ref 12–20)
CALCIUM SERPL-MCNC: 9.4 MG/DL (ref 8.5–10.1)
CALCULATED P AXIS, ECG09: 69 DEGREES
CALCULATED R AXIS, ECG10: 39 DEGREES
CALCULATED T AXIS, ECG11: 60 DEGREES
CHLORIDE SERPL-SCNC: 105 MMOL/L (ref 100–111)
CO2 SERPL-SCNC: 30 MMOL/L (ref 21–32)
CREAT SERPL-MCNC: 3.5 MG/DL (ref 0.6–1.3)
DIAGNOSIS, 93000: NORMAL
DIFFERENTIAL METHOD BLD: ABNORMAL
EOSINOPHIL # BLD: 0.3 K/UL (ref 0–0.4)
EOSINOPHIL NFR BLD: 5 % (ref 0–5)
ERYTHROCYTE [DISTWIDTH] IN BLOOD BY AUTOMATED COUNT: 18.6 % (ref 11.6–14.5)
GLUCOSE BLD STRIP.AUTO-MCNC: 142 MG/DL (ref 70–110)
GLUCOSE SERPL-MCNC: 124 MG/DL (ref 74–99)
HCT VFR BLD AUTO: 30.4 % (ref 36–48)
HGB BLD-MCNC: 9.4 G/DL (ref 13–16)
LYMPHOCYTES # BLD: 1.8 K/UL (ref 0.9–3.6)
LYMPHOCYTES NFR BLD: 24 % (ref 21–52)
MAGNESIUM SERPL-MCNC: 2.4 MG/DL (ref 1.6–2.6)
MCH RBC QN AUTO: 25.7 PG (ref 24–34)
MCHC RBC AUTO-ENTMCNC: 30.9 G/DL (ref 31–37)
MCV RBC AUTO: 83.1 FL (ref 74–97)
MONOCYTES # BLD: 0.8 K/UL (ref 0.05–1.2)
MONOCYTES NFR BLD: 11 % (ref 3–10)
NEUTS SEG # BLD: 4.4 K/UL (ref 1.8–8)
NEUTS SEG NFR BLD: 60 % (ref 40–73)
P-R INTERVAL, ECG05: 136 MS
PLATELET # BLD AUTO: 253 K/UL (ref 135–420)
PMV BLD AUTO: 10 FL (ref 9.2–11.8)
POTASSIUM SERPL-SCNC: 4.8 MMOL/L (ref 3.5–5.5)
Q-T INTERVAL, ECG07: 432 MS
QRS DURATION, ECG06: 92 MS
QTC CALCULATION (BEZET), ECG08: 445 MS
RBC # BLD AUTO: 3.66 M/UL (ref 4.7–5.5)
SODIUM SERPL-SCNC: 145 MMOL/L (ref 136–145)
VENTRICULAR RATE, ECG03: 64 BPM
WBC # BLD AUTO: 7.3 K/UL (ref 4.6–13.2)

## 2021-02-22 PROCEDURE — 93005 ELECTROCARDIOGRAM TRACING: CPT

## 2021-02-22 PROCEDURE — 85025 COMPLETE CBC W/AUTO DIFF WBC: CPT

## 2021-02-22 PROCEDURE — 99285 EMERGENCY DEPT VISIT HI MDM: CPT

## 2021-02-22 PROCEDURE — 80048 BASIC METABOLIC PNL TOTAL CA: CPT

## 2021-02-22 PROCEDURE — 83735 ASSAY OF MAGNESIUM: CPT

## 2021-02-22 PROCEDURE — 82962 GLUCOSE BLOOD TEST: CPT

## 2021-02-22 PROCEDURE — 70551 MRI BRAIN STEM W/O DYE: CPT

## 2021-02-22 NOTE — ED TRIAGE NOTES
Patient states his wife said he has been having slurred speech since his last time being seen (2/17). He states he doesn't feel like his speech is slurred. He denies any weakness, numbness, dizziness or any pain. He has no complaints. His wife called his doctor today and they told him to come to ED. 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

## 2021-02-22 NOTE — ED NOTES
Written and verbal discharge instructions given. Patient verbalizes understanding of same. Patient denies  further questions about treatment and discharge instructions. Left ED with patent airway and steady gait via W/C to wife who is driving pt home. Arm band removed shredded.      COVID WARNINGS GIVEN AND DIRE IMPORTANCE OF FOLLOWING GUIDELINES STRESSED

## 2021-02-22 NOTE — ED PROVIDER NOTES
HPI   Patient was evaluated in our department about a week ago for periodic episodes of difficulty speaking. At the time he was seen in the department he was asymptomatic. 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 Patient was discharged from that visit after discussion with his primary care physician to continue his work-up as an outpatient. Family contacted his primary care physician today stating the patient continues to have episodes of \"difficulty speaking\". The patient continued continues to deny any awareness of this difficulty speaking and says \"I feel fine\". He came back to the emergency room for another evaluation today at the request of his family. At the current time, patient says he feels fine and has no symptoms. He denies headache chest pain or shortness of breath. No other specifics given at this time. Past Medical History:   Diagnosis Date    Arthritis     Chronic constipation     Gout     High cholesterol     Hypertension        Past Surgical History:   Procedure Laterality Date    HX HEENT      tonsillectomy cyst removed from under tongue    HX HERNIA REPAIR      Robot-assisted laparoscopic right inguinal hernia         History reviewed. No pertinent family history.     Social History     Socioeconomic History    Marital status:      Spouse name: Not on file    Number of children: Not on file    Years of education: Not on file    Highest education level: Not on file   Occupational History    Not on file   Social Needs    Financial resource strain: Not on file    Food insecurity     Worry: Not on file     Inability: Not on file    Transportation needs     Medical: Not on file     Non-medical: Not on file   Tobacco Use    Smoking status: Former Smoker    Smokeless tobacco: Former User     Quit date: 3/11/1995   Substance and Sexual Activity    Alcohol use: No    Drug use: No    Sexual activity: Not on file   Lifestyle    Physical activity Days per week: Not on file     Minutes per session: Not on file    Stress: Not on file   Relationships    Social connections     Talks on phone: Not on file     Gets together: Not on file     Attends Church service: Not on file     Active member of club or organization: Not on file     Attends meetings of clubs or organizations: Not on file     Relationship status: Not on file    Intimate partner violence     Fear of current or ex partner: Not on file     Emotionally abused: Not on file     Physically abused: Not on file     Forced sexual activity: Not on file   Other Topics Concern    Not on file   Social History Narrative    Not on file         ALLERGIES: Patient has no known allergies. Review of Systems   Constitutional: Negative. HENT: Negative. Eyes: Negative. Respiratory: Negative. Cardiovascular: Negative. Gastrointestinal: Negative. Genitourinary: Negative. Musculoskeletal: Negative. Skin: Negative. Neurological: Positive for speech difficulty. Psychiatric/Behavioral: Negative. Vitals:    02/22/21 0922 02/22/21 1000 02/22/21 1215 02/22/21 1216   BP: (!) 188/86 (!) 175/72 (!) 174/72    Pulse: 66 64  65   Resp: 16 16  20   Temp: 98.3 °F (36.8 °C)      SpO2: 99% 96%  95%   Weight: 64 kg (141 lb)      Height: 5' 6\" (1.676 m)               Physical Exam  Vitals signs and nursing note reviewed. Constitutional:       Appearance: He is well-developed. HENT:      Head: Normocephalic and atraumatic. Nose: Nose normal.   Eyes:      Pupils: Pupils are equal, round, and reactive to light. Neck:      Musculoskeletal: Neck supple. Cardiovascular:      Rate and Rhythm: Normal rate and regular rhythm. Pulmonary:      Effort: Pulmonary effort is normal.      Breath sounds: Normal breath sounds. Abdominal:      Palpations: Abdomen is soft. Musculoskeletal: Normal range of motion. Skin:     General: Skin is warm and dry.    Neurological:      Mental Status: He is alert and oriented to person, place, and time. Psychiatric:         Mood and Affect: Mood normal.          MDM  Number of Diagnoses or Management Options  Diagnosis management comments: I have spoken with the patient's primary care physician (Dr Bayron Deutsch) and reviewed the results of his ED evaluation especially his MRI. MRI results today are consistent with the CAT scan results from last week. There is no evidence of any new changes in the intervening time since his last visit. His PCP agrees with an outpatient disposition and has made arrangements to see the patient within 48 hours in his office. The patient understands and agrees with this plan as well.   Ayo Richards MD 1:20 PM         Procedures

## 2021-07-11 ENCOUNTER — DOCUMENTATION ONLY (OUTPATIENT)
Dept: NEPHROLOGY | Age: 81
End: 2021-07-11

## 2022-01-10 ENCOUNTER — HOSPITAL ENCOUNTER (OUTPATIENT)
Dept: LAB | Age: 82
Discharge: HOME | End: 2022-01-10
Payer: MEDICARE

## 2022-01-10 LAB
ALBUMIN SERPL-MCNC: 3.8 G/DL (ref 3.4–5)
ANION GAP SERPL CALC-SCNC: 8 MMOL/L (ref 3–18)
BUN SERPL-MCNC: 113 MG/DL (ref 7–18)
BUN/CREAT SERPL: 23 (ref 12–20)
CALCIUM SERPL-MCNC: 10.1 MG/DL (ref 8.5–10.1)
CALCIUM SERPL-MCNC: 10.2 MG/DL (ref 8.5–10.1)
CHLORIDE SERPL-SCNC: 105 MMOL/L (ref 100–111)
CO2 SERPL-SCNC: 28 MMOL/L (ref 21–32)
CREAT SERPL-MCNC: 4.91 MG/DL (ref 0.6–1.3)
ERYTHROCYTE [DISTWIDTH] IN BLOOD BY AUTOMATED COUNT: 13.7 % (ref 11.6–14.5)
GLUCOSE SERPL-MCNC: 138 MG/DL (ref 74–99)
HCT VFR BLD AUTO: 30.6 % (ref 36–48)
HGB BLD-MCNC: 9.5 G/DL (ref 13–16)
MCH RBC QN AUTO: 28.5 PG (ref 24–34)
MCHC RBC AUTO-ENTMCNC: 31 G/DL (ref 31–37)
MCV RBC AUTO: 91.9 FL (ref 78–100)
NRBC # BLD: 0 K/UL (ref 0–0.01)
NRBC BLD-RTO: 0 PER 100 WBC
PHOSPHATE SERPL-MCNC: 4.9 MG/DL (ref 2.5–4.9)
PLATELET # BLD AUTO: 206 K/UL (ref 135–420)
PMV BLD AUTO: 10.6 FL (ref 9.2–11.8)
POTASSIUM SERPL-SCNC: 4.6 MMOL/L (ref 3.5–5.5)
PTH-INTACT SERPL-MCNC: 37.2 PG/ML (ref 18.4–88)
RBC # BLD AUTO: 3.33 M/UL (ref 4.35–5.65)
SODIUM SERPL-SCNC: 141 MMOL/L (ref 136–145)
WBC # BLD AUTO: 7 K/UL (ref 4.6–13.2)

## 2022-01-10 PROCEDURE — 83970 ASSAY OF PARATHORMONE: CPT

## 2022-01-10 PROCEDURE — 85027 COMPLETE CBC AUTOMATED: CPT

## 2022-01-10 PROCEDURE — 36415 COLL VENOUS BLD VENIPUNCTURE: CPT

## 2022-01-10 PROCEDURE — 80069 RENAL FUNCTION PANEL: CPT

## 2022-01-25 ENCOUNTER — TRANSCRIBE ORDER (OUTPATIENT)
Dept: SCHEDULING | Age: 82
End: 2022-01-25

## 2022-01-25 DIAGNOSIS — N18.4 CKD (CHRONIC KIDNEY DISEASE) STAGE 4, GFR 15-29 ML/MIN (HCC): Primary | ICD-10-CM

## 2022-02-17 ENCOUNTER — HOSPITAL ENCOUNTER (OUTPATIENT)
Dept: ULTRASOUND IMAGING | Age: 82
Discharge: HOME | End: 2022-02-17
Attending: INTERNAL MEDICINE
Payer: MEDICARE

## 2022-02-17 ENCOUNTER — HOSPITAL ENCOUNTER (OUTPATIENT)
Dept: LAB | Age: 82
Discharge: HOME | End: 2022-02-17
Attending: INTERNAL MEDICINE
Payer: MEDICARE

## 2022-02-17 DIAGNOSIS — N18.4 CKD (CHRONIC KIDNEY DISEASE) STAGE 4, GFR 15-29 ML/MIN (HCC): ICD-10-CM

## 2022-02-17 LAB
25(OH)D3 SERPL-MCNC: 86 NG/ML (ref 30–100)
ALBUMIN SERPL-MCNC: 3.7 G/DL (ref 3.4–5)
ANION GAP SERPL CALC-SCNC: 7 MMOL/L (ref 3–18)
BASOPHILS # BLD: 0 K/UL (ref 0–0.1)
BASOPHILS NFR BLD: 0 % (ref 0–2)
BUN SERPL-MCNC: 83 MG/DL (ref 7–18)
BUN/CREAT SERPL: 20 (ref 12–20)
CALCIUM SERPL-MCNC: 9.5 MG/DL (ref 8.5–10.1)
CALCIUM SERPL-MCNC: 9.7 MG/DL (ref 8.5–10.1)
CHLORIDE SERPL-SCNC: 102 MMOL/L (ref 100–111)
CO2 SERPL-SCNC: 31 MMOL/L (ref 21–32)
CREAT SERPL-MCNC: 4.18 MG/DL (ref 0.6–1.3)
CREAT UR-MCNC: 58 MG/DL (ref 30–125)
DIFFERENTIAL METHOD BLD: ABNORMAL
EOSINOPHIL # BLD: 0.3 K/UL (ref 0–0.4)
EOSINOPHIL NFR BLD: 4 % (ref 0–5)
ERYTHROCYTE [DISTWIDTH] IN BLOOD BY AUTOMATED COUNT: 14.4 % (ref 11.6–14.5)
GLUCOSE SERPL-MCNC: 126 MG/DL (ref 74–99)
HCT VFR BLD AUTO: 31.8 % (ref 36–48)
HGB BLD-MCNC: 9.9 G/DL (ref 13–16)
IMM GRANULOCYTES # BLD AUTO: 0 K/UL (ref 0–0.04)
IMM GRANULOCYTES NFR BLD AUTO: 0 % (ref 0–0.5)
LYMPHOCYTES # BLD: 1.4 K/UL (ref 0.9–3.6)
LYMPHOCYTES NFR BLD: 22 % (ref 21–52)
MCH RBC QN AUTO: 28.4 PG (ref 24–34)
MCHC RBC AUTO-ENTMCNC: 31.1 G/DL (ref 31–37)
MCV RBC AUTO: 91.4 FL (ref 78–100)
MONOCYTES # BLD: 0.7 K/UL (ref 0.05–1.2)
MONOCYTES NFR BLD: 12 % (ref 3–10)
NEUTS SEG # BLD: 3.9 K/UL (ref 1.8–8)
NEUTS SEG NFR BLD: 62 % (ref 40–73)
NRBC # BLD: 0 K/UL (ref 0–0.01)
NRBC BLD-RTO: 0 PER 100 WBC
PHOSPHATE SERPL-MCNC: 4.2 MG/DL (ref 2.5–4.9)
PLATELET # BLD AUTO: 180 K/UL (ref 135–420)
PMV BLD AUTO: 10.8 FL (ref 9.2–11.8)
POTASSIUM SERPL-SCNC: 3.9 MMOL/L (ref 3.5–5.5)
PROT UR-MCNC: 30 MG/DL
PTH-INTACT SERPL-MCNC: 104.5 PG/ML (ref 18.4–88)
RBC # BLD AUTO: 3.48 M/UL (ref 4.35–5.65)
SODIUM SERPL-SCNC: 140 MMOL/L (ref 136–145)
WBC # BLD AUTO: 6.4 K/UL (ref 4.6–13.2)

## 2022-02-17 PROCEDURE — 76770 US EXAM ABDO BACK WALL COMP: CPT

## 2022-02-17 PROCEDURE — 82570 ASSAY OF URINE CREATININE: CPT

## 2022-02-17 PROCEDURE — 80069 RENAL FUNCTION PANEL: CPT

## 2022-02-17 PROCEDURE — 85025 COMPLETE CBC W/AUTO DIFF WBC: CPT

## 2022-02-17 PROCEDURE — 84156 ASSAY OF PROTEIN URINE: CPT

## 2022-02-17 PROCEDURE — 36415 COLL VENOUS BLD VENIPUNCTURE: CPT

## 2022-02-17 PROCEDURE — 82306 VITAMIN D 25 HYDROXY: CPT

## 2022-02-17 PROCEDURE — 83970 ASSAY OF PARATHORMONE: CPT

## 2022-03-19 PROBLEM — K40.90 INGUINAL HERNIA OF RIGHT SIDE WITHOUT OBSTRUCTION OR GANGRENE: Status: ACTIVE | Noted: 2020-03-16

## 2022-04-27 ENCOUNTER — HOSPITAL ENCOUNTER (OUTPATIENT)
Age: 82
Discharge: HOME | End: 2022-04-27
Attending: PHYSICIAN ASSISTANT
Payer: MEDICARE

## 2022-04-27 DIAGNOSIS — N28.1 COMPLEX RENAL CYST: ICD-10-CM

## 2022-04-27 PROCEDURE — 74181 MRI ABDOMEN W/O CONTRAST: CPT

## 2022-04-27 PROCEDURE — 82565 ASSAY OF CREATININE: CPT

## 2022-04-28 LAB — CREAT UR-MCNC: 3.7 MG/DL (ref 0.6–1.3)

## 2022-06-05 ENCOUNTER — HOSPITAL ENCOUNTER (EMERGENCY)
Age: 82
Discharge: HOME | End: 2022-06-05
Attending: EMERGENCY MEDICINE
Payer: MEDICARE

## 2022-06-05 ENCOUNTER — APPOINTMENT (OUTPATIENT)
Dept: CT IMAGING | Age: 82
End: 2022-06-05
Attending: EMERGENCY MEDICINE
Payer: MEDICARE

## 2022-06-05 VITALS
DIASTOLIC BLOOD PRESSURE: 65 MMHG | WEIGHT: 138 LBS | BODY MASS INDEX: 22.99 KG/M2 | TEMPERATURE: 98.8 F | SYSTOLIC BLOOD PRESSURE: 149 MMHG | OXYGEN SATURATION: 97 % | RESPIRATION RATE: 18 BRPM | HEIGHT: 65 IN | HEART RATE: 71 BPM

## 2022-06-05 DIAGNOSIS — K52.9 NONINFECTIOUS GASTROENTERITIS, UNSPECIFIED TYPE: Primary | ICD-10-CM

## 2022-06-05 DIAGNOSIS — N18.4 STAGE 4 CHRONIC KIDNEY DISEASE (HCC): ICD-10-CM

## 2022-06-05 DIAGNOSIS — K80.20 CALCULUS OF GALLBLADDER WITHOUT CHOLECYSTITIS WITHOUT OBSTRUCTION: ICD-10-CM

## 2022-06-05 LAB
ALBUMIN SERPL-MCNC: 3.7 G/DL (ref 3.4–5)
ALBUMIN/GLOB SERPL: 0.9 {RATIO} (ref 0.8–1.7)
ALP SERPL-CCNC: 78 U/L (ref 45–117)
ALT SERPL-CCNC: 32 U/L (ref 16–61)
ANION GAP SERPL CALC-SCNC: 7 MMOL/L (ref 3–18)
APPEARANCE UR: CLEAR
APTT PPP: 32.9 SEC (ref 23–36.4)
AST SERPL-CCNC: 32 U/L (ref 10–38)
BACTERIA URNS QL MICRO: NEGATIVE /HPF
BASOPHILS # BLD: 0 K/UL (ref 0–0.1)
BASOPHILS NFR BLD: 0 % (ref 0–2)
BILIRUB SERPL-MCNC: 0.6 MG/DL (ref 0.2–1)
BILIRUB UR QL: NEGATIVE
BUN SERPL-MCNC: 62 MG/DL (ref 7–18)
BUN/CREAT SERPL: 14 (ref 12–20)
CALCIUM SERPL-MCNC: 9.7 MG/DL (ref 8.5–10.1)
CHLORIDE SERPL-SCNC: 105 MMOL/L (ref 100–111)
CO2 SERPL-SCNC: 31 MMOL/L (ref 21–32)
COLOR UR: YELLOW
CREAT SERPL-MCNC: 4.33 MG/DL (ref 0.6–1.3)
DIFFERENTIAL METHOD BLD: ABNORMAL
EOSINOPHIL # BLD: 0.2 K/UL (ref 0–0.4)
EOSINOPHIL NFR BLD: 3 % (ref 0–5)
EPITH CASTS URNS QL MICRO: ABNORMAL /LPF (ref 0–5)
ERYTHROCYTE [DISTWIDTH] IN BLOOD BY AUTOMATED COUNT: 14.1 % (ref 11.6–14.5)
GLOBULIN SER CALC-MCNC: 4.3 G/DL (ref 2–4)
GLUCOSE SERPL-MCNC: 127 MG/DL (ref 74–99)
GLUCOSE UR STRIP.AUTO-MCNC: NEGATIVE MG/DL
HCT VFR BLD AUTO: 32.6 % (ref 36–48)
HGB BLD-MCNC: 10.4 G/DL (ref 13–16)
HGB UR QL STRIP: NEGATIVE
IMM GRANULOCYTES # BLD AUTO: 0 K/UL (ref 0–0.04)
IMM GRANULOCYTES NFR BLD AUTO: 0 % (ref 0–0.5)
INR PPP: 1.1 (ref 0.8–1.2)
KETONES UR QL STRIP.AUTO: NEGATIVE MG/DL
LEUKOCYTE ESTERASE UR QL STRIP.AUTO: NEGATIVE
LIPASE SERPL-CCNC: 552 U/L (ref 73–393)
LYMPHOCYTES # BLD: 1.6 K/UL (ref 0.9–3.6)
LYMPHOCYTES NFR BLD: 22 % (ref 21–52)
MCH RBC QN AUTO: 28.7 PG (ref 24–34)
MCHC RBC AUTO-ENTMCNC: 31.9 G/DL (ref 31–37)
MCV RBC AUTO: 89.8 FL (ref 78–100)
MONOCYTES # BLD: 0.8 K/UL (ref 0.05–1.2)
MONOCYTES NFR BLD: 12 % (ref 3–10)
MUCOUS THREADS URNS QL MICRO: ABNORMAL /LPF
NEUTS SEG # BLD: 4.6 K/UL (ref 1.8–8)
NEUTS SEG NFR BLD: 64 % (ref 40–73)
NITRITE UR QL STRIP.AUTO: NEGATIVE
NRBC # BLD: 0 K/UL (ref 0–0.01)
NRBC BLD-RTO: 0 PER 100 WBC
PH UR STRIP: 5.5 [PH] (ref 5–8)
PLATELET # BLD AUTO: 202 K/UL (ref 135–420)
PMV BLD AUTO: 10.8 FL (ref 9.2–11.8)
POTASSIUM SERPL-SCNC: 3.7 MMOL/L (ref 3.5–5.5)
PROT SERPL-MCNC: 8 G/DL (ref 6.4–8.2)
PROT UR STRIP-MCNC: 30 MG/DL
PROTHROMBIN TIME: 14.4 SEC (ref 11.5–15.2)
RBC # BLD AUTO: 3.63 M/UL (ref 4.35–5.65)
RBC #/AREA URNS HPF: ABNORMAL /HPF (ref 0–5)
SODIUM SERPL-SCNC: 143 MMOL/L (ref 136–145)
SP GR UR REFRACTOMETRY: 1.01 (ref 1–1.03)
UROBILINOGEN UR QL STRIP.AUTO: 0.2 EU/DL (ref 0.2–1)
WBC # BLD AUTO: 7.2 K/UL (ref 4.6–13.2)
WBC URNS QL MICRO: ABNORMAL /HPF (ref 0–4)

## 2022-06-05 PROCEDURE — 99284 EMERGENCY DEPT VISIT MOD MDM: CPT

## 2022-06-05 PROCEDURE — 85025 COMPLETE CBC W/AUTO DIFF WBC: CPT

## 2022-06-05 PROCEDURE — 80053 COMPREHEN METABOLIC PANEL: CPT

## 2022-06-05 PROCEDURE — 85610 PROTHROMBIN TIME: CPT

## 2022-06-05 PROCEDURE — 81001 URINALYSIS AUTO W/SCOPE: CPT

## 2022-06-05 PROCEDURE — 74011250637 HC RX REV CODE- 250/637: Performed by: EMERGENCY MEDICINE

## 2022-06-05 PROCEDURE — 74176 CT ABD & PELVIS W/O CONTRAST: CPT

## 2022-06-05 PROCEDURE — 85730 THROMBOPLASTIN TIME PARTIAL: CPT

## 2022-06-05 PROCEDURE — 83690 ASSAY OF LIPASE: CPT

## 2022-06-05 RX ORDER — TRAMADOL HYDROCHLORIDE 50 MG/1
50 TABLET ORAL
Qty: 20 TABLET | Refills: 0 | Status: SHIPPED | OUTPATIENT
Start: 2022-06-05 | End: 2022-06-10

## 2022-06-05 RX ORDER — METRONIDAZOLE 500 MG/1
500 TABLET ORAL 2 TIMES DAILY
Qty: 14 TABLET | Refills: 0 | Status: SHIPPED | OUTPATIENT
Start: 2022-06-05 | End: 2022-06-12

## 2022-06-05 RX ORDER — METRONIDAZOLE 500 MG/1
500 TABLET ORAL
Status: COMPLETED | OUTPATIENT
Start: 2022-06-05 | End: 2022-06-05

## 2022-06-05 RX ORDER — CIPROFLOXACIN 500 MG/1
500 TABLET ORAL
Status: COMPLETED | OUTPATIENT
Start: 2022-06-05 | End: 2022-06-05

## 2022-06-05 RX ORDER — ONDANSETRON 4 MG/1
4 TABLET, FILM COATED ORAL
Qty: 20 TABLET | Refills: 0 | Status: SHIPPED | OUTPATIENT
Start: 2022-06-05

## 2022-06-05 RX ORDER — CIPROFLOXACIN 500 MG/1
500 TABLET ORAL 2 TIMES DAILY
Qty: 14 TABLET | Refills: 0 | Status: SHIPPED | OUTPATIENT
Start: 2022-06-05 | End: 2022-06-12

## 2022-06-05 RX ADMIN — METRONIDAZOLE 500 MG: 500 TABLET ORAL at 15:10

## 2022-06-05 RX ADMIN — CIPROFLOXACIN 500 MG: 500 TABLET, FILM COATED ORAL at 15:10

## 2022-06-05 NOTE — ED NOTES
Patient resting on stretcher, NAD noted, call bell in reach, bed low and locked with side rails up x 2; Reports pain 0/10. Updated on plan of care, patient verbalizes understanding. This nurse will continue to monitor.

## 2022-06-05 NOTE — ED TRIAGE NOTES
Pt c/o GENERALIZED ABDOMINAL PAIN  X 2 days    Is not tender when palpated    Took mylanta and helped some

## 2022-06-05 NOTE — ED PROVIDER NOTES
EMERGENCY DEPARTMENT HISTORY AND PHYSICAL EXAM    12:57 PM      Date: 6/5/2022  Patient Name: Padmini Wang    History of Presenting Illness     Chief Complaint   Patient presents with    Abdominal Pain         History Provided By: Patient  Location/Duration/Severity/Modifying factors   Patient is 28-year-old male with history of hypertension arthritis, dyslipidemia, chronic constipation, gout, the presents emergency department with complaint of crampy abdominal pain is been intermittent for several weeks however worsened over the last 48 hours. Patient was given Mylanta for that in the past and has helped. The patient started having increasing symptoms over the last 48 hours and started taking Mylanta again without any improvement. Patient notes that he has abdominal cramping and has not associated with any nausea, vomiting, or constipation. Patient denies any change in his stools. Patient notes that the abdominal pain is better now that he went to be evaluated today. Patient denies any recent abdominal surgeries. 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  In addition the patient has problems with his kidneys and has 2 masses on his kidneys and being followed by urologist and nephrologist.  The patient denies any change in his appetite. There are no other known aggravating alleviating factors. Patient is not a smoker, drinker, nor drug user. 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 PCP: Papo Hogan MD    Current Outpatient Medications   Medication Sig Dispense Refill    ciprofloxacin HCl (Cipro) 500 mg tablet Take 1 Tablet by mouth two (2) times a day for 7 days. 14 Tablet 0    metroNIDAZOLE (FlagyL) 500 mg tablet Take 1 Tablet by mouth two (2) times a day for 7 days. 14 Tablet 0    traMADoL (Ultram) 50 mg tablet Take 1 Tablet by mouth every six (6) hours as needed for Pain for up to 5 days.  Max Daily Amount: 200 mg. 20 Tablet 0    ondansetron hcl (Zofran) 4 mg tablet Take 1 Tablet by mouth every eight (8) hours as needed for Nausea. 20 Tablet 0    nitroglycerin (NITROSTAT) 0.4 mg SL tablet       ferrous sulfate (IRON) 325 mg (65 mg iron) EC tablet 1 tab(s)      clopidogreL (PLAVIX) 75 mg tab       allopurinoL (ZYLOPRIM) 100 mg tablet Take  by mouth daily.  amLODIPine (NORVASC) 10 mg tablet Take  by mouth daily.  atorvastatin (LIPITOR) 40 mg tablet Take  by mouth daily.  carvediloL (COREG) 25 mg tablet Take 25 mg by mouth two (2) times daily (with meals).  doxazosin (CARDURA) 4 mg tablet Take  by mouth daily.  furosemide (LASIX) 20 mg tablet Take  by mouth daily.  hydrALAZINE (APRESOLINE) 50 mg tablet Take 25 mg by mouth three (3) times daily.  loratadine (CLARITIN) 10 mg tablet Take 10 mg by mouth.  acetaminophen (TYLENOL) 325 mg tablet Take 2 Tabs by mouth every six (6) hours as needed for Pain. 20 Tab 0       Past History     Past Medical History:  Past Medical History:   Diagnosis Date    Arthritis     Chronic constipation     Gout     High cholesterol     Hypertension        Past Surgical History:  Past Surgical History:   Procedure Laterality Date    HX HEENT      tonsillectomy cyst removed from under tongue    HX HERNIA REPAIR      Robot-assisted laparoscopic right inguinal hernia       Family History:  No family history on file. Social History:  Social History     Tobacco Use    Smoking status: Former Smoker    Smokeless tobacco: Former User     Quit date: 3/11/1995   Substance Use Topics    Alcohol use: No    Drug use: No       Allergies:  No Known Allergies      Review of Systems       Review of Systems   Constitutional: Negative for activity change, fatigue and fever. HENT: Negative for congestion and rhinorrhea. Eyes: Negative for visual disturbance. Respiratory: Negative for shortness of breath.     Cardiovascular: Negative for chest pain and palpitations. Gastrointestinal: Positive for abdominal pain. Negative for diarrhea, nausea and vomiting. Genitourinary: Negative for dysuria and hematuria. Musculoskeletal: Negative for back pain. Skin: Negative for rash. Neurological: Negative for dizziness, weakness and light-headedness. All other systems reviewed and are negative. Physical Exam     Visit Vitals  BP (!) 149/65 (BP 1 Location: Left upper arm, BP Patient Position: At rest)   Pulse 71   Temp 98.8 °F (37.1 °C)   Resp 18   Ht 5' 5\" (1.651 m)   Wt 62.6 kg (138 lb)   SpO2 97%   BMI 22.96 kg/m²         Physical Exam  Vitals and nursing note reviewed. Constitutional:       General: He is not in acute distress. Appearance: He is well-developed. HENT:      Head: Normocephalic and atraumatic. Right Ear: External ear normal.      Left Ear: External ear normal.      Nose: Nose normal.   Eyes:      General: No scleral icterus. Conjunctiva/sclera: Conjunctivae normal.      Pupils: Pupils are equal, round, and reactive to light. Neck:      Thyroid: No thyromegaly. Vascular: No JVD. Trachea: No tracheal deviation. Cardiovascular:      Rate and Rhythm: Normal rate and regular rhythm. Heart sounds: Normal heart sounds. No murmur heard. No friction rub. No gallop. Pulmonary:      Effort: Pulmonary effort is normal.      Breath sounds: Normal breath sounds. Chest:      Chest wall: No tenderness. Abdominal:      General: Bowel sounds are normal. There is no distension. Palpations: Abdomen is soft. Tenderness: There is generalized abdominal tenderness. There is no guarding or rebound. Musculoskeletal:         General: No tenderness. Normal range of motion. Cervical back: Normal range of motion and neck supple. Lymphadenopathy:      Cervical: No cervical adenopathy. Skin:     General: Skin is warm and dry.    Neurological:      Mental Status: He is alert and oriented to person, place, and time. Cranial Nerves: No cranial nerve deficit. Coordination: Coordination normal.      Comments: No sensory loss, Gait normal, Motor 5/5   Psychiatric:         Behavior: Behavior normal.         Thought Content: Thought content normal.         Judgment: Judgment normal.      Comments: Supportive family at the bedside           Diagnostic Study Results     Labs -  Recent Results (from the past 12 hour(s))   CBC WITH AUTOMATED DIFF    Collection Time: 06/05/22 12:00 PM   Result Value Ref Range    WBC 7.2 4.6 - 13.2 K/uL    RBC 3.63 (L) 4.35 - 5.65 M/uL    HGB 10.4 (L) 13.0 - 16.0 g/dL    HCT 32.6 (L) 36.0 - 48.0 %    MCV 89.8 78.0 - 100.0 FL    MCH 28.7 24.0 - 34.0 PG    MCHC 31.9 31.0 - 37.0 g/dL    RDW 14.1 11.6 - 14.5 %    PLATELET 043 708 - 256 K/uL    MPV 10.8 9.2 - 11.8 FL    NRBC 0.0 0  WBC    ABSOLUTE NRBC 0.00 0.00 - 0.01 K/uL    NEUTROPHILS 64 40 - 73 %    LYMPHOCYTES 22 21 - 52 %    MONOCYTES 12 (H) 3 - 10 %    EOSINOPHILS 3 0 - 5 %    BASOPHILS 0 0 - 2 %    IMMATURE GRANULOCYTES 0 0.0 - 0.5 %    ABS. NEUTROPHILS 4.6 1.8 - 8.0 K/UL    ABS. LYMPHOCYTES 1.6 0.9 - 3.6 K/UL    ABS. MONOCYTES 0.8 0.05 - 1.2 K/UL    ABS. EOSINOPHILS 0.2 0.0 - 0.4 K/UL    ABS. BASOPHILS 0.0 0.0 - 0.1 K/UL    ABS. IMM. GRANS. 0.0 0.00 - 0.04 K/UL    DF AUTOMATED     METABOLIC PANEL, COMPREHENSIVE    Collection Time: 06/05/22 12:00 PM   Result Value Ref Range    Sodium 143 136 - 145 mmol/L    Potassium 3.7 3.5 - 5.5 mmol/L    Chloride 105 100 - 111 mmol/L    CO2 31 21 - 32 mmol/L    Anion gap 7 3.0 - 18 mmol/L    Glucose 127 (H) 74 - 99 mg/dL    BUN 62 (H) 7.0 - 18 MG/DL    Creatinine 4.33 (H) 0.6 - 1.3 MG/DL    BUN/Creatinine ratio 14 12 - 20      GFR est AA 16 (L) >60 ml/min/1.73m2    GFR est non-AA 13 (L) >60 ml/min/1.73m2    Calcium 9.7 8.5 - 10.1 MG/DL    Bilirubin, total 0.6 0.2 - 1.0 MG/DL    ALT (SGPT) 32 16 - 61 U/L    AST (SGOT) 32 10 - 38 U/L    Alk.  phosphatase 78 45 - 117 U/L    Protein, total 8.0 6.4 - 8.2 g/dL    Albumin 3.7 3.4 - 5.0 g/dL    Globulin 4.3 (H) 2.0 - 4.0 g/dL    A-G Ratio 0.9 0.8 - 1.7     URINALYSIS W/ RFLX MICROSCOPIC    Collection Time: 06/05/22 12:00 PM   Result Value Ref Range    Color YELLOW      Appearance CLEAR      Specific gravity 1.015 1.005 - 1.030      pH (UA) 5.5 5.0 - 8.0      Protein 30 (A) NEG mg/dL    Glucose Negative NEG mg/dL    Ketone Negative NEG mg/dL    Bilirubin Negative NEG      Blood Negative NEG      Urobilinogen 0.2 0.2 - 1.0 EU/dL    Nitrites Negative NEG      Leukocyte Esterase Negative NEG     PROTHROMBIN TIME + INR    Collection Time: 06/05/22 12:00 PM   Result Value Ref Range    Prothrombin time 14.4 11.5 - 15.2 sec    INR 1.1 0.8 - 1.2     PTT    Collection Time: 06/05/22 12:00 PM   Result Value Ref Range    aPTT 32.9 23.0 - 36.4 SEC   LIPASE    Collection Time: 06/05/22 12:00 PM   Result Value Ref Range    Lipase 552 (H) 73 - 393 U/L   URINE MICROSCOPIC ONLY    Collection Time: 06/05/22 12:00 PM   Result Value Ref Range    WBC NONE 0 - 4 /hpf    RBC NONE 0 - 5 /hpf    Epithelial cells FEW 0 - 5 /lpf    Bacteria Negative NEG /hpf    Mucus 1+ (A) NEG /lpf       Radiologic Studies -   CT ABD PELV WO CONT   Final Result      Rather extensive stranding in the intra-abdominal fat of the mid to upper   abdomen, right greater than left. Uncertain etiology, possibly related to a   colitis. Assessment of the colon is limited by nondistention but there is   evidence for colonic mural thickening best seen in the ascending and proximal   transverse colon. Nonspecific regarding etiology of colitis, possibly   inflammatory or infectious, clinical correlation is recommended to exclude   suspicion for ischemic etiology as noted above. Very small volume of   ascites/free fluid best seen around the liver. Prominent burden of   calcifications noted in the aorta and its major branches.       Small 4 mm subpleural pulmonary nodule left lower lobe partially visualized on image 1. Findings suspect for cholelithiasis as described. Bilateral renal complex cysts better assessed on recent contrast-enhanced MRI. Additional findings as above. Medical Decision Making   I am the first provider for this patient. I reviewed the vital signs, available nursing notes, past medical history, past surgical history, family history and social history. Vital Signs-Reviewed the patient's vital signs. Records Reviewed: Nursing Notes, Old Medical Records, Previous Radiology Studies and Previous Laboratory Studies (Time of Review: 12:57 PM)    ED Course: Progress Notes, Reevaluation, and Consults:     Was discussed with Dr. Viviana Chapa to follow the CT scan and pending labs and to reevaluate the patient. Inga Galicia, DO 1:00 PM      Provider Notes (Medical Decision Making):   MDM  Number of Diagnoses or Management Options  Diagnosis management comments: She is 22-year-old male with a history of hypertension, renal masses, gout, constipation, the presents emergency department with abdominal pain this been increasing for the last 48 hours but has had abdominal for several months. The patient usually has improvement with supportive care but has not been working. Patient's abdomen is only mildly tender however given his age and the fact that this is worsening we will proceed with a CT of his abdomen pelvis without contrast given his history of renal abnormalities. We will follow patient's abdominal labs, CT of the abdomen, urinalysis, and reevaluate. Procedures      Dr. Brianna Grant notes - I assumed care of this patient at 1:00pm - Patient pending CT scan results. He states that he began to experience his intermittent pain Friday night. He states that he experienced similar pain several month ago and states that his PCP told him to take Mylanta which help with his pain. He experienced loose brown stool after the onset of his symptoms.   Patient is tolerating PO fluids. Recent Results (from the past 12 hour(s))   CBC WITH AUTOMATED DIFF    Collection Time: 06/05/22 12:00 PM   Result Value Ref Range    WBC 7.2 4.6 - 13.2 K/uL    RBC 3.63 (L) 4.35 - 5.65 M/uL    HGB 10.4 (L) 13.0 - 16.0 g/dL    HCT 32.6 (L) 36.0 - 48.0 %    MCV 89.8 78.0 - 100.0 FL    MCH 28.7 24.0 - 34.0 PG    MCHC 31.9 31.0 - 37.0 g/dL    RDW 14.1 11.6 - 14.5 %    PLATELET 964 628 - 377 K/uL    MPV 10.8 9.2 - 11.8 FL    NRBC 0.0 0  WBC    ABSOLUTE NRBC 0.00 0.00 - 0.01 K/uL    NEUTROPHILS 64 40 - 73 %    LYMPHOCYTES 22 21 - 52 %    MONOCYTES 12 (H) 3 - 10 %    EOSINOPHILS 3 0 - 5 %    BASOPHILS 0 0 - 2 %    IMMATURE GRANULOCYTES 0 0.0 - 0.5 %    ABS. NEUTROPHILS 4.6 1.8 - 8.0 K/UL    ABS. LYMPHOCYTES 1.6 0.9 - 3.6 K/UL    ABS. MONOCYTES 0.8 0.05 - 1.2 K/UL    ABS. EOSINOPHILS 0.2 0.0 - 0.4 K/UL    ABS. BASOPHILS 0.0 0.0 - 0.1 K/UL    ABS. IMM. GRANS. 0.0 0.00 - 0.04 K/UL    DF AUTOMATED     METABOLIC PANEL, COMPREHENSIVE    Collection Time: 06/05/22 12:00 PM   Result Value Ref Range    Sodium 143 136 - 145 mmol/L    Potassium 3.7 3.5 - 5.5 mmol/L    Chloride 105 100 - 111 mmol/L    CO2 31 21 - 32 mmol/L    Anion gap 7 3.0 - 18 mmol/L    Glucose 127 (H) 74 - 99 mg/dL    BUN 62 (H) 7.0 - 18 MG/DL    Creatinine 4.33 (H) 0.6 - 1.3 MG/DL    BUN/Creatinine ratio 14 12 - 20      GFR est AA 16 (L) >60 ml/min/1.73m2    GFR est non-AA 13 (L) >60 ml/min/1.73m2    Calcium 9.7 8.5 - 10.1 MG/DL    Bilirubin, total 0.6 0.2 - 1.0 MG/DL    ALT (SGPT) 32 16 - 61 U/L    AST (SGOT) 32 10 - 38 U/L    Alk.  phosphatase 78 45 - 117 U/L    Protein, total 8.0 6.4 - 8.2 g/dL    Albumin 3.7 3.4 - 5.0 g/dL    Globulin 4.3 (H) 2.0 - 4.0 g/dL    A-G Ratio 0.9 0.8 - 1.7     URINALYSIS W/ RFLX MICROSCOPIC    Collection Time: 06/05/22 12:00 PM   Result Value Ref Range    Color YELLOW      Appearance CLEAR      Specific gravity 1.015 1.005 - 1.030      pH (UA) 5.5 5.0 - 8.0      Protein 30 (A) NEG mg/dL    Glucose Negative NEG mg/dL    Ketone Negative NEG mg/dL    Bilirubin Negative NEG      Blood Negative NEG      Urobilinogen 0.2 0.2 - 1.0 EU/dL    Nitrites Negative NEG      Leukocyte Esterase Negative NEG     PROTHROMBIN TIME + INR    Collection Time: 06/05/22 12:00 PM   Result Value Ref Range    Prothrombin time 14.4 11.5 - 15.2 sec    INR 1.1 0.8 - 1.2     PTT    Collection Time: 06/05/22 12:00 PM   Result Value Ref Range    aPTT 32.9 23.0 - 36.4 SEC   LIPASE    Collection Time: 06/05/22 12:00 PM   Result Value Ref Range    Lipase 552 (H) 73 - 393 U/L   URINE MICROSCOPIC ONLY    Collection Time: 06/05/22 12:00 PM   Result Value Ref Range    WBC NONE 0 - 4 /hpf    RBC NONE 0 - 5 /hpf    Epithelial cells FEW 0 - 5 /lpf    Bacteria Negative NEG /hpf    Mucus 1+ (A) NEG /lpf     CT ABD PELV WO CONT   Final Result      Rather extensive stranding in the intra-abdominal fat of the mid to upper   abdomen, right greater than left. Uncertain etiology, possibly related to a   colitis. Assessment of the colon is limited by nondistention but there is   evidence for colonic mural thickening best seen in the ascending and proximal   transverse colon. Nonspecific regarding etiology of colitis, possibly   inflammatory or infectious, clinical correlation is recommended to exclude   suspicion for ischemic etiology as noted above. Very small volume of   ascites/free fluid best seen around the liver. Prominent burden of   calcifications noted in the aorta and its major branches. Small 4 mm subpleural pulmonary nodule left lower lobe partially visualized on   image 1. Findings suspect for cholelithiasis as described. Bilateral renal complex cysts better assessed on recent contrast-enhanced MRI. Additional findings as above. Diagnosis     Clinical Impression:   1. Noninfectious gastroenteritis, unspecified type    2. Calculus of gallbladder without cholecystitis without obstruction    3.  Stage 4 chronic kidney disease ([facility] [address]        Disposition:DC    Follow-up Information     Follow up With Specialties Details Why Contact Info    Yovana Hodge MD Family Medicine Schedule an appointment as soon as possible for a visit in 1 day  [address] [place] [place] [address] [facility] [address]      Delmi Pinto MD Gastroenterology, Internal Medicine Physician Schedule an appointment as soon as possible for a visit in 2 days  [address]      [address] [facility] EMERGENCY DEPT Emergency Medicine  As needed, If symptoms worsen [facility] [address] [facility]    Eris Albrecht MD Colon and Rectal Surgery Schedule an appointment as soon as possible for a visit in 2 days  [address] [phone]             Patient's Medications   Start Taking    CIPROFLOXACIN HCL (CIPRO) 500 MG TABLET    Take 1 Tablet by mouth two (2) times a day for 7 days. METRONIDAZOLE (FLAGYL) 500 MG TABLET    Take 1 Tablet by mouth two (2) times a day for 7 days. ONDANSETRON HCL (ZOFRAN) 4 MG TABLET    Take 1 Tablet by mouth every eight (8) hours as needed for Nausea. TRAMADOL (ULTRAM) 50 MG TABLET    Take 1 Tablet by mouth every six (6) hours as needed for Pain for up to 5 days. Max Daily Amount: 200 mg. Continue Taking    ACETAMINOPHEN (TYLENOL) 325 MG TABLET    Take 2 Tabs by mouth every six (6) hours as needed for Pain. ALLOPURINOL (ZYLOPRIM) 100 MG TABLET    Take  by mouth daily. AMLODIPINE (NORVASC) 10 MG TABLET    Take  by mouth daily. ATORVASTATIN (LIPITOR) 40 MG TABLET    Take  by mouth daily. CARVEDILOL (COREG) 25 MG TABLET    Take 25 mg by mouth two (2) times daily (with meals). CLOPIDOGREL (PLAVIX) 75 MG TAB        DOXAZOSIN (CARDURA) 4 MG TABLET    Take  by mouth daily.     FERROUS SULFATE (IRON) 325 MG (65 MG IRON) EC TABLET    1 tab(s)    FUROSEMIDE (LASIX) 20 MG TABLET    Take  by mouth daily.    HYDRALAZINE (APRESOLINE) 50 MG TABLET    Take 25 mg by mouth three (3) times daily. LORATADINE (CLARITIN) 10 MG TABLET    Take 10 mg by mouth. NITROGLYCERIN (NITROSTAT) 0.4 MG SL TABLET       These Medications have changed    No medications on file   Stop Taking    No medications on file     Disclaimer: Sections of this note are dictated using utilizing voice recognition software. Minor typographical errors may be present. If questions arise, please do not hesitate to contact me or call our department.

## 2022-06-05 NOTE — ED NOTES
Assumed care of patient, A&Ox4, Resp even and unlabored, NAD noted or indicated. Pt presents with complaints of severe abdominal pain x 2 days, denies nausea, vomiting, SOB and chest pain. Pt stated that abdominal pain comes and goes. Pt has a Hx of chronic constipation. Pt appears well. At baseline and self sufficient. Pt is able to ambulate with a steady gait. Pt connected to monitor, MD REN at bedside. This RN to continue to monitor and maintain safety.     Past Medical History:   Diagnosis Date    Arthritis     Chronic constipation     Gout     High cholesterol     Hypertension        Past Surgical History:   Procedure Laterality Date    HX HEENT      tonsillectomy cyst removed from under tongue    HX HERNIA REPAIR      Robot-assisted laparoscopic right inguinal hernia

## 2022-06-21 ENCOUNTER — HOSPITAL ENCOUNTER (EMERGENCY)
Age: 82
Discharge: HOME | End: 2022-06-21
Attending: EMERGENCY MEDICINE
Payer: MEDICARE

## 2022-06-21 ENCOUNTER — APPOINTMENT (OUTPATIENT)
Dept: GENERAL RADIOLOGY | Age: 82
End: 2022-06-21
Attending: EMERGENCY MEDICINE
Payer: MEDICARE

## 2022-06-21 VITALS
BODY MASS INDEX: 22.02 KG/M2 | DIASTOLIC BLOOD PRESSURE: 79 MMHG | HEIGHT: 66 IN | SYSTOLIC BLOOD PRESSURE: 147 MMHG | HEART RATE: 79 BPM | RESPIRATION RATE: 16 BRPM | WEIGHT: 137 LBS | TEMPERATURE: 98.4 F | OXYGEN SATURATION: 98 %

## 2022-06-21 DIAGNOSIS — M25.532 ACUTE PAIN OF LEFT WRIST: Primary | ICD-10-CM

## 2022-06-21 DIAGNOSIS — Z87.39 PERSONAL HISTORY OF GOUT: ICD-10-CM

## 2022-06-21 PROCEDURE — 74011250637 HC RX REV CODE- 250/637: Performed by: EMERGENCY MEDICINE

## 2022-06-21 PROCEDURE — 99283 EMERGENCY DEPT VISIT LOW MDM: CPT

## 2022-06-21 PROCEDURE — 74011636637 HC RX REV CODE- 636/637: Performed by: EMERGENCY MEDICINE

## 2022-06-21 PROCEDURE — 73130 X-RAY EXAM OF HAND: CPT

## 2022-06-21 RX ORDER — PREDNISONE 20 MG/1
40 TABLET ORAL
Status: COMPLETED | OUTPATIENT
Start: 2022-06-21 | End: 2022-06-21

## 2022-06-21 RX ORDER — HYDROCODONE BITARTRATE AND ACETAMINOPHEN 7.5; 325 MG/1; MG/1
1 TABLET ORAL
Status: COMPLETED | OUTPATIENT
Start: 2022-06-21 | End: 2022-06-21

## 2022-06-21 RX ORDER — PREDNISONE 20 MG/1
20 TABLET ORAL DAILY
Qty: 5 TABLET | Refills: 0 | Status: SHIPPED | OUTPATIENT
Start: 2022-06-21 | End: 2022-06-26

## 2022-06-21 RX ORDER — HYDROCODONE BITARTRATE AND ACETAMINOPHEN 5; 325 MG/1; MG/1
.5-1 TABLET ORAL
Qty: 12 TABLET | Refills: 0 | Status: SHIPPED | OUTPATIENT
Start: 2022-06-21 | End: 2022-06-25

## 2022-06-21 RX ADMIN — PREDNISONE 40 MG: 20 TABLET ORAL at 05:53

## 2022-06-21 RX ADMIN — HYDROCODONE BITARTRATE AND ACETAMINOPHEN 1 TABLET: 7.5; 325 TABLET ORAL at 05:53

## 2022-06-21 NOTE — ED PROVIDER NOTES
EMERGENCY DEPARTMENT HISTORY AND PHYSICAL EXAM      Date: 6/21/2022  Patient Name: Marcy Montgomery      History of Presenting Illness     Chief Complaint   Patient presents with    Hand Swelling       History Provided By: Patient    Location/Duration/Severity/Modifying factors   70-year-old male who presents with acute onset of left-sided wrist pain. The patient reports symptoms started yesterday. Symptoms are rapidly progressing over time. He had a history of gout although never in the wrist in the past.  He denies any falls or trauma. Has not had a fever, chills or night sweats. The pain that starts in the left forearm and extends to the dorsum of the left hand. He denies any elbow or shoulder pain. He reports that it is similar but somewhat worse than previous episodes of gout. He denies any vomiting or diarrhea. He denies any previous surgery or manipulation to the wrist.  Has not been sick recently has not any recent infections or openings to the skin. There are no other complaints, changes, or physical findings at this time. PCP: Mehnaz Riddle MD    Current Outpatient Medications   Medication Sig Dispense Refill    HYDROcodone-acetaminophen (Norco) 5-325 mg per tablet Take 0.5-1 Tablets by mouth every six (6) hours as needed for Pain for up to 4 days. Max Daily Amount: 4 Tablets. 12 Tablet 0    predniSONE (DELTASONE) 20 mg tablet Take 1 Tablet by mouth daily for 5 days. 5 Tablet 0    ondansetron hcl (Zofran) 4 mg tablet Take 1 Tablet by mouth every eight (8) hours as needed for Nausea. 20 Tablet 0    nitroglycerin (NITROSTAT) 0.4 mg SL tablet       ferrous sulfate (IRON) 325 mg (65 mg iron) EC tablet 1 tab(s)      clopidogreL (PLAVIX) 75 mg tab       allopurinoL (ZYLOPRIM) 100 mg tablet Take  by mouth daily.  amLODIPine (NORVASC) 10 mg tablet Take  by mouth daily.  atorvastatin (LIPITOR) 40 mg tablet Take  by mouth daily.       carvediloL (COREG) 25 mg tablet Take 25 mg by mouth two (2) times daily (with meals).  doxazosin (CARDURA) 4 mg tablet Take  by mouth daily.  furosemide (LASIX) 20 mg tablet Take  by mouth daily.  hydrALAZINE (APRESOLINE) 50 mg tablet Take 25 mg by mouth three (3) times daily.  loratadine (CLARITIN) 10 mg tablet Take 10 mg by mouth.  acetaminophen (TYLENOL) 325 mg tablet Take 2 Tabs by mouth every six (6) hours as needed for Pain. 20 Tab 0       Past History     Past Medical History:  Past Medical History:   Diagnosis Date    Arthritis     Chronic constipation     Gout     High cholesterol     Hypertension        Past Surgical History:  Past Surgical History:   Procedure Laterality Date    HX HEENT      tonsillectomy cyst removed from under tongue    HX HERNIA REPAIR      Robot-assisted laparoscopic right inguinal hernia       Family History:  History reviewed. No pertinent family history. Social History:  Social History     Tobacco Use    Smoking status: Former Smoker    Smokeless tobacco: Former User     Quit date: 3/11/1995   Substance Use Topics    Alcohol use: No    Drug use: No       Allergies:  No Known Allergies      Review of Systems     Review of Systems   Constitutional: Negative for fever. HENT: Negative for congestion and rhinorrhea. Eyes: Negative for visual disturbance. Respiratory: Negative for cough and shortness of breath. Cardiovascular: Negative for chest pain. Gastrointestinal: Negative for abdominal pain, diarrhea, nausea and vomiting. Genitourinary: Negative for urgency. Musculoskeletal: Positive for arthralgias. Negative for myalgias. Skin: Negative for rash. Neurological: Negative for headaches. Physical Exam     Physical Exam  Constitutional:       General: He is not in acute distress. Appearance: Normal appearance. He is normal weight. He is not ill-appearing or toxic-appearing.       Comments: Nontoxic appearing, not in any distress   HENT:      Head: Normocephalic and atraumatic. Right Ear: External ear normal.      Left Ear: External ear normal.      Nose: Nose normal.      Mouth/Throat:      Mouth: Mucous membranes are moist.      Pharynx: No oropharyngeal exudate or posterior oropharyngeal erythema. Eyes:      Conjunctiva/sclera: Conjunctivae normal.      Pupils: Pupils are equal, round, and reactive to light. Cardiovascular:      Rate and Rhythm: Normal rate and regular rhythm. Pulses: Normal pulses. Heart sounds: Normal heart sounds. No murmur heard. No friction rub. Pulmonary:      Effort: Pulmonary effort is normal.      Breath sounds: Normal breath sounds. No wheezing, rhonchi or rales. Abdominal:      General: Abdomen is flat. Tenderness: There is no abdominal tenderness. There is no guarding or rebound. Musculoskeletal:         General: Swelling and tenderness present. Normal range of motion. Cervical back: Normal range of motion and neck supple. Right lower leg: No edema. Left lower leg: No edema. Comments: The left wrist is mildly swollen, it is tender on palpation, there is slight erythema over the dorsal aspect. I am able to flex the wrist however extension causes considerable pain. It is not warm to touch. He is able to move all of the fingers without restriction. 2+ radial pulse. Skin:     General: Skin is warm and dry. Capillary Refill: Capillary refill takes less than 2 seconds. Neurological:      General: No focal deficit present. Mental Status: He is alert. Motor: No weakness. Lab and Diagnostic Study Results     Labs -  No results found for this or any previous visit (from the past 24 hour(s)). Radiologic Studies -   XR HAND LT MIN 3 V    (Results Pending)         Medical Decision Making and ED Course   - I am the first and primary provider for this patient AND AM THE PRIMARY PROVIDER OF RECORD.     - I reviewed the vital signs, available nursing notes, past medical history, past surgical history, family history and social history. - Initial assessment performed. The patients presenting problems have been discussed, and the staff are in agreement with the care plan formulated and outlined with them. I have encouraged them to ask questions as they arise throughout their visit. Vital Signs-Reviewed the patient's vital signs. Patient Vitals for the past 12 hrs:   Temp Pulse Resp BP SpO2   06/21/22 0430 98.4 °F (36.9 °C) 79 16 (!) 147/79 98 %       EKG interpretation: none    Records Reviewed: Nursing Notes and Old Medical Records        ED Course:       ED Course as of 06/21/22 0620   Tue Jun 21, 2022   0549 X-ray of the left hand reveals no acute fracture. Formal read is pending. [KARLA]      ED Course User Index  [KARLA] Cirilo Nguyen DO         Provider Notes (Medical Decision Making):     MDM  Number of Diagnoses or Management Options  Acute pain of left wrist  Personal history of gout  Diagnosis management comments: 24-year-old male who presents to the emergency room with complaints of left wrist pain. On exam he does have swelling to left wrist.  The rapidity of onset and degree of pain is most consistent with gout. He has a history of gout in the past although never in the wrist.  I discussed with him that I cannot really rule out a septic arthritis without arthrocentesis although I think that is unlikely. Discussed with him that treatment for gout is somewhat limited in his case due to his chronic kidney disease which is stage V, not on dialysis limiting the ability to colchicine or NSAIDs for treatment. We would go with prednisone although cautioned him the use of prednisone if it is infectious in nature and could potentially make it worse in which case he need to return immediately.   The likelihood at this point of the septic arthritis is low will prescribe her Norco for pain control cautioned him and his wife to try starting with only half of the tablet initially and then graduating to a full tablet if it is not adequate. Will prescribe prednisone to go along with that. Advised to follow-up with a primary care doctor this week for a recheck of the area and to return if he develops a fever, worsening pain, worsening swelling or any changes in his condition in the meantime.     ------------------------------------------------------------------------------------------------------------    At this time, patient is stable and appropriate for discharge home. Patient demonstrates understanding of current diagnoses and is in agreement with the treatment plan. They are advised that while the likelihood of serious underlying condition is low at this point given the evaluation performed today, we cannot fully rule it out. They are advised to immediately return with any new symptoms or worsening of current condition. Any Incidental findings were noted on the patient's discharge paperwork as well as verbally directly to the patient, and the appropriate follow-up was given to the patient as far as instructions on testing needed as well as the timeframe. All questions have been answered. Patient is given educational material regarding their diagnoses, including danger symptoms and when to return to the ED. This note was dictated utilizing Dragon voice recognition software. Unfortunately this leads to occasional typographical errors. I apologize in advance if the situation occurs. If questions occur please do not hesitate to contact me directly. Fidencio Almanzar DO        Consultations:       Consultations: - NONE        Procedures and Critical Care       Performed by: Fidencio Almanzar DO    Procedures             CRITICAL CARE NOTE :  6:15 AM  CRITICAL CARE TIME: None    Fidencio Almanzar DO        Disposition         Diagnosis:   1. Acute pain of left wrist    2.  Personal history of gout          Disposition: Discharge    Follow-up Information     Follow up With Specialties Details Why Contact Info    Gerhard García MD Family Medicine Call today  [address] [place] [phone]      [id number] [facility] EMERGENCY DEPT Emergency Medicine  As needed, If symptoms worsen; or [facility] [address] [facility]  [phone]          Discharge Medication List as of 6/21/2022  6:09 AM      START taking these medications    Details   HYDROcodone-acetaminophen (Norco) 5-325 mg per tablet Take 0.5-1 Tablets by mouth every six (6) hours as needed for Pain for up to 4 days. Max Daily Amount: 4 Tablets., Normal, Disp-12 Tablet, R-0      predniSONE (DELTASONE) 20 mg tablet Take 1 Tablet by mouth daily for 5 days. , Normal, Disp-5 Tablet, R-0         CONTINUE these medications which have NOT CHANGED    Details   ondansetron hcl (Zofran) 4 mg tablet Take 1 Tablet by mouth every eight (8) hours as needed for Nausea., Normal, Disp-20 Tablet, R-0      nitroglycerin (NITROSTAT) 0.4 mg SL tablet Historical Med      ferrous sulfate (IRON) 325 mg (65 mg iron) EC tablet 1 tab(s), Historical Med      clopidogreL (PLAVIX) 75 mg tab Historical Med      allopurinoL (ZYLOPRIM) 100 mg tablet Take  by mouth daily. , Historical Med      amLODIPine (NORVASC) 10 mg tablet Take  by mouth daily. , Historical Med      atorvastatin (LIPITOR) 40 mg tablet Take  by mouth daily. , Historical Med      carvediloL (COREG) 25 mg tablet Take 25 mg by mouth two (2) times daily (with meals). , Historical Med      doxazosin (CARDURA) 4 mg tablet Take  by mouth daily. , Historical Med      furosemide (LASIX) 20 mg tablet Take  by mouth daily. , Historical Med      hydrALAZINE (APRESOLINE) 50 mg tablet Take 25 mg by mouth three (3) times daily. , Historical Med      loratadine (CLARITIN) 10 mg tablet Take 10 mg by mouth., Historical Med      acetaminophen (TYLENOL) 325 mg tablet Take 2 Tabs by mouth every six (6) hours as needed for Pain., Print, Disp-20 Tab, R-0 Diagnosis     Clinical Impression:   1. Acute pain of left wrist    2. Personal history of gout        Attestations:    Danette Lowery, DO    Please note that this dictation was completed with [employer], the computer voice recognition software. Quite often unanticipated grammatical, syntax, homophones, and other interpretive errors are inadvertently transcribed by the computer software. Please disregard these errors. Please excuse any errors that have escaped final proofreading. Thank you.

## 2022-06-21 NOTE — ED TRIAGE NOTES
Patient states he started with pain and swelling to his left hand and wrist yesterday. Patient took motrin and tylenol as well as tramadol and pain did not subside. Per spouse, patient has history of gout.

## 2022-06-21 NOTE — DISCHARGE INSTRUCTIONS
1.  I would expect the pain to start to improve over the course of 12 to 24 hours after starting the medication which we gave you here. If it is worsening over that time or does not improve you should have it rechecked. Administration of the steroids would potentially make an infection worse in the wrist.  I suspect this is likely gout which should respond to the steroids however if it is worsening in that time you need to return for recheck. Likewise if you develop a fever or worsening pain despite the medication you should return for recheck. 2.  Follow-up with your primary care doctor in by the end of the week for recheck and for additional testing and management. 3.  Its been a pleasure taking care of you if you are worsening at any point please return for recheck. 4.  I have prescribed you a stronger pain medication called Norco.  I recommend you start by taking only a half of a tablet and see how you respond to it initially, if it is not adequately relieving her pain take the other half. It is better to start slow than to overdo it and cause excessive sedation. 5.  Likewise, you received your first dose of the prednisone here in the emergency department I would recommend you take your next dose this evening with dinner, however you should only take it once per day typically.

## 2022-08-11 ENCOUNTER — APPOINTMENT (OUTPATIENT)
Dept: CT IMAGING | Age: 82
End: 2022-08-11
Attending: EMERGENCY MEDICINE
Payer: MEDICARE

## 2022-08-11 ENCOUNTER — HOSPITAL ENCOUNTER (EMERGENCY)
Age: 82
Discharge: HOME | End: 2022-08-11
Attending: EMERGENCY MEDICINE
Payer: MEDICARE

## 2022-08-11 VITALS
WEIGHT: 137 LBS | OXYGEN SATURATION: 100 % | DIASTOLIC BLOOD PRESSURE: 74 MMHG | HEART RATE: 93 BPM | RESPIRATION RATE: 18 BRPM | BODY MASS INDEX: 22.02 KG/M2 | HEIGHT: 66 IN | TEMPERATURE: 98.1 F | SYSTOLIC BLOOD PRESSURE: 129 MMHG

## 2022-08-11 DIAGNOSIS — K57.92 DIVERTICULITIS: Primary | ICD-10-CM

## 2022-08-11 DIAGNOSIS — D64.9 CHRONIC ANEMIA: ICD-10-CM

## 2022-08-11 DIAGNOSIS — K86.9 LESION OF PANCREAS: ICD-10-CM

## 2022-08-11 DIAGNOSIS — N18.9 CHRONIC KIDNEY DISEASE, UNSPECIFIED CKD STAGE: ICD-10-CM

## 2022-08-11 LAB
ALBUMIN SERPL-MCNC: 3.1 G/DL (ref 3.4–5)
ALBUMIN/GLOB SERPL: 0.8 {RATIO} (ref 0.8–1.7)
ALP SERPL-CCNC: 70 U/L (ref 45–117)
ALT SERPL-CCNC: 35 U/L (ref 16–61)
AMORPH CRY URNS QL MICRO: ABNORMAL
ANION GAP SERPL CALC-SCNC: 9 MMOL/L (ref 3–18)
APPEARANCE UR: CLEAR
AST SERPL-CCNC: 44 U/L (ref 10–38)
BACTERIA URNS QL MICRO: NEGATIVE /HPF
BASOPHILS # BLD: 0 K/UL (ref 0–0.1)
BASOPHILS NFR BLD: 0 % (ref 0–2)
BILIRUB SERPL-MCNC: 0.9 MG/DL (ref 0.2–1)
BILIRUB UR QL: NEGATIVE
BUN SERPL-MCNC: 102 MG/DL (ref 7–18)
BUN/CREAT SERPL: 23 (ref 12–20)
CALCIUM SERPL-MCNC: 9.8 MG/DL (ref 8.5–10.1)
CHLORIDE SERPL-SCNC: 102 MMOL/L (ref 100–111)
CO2 SERPL-SCNC: 26 MMOL/L (ref 21–32)
COLOR UR: YELLOW
CREAT SERPL-MCNC: 4.47 MG/DL (ref 0.6–1.3)
DIFFERENTIAL METHOD BLD: ABNORMAL
EOSINOPHIL # BLD: 0 K/UL (ref 0–0.4)
EOSINOPHIL NFR BLD: 0 % (ref 0–5)
EPITH CASTS URNS QL MICRO: NEGATIVE /LPF (ref 0–5)
ERYTHROCYTE [DISTWIDTH] IN BLOOD BY AUTOMATED COUNT: 15.1 % (ref 11.6–14.5)
GLOBULIN SER CALC-MCNC: 4 G/DL (ref 2–4)
GLUCOSE SERPL-MCNC: 155 MG/DL (ref 74–99)
GLUCOSE UR STRIP.AUTO-MCNC: NEGATIVE MG/DL
HCT VFR BLD AUTO: 29.1 % (ref 36–48)
HGB BLD-MCNC: 9.6 G/DL (ref 13–16)
HGB UR QL STRIP: NEGATIVE
IMM GRANULOCYTES # BLD AUTO: 0 K/UL (ref 0–0.04)
IMM GRANULOCYTES NFR BLD AUTO: 0 % (ref 0–0.5)
KETONES UR QL STRIP.AUTO: NEGATIVE MG/DL
LACTATE BLD-SCNC: 0.79 MMOL/L (ref 0.4–2)
LEUKOCYTE ESTERASE UR QL STRIP.AUTO: NEGATIVE
LIPASE SERPL-CCNC: 390 U/L (ref 73–393)
LYMPHOCYTES # BLD: 0.7 K/UL (ref 0.9–3.6)
LYMPHOCYTES NFR BLD: 6 % (ref 21–52)
MCH RBC QN AUTO: 28.8 PG (ref 24–34)
MCHC RBC AUTO-ENTMCNC: 33 G/DL (ref 31–37)
MCV RBC AUTO: 87.4 FL (ref 78–100)
MONOCYTES # BLD: 0.9 K/UL (ref 0.05–1.2)
MONOCYTES NFR BLD: 7 % (ref 3–10)
NEUTS SEG # BLD: 10.5 K/UL (ref 1.8–8)
NEUTS SEG NFR BLD: 86 % (ref 40–73)
NITRITE UR QL STRIP.AUTO: NEGATIVE
NRBC # BLD: 0 K/UL (ref 0–0.01)
NRBC BLD-RTO: 0 PER 100 WBC
PH UR STRIP: 5 [PH] (ref 5–8)
PLATELET # BLD AUTO: 166 K/UL (ref 135–420)
PMV BLD AUTO: 10.3 FL (ref 9.2–11.8)
POTASSIUM SERPL-SCNC: 3.3 MMOL/L (ref 3.5–5.5)
PROT SERPL-MCNC: 7.1 G/DL (ref 6.4–8.2)
PROT UR STRIP-MCNC: ABNORMAL MG/DL
RBC # BLD AUTO: 3.33 M/UL (ref 4.35–5.65)
RBC #/AREA URNS HPF: NEGATIVE /HPF (ref 0–5)
SODIUM SERPL-SCNC: 137 MMOL/L (ref 136–145)
SP GR UR REFRACTOMETRY: 1.01 (ref 1–1.03)
UROBILINOGEN UR QL STRIP.AUTO: 0.2 EU/DL (ref 0.2–1)
WBC # BLD AUTO: 12.3 K/UL (ref 4.6–13.2)
WBC URNS QL MICRO: ABNORMAL /HPF (ref 0–4)

## 2022-08-11 PROCEDURE — 83605 ASSAY OF LACTIC ACID: CPT

## 2022-08-11 PROCEDURE — 85025 COMPLETE CBC W/AUTO DIFF WBC: CPT

## 2022-08-11 PROCEDURE — 74011250636 HC RX REV CODE- 250/636: Performed by: EMERGENCY MEDICINE

## 2022-08-11 PROCEDURE — 83690 ASSAY OF LIPASE: CPT

## 2022-08-11 PROCEDURE — 99284 EMERGENCY DEPT VISIT MOD MDM: CPT

## 2022-08-11 PROCEDURE — 80053 COMPREHEN METABOLIC PANEL: CPT

## 2022-08-11 PROCEDURE — 81001 URINALYSIS AUTO W/SCOPE: CPT

## 2022-08-11 PROCEDURE — 74176 CT ABD & PELVIS W/O CONTRAST: CPT

## 2022-08-11 RX ORDER — AMOXICILLIN AND CLAVULANATE POTASSIUM 250; 125 MG/1; MG/1
1 TABLET, FILM COATED ORAL EVERY 8 HOURS
Status: DISCONTINUED | OUTPATIENT
Start: 2022-08-11 | End: 2022-08-11 | Stop reason: RX

## 2022-08-11 RX ORDER — AMOXICILLIN AND CLAVULANATE POTASSIUM 250; 125 MG/1; MG/1
1 TABLET, FILM COATED ORAL 2 TIMES DAILY
Qty: 14 TABLET | Refills: 0 | Status: SHIPPED | OUTPATIENT
Start: 2022-08-11 | End: 2022-08-18

## 2022-08-11 RX ADMIN — SODIUM CHLORIDE 1000 ML: 900 INJECTION, SOLUTION INTRAVENOUS at 06:52

## 2022-08-11 NOTE — ED TRIAGE NOTES
Patient A/O x 4, presented to the ED with complaint of abdominal pain/cramping x couple of days. Patient states he received a prescription from PCP for doxycycline for colitis. Patient states he took first dose x today. Patient denies vomiting, diarrhea, fever or other complaints.

## 2022-08-11 NOTE — ED PROVIDER NOTES
EMERGENCY DEPARTMENT HISTORY AND PHYSICAL EXAM    6:28 AM      Date: 8/11/2022  Patient Name: Edith Hammans    History of Presenting Illness     Chief Complaint   Patient presents with    Abdominal Pain         History Provided By: Patient  Location/Duration/Severity/Modifying factors   80year-old gentleman with history of hypertension, hyperlipidemia, previous hernia repair, states he has a history of \"colitis\" presents with complaints of abdominal pain, somewhat sharp in nature, mostly localized to the left abdomen. He notes symptom onset about 1 to 2 days ago. He brings in a prescription bottle for doxycycline which he had obtained for previous bouts of colitis. He denies any vomiting, denies diarrhea. He states he has not had a bowel movement this morning, he denies any dysuria. He does note that pain radiates from the left abdomen towards the left inguinal canal and towards the left scrotum, also exacerbated with coughing. He denies any scrotal swelling, dysuria, hematuria. He denies any chest pain or shortness of breath, denies fever, cough, URI symptoms. Abdominal Pain   Pertinent negatives include no fever, no diarrhea, no nausea, no vomiting, no dysuria, no hematuria and no chest pain. PCP: Bismark Valenzuela MD    Current Outpatient Medications   Medication Sig Dispense Refill    amoxicillin-clavulanate (AUGMENTIN) 250-125 mg per tablet Take 1 Tablet by mouth two (2) times a day for 7 days. Indications: diverticulitis 14 Tablet 0    ondansetron hcl (Zofran) 4 mg tablet Take 1 Tablet by mouth every eight (8) hours as needed for Nausea. 20 Tablet 0    nitroglycerin (NITROSTAT) 0.4 mg SL tablet       ferrous sulfate (IRON) 325 mg (65 mg iron) EC tablet 1 tab(s)      clopidogreL (PLAVIX) 75 mg tab       allopurinoL (ZYLOPRIM) 100 mg tablet Take  by mouth daily.  amLODIPine (NORVASC) 10 mg tablet Take  by mouth daily.  atorvastatin (LIPITOR) 40 mg tablet Take  by mouth daily.  carvediloL (COREG) 25 mg tablet Take 25 mg by mouth two (2) times daily (with meals).  doxazosin (CARDURA) 4 mg tablet Take  by mouth daily.  furosemide (LASIX) 20 mg tablet Take  by mouth daily.  hydrALAZINE (APRESOLINE) 50 mg tablet Take 25 mg by mouth three (3) times daily.  loratadine (CLARITIN) 10 mg tablet Take 10 mg by mouth.  acetaminophen (TYLENOL) 325 mg tablet Take 2 Tabs by mouth every six (6) hours as needed for Pain. 20 Tab 0       Past History     Past Medical History:  Past Medical History:   Diagnosis Date    Arthritis     Chronic constipation     Gout     High cholesterol     Hypertension        Past Surgical History:  Past Surgical History:   Procedure Laterality Date    HX HEENT      tonsillectomy cyst removed from under tongue    HX HERNIA REPAIR      Robot-assisted laparoscopic right inguinal hernia       Family History:  No family history on file. Social History:  Social History     Tobacco Use    Smoking status: Former    Smokeless tobacco: Former     Quit date: 3/11/1995   Substance Use Topics    Alcohol use: No    Drug use: No       Allergies:  No Known Allergies      Review of Systems       Review of Systems   Constitutional:  Negative for fever. HENT:  Negative for congestion and sore throat. Eyes:  Negative for redness. Respiratory:  Negative for shortness of breath. Cardiovascular:  Negative for chest pain. Gastrointestinal:  Positive for abdominal pain. Negative for diarrhea, nausea and vomiting. Genitourinary:  Negative for dysuria and hematuria. Skin:  Negative for color change. Allergic/Immunologic: Negative for immunocompromised state. Neurological:  Negative for syncope. Psychiatric/Behavioral:  Negative for confusion.         Physical Exam   Visit Vitals  /74   Pulse 93   Temp 98.1 °F (36.7 °C)   Resp 18   Ht 5' 6\" (1.676 m)   Wt 62.1 kg (137 lb)   SpO2 100%   BMI 22.11 kg/m²         Physical Exam  Nursing note and vitals reviewed. General appearance: non-toxic, no distress  Eyes:  EOMI, anicteric sclera  HEENT: NCAT, face mask in place  Pulmonary: clear to auscultation bilaterally  Cardiac: normal rate and regular rhythm with 2 out of 6 systolic murmur noted at the right left upper sternal border, 1+DP pulses, 2+ radial pulses  Abdomen: soft, palpation most notable in the left lower quadrant and suprapubic region, nondistended, not rigid, no CVA tenderness  : Testes nontender, no scrotal masses, no edema. Minimal tenderness with palpation of the left inguinal canal.  No mass appreciable with cough. MSK: no pre-tibial edema  Neuro: Alert, answers questions appropriately  Skin: capillary refill brisk      Diagnostic Study Results     Labs -  Recent Results (from the past 12 hour(s))   CBC WITH AUTOMATED DIFF    Collection Time: 08/11/22  6:30 AM   Result Value Ref Range    WBC 12.3 4.6 - 13.2 K/uL    RBC 3.33 (L) 4.35 - 5.65 M/uL    HGB 9.6 (L) 13.0 - 16.0 g/dL    HCT 29.1 (L) 36.0 - 48.0 %    MCV 87.4 78.0 - 100.0 FL    MCH 28.8 24.0 - 34.0 PG    MCHC 33.0 31.0 - 37.0 g/dL    RDW 15.1 (H) 11.6 - 14.5 %    PLATELET 440 988 - 405 K/uL    MPV 10.3 9.2 - 11.8 FL    NRBC 0.0 0  WBC    ABSOLUTE NRBC 0.00 0.00 - 0.01 K/uL    NEUTROPHILS 86 (H) 40 - 73 %    LYMPHOCYTES 6 (L) 21 - 52 %    MONOCYTES 7 3 - 10 %    EOSINOPHILS 0 0 - 5 %    BASOPHILS 0 0 - 2 %    IMMATURE GRANULOCYTES 0 0.0 - 0.5 %    ABS. NEUTROPHILS 10.5 (H) 1.8 - 8.0 K/UL    ABS. LYMPHOCYTES 0.7 (L) 0.9 - 3.6 K/UL    ABS. MONOCYTES 0.9 0.05 - 1.2 K/UL    ABS. EOSINOPHILS 0.0 0.0 - 0.4 K/UL    ABS. BASOPHILS 0.0 0.0 - 0.1 K/UL    ABS. IMM.  GRANS. 0.0 0.00 - 0.04 K/UL    DF AUTOMATED     METABOLIC PANEL, COMPREHENSIVE    Collection Time: 08/11/22  6:30 AM   Result Value Ref Range    Sodium 137 136 - 145 mmol/L    Potassium 3.3 (L) 3.5 - 5.5 mmol/L    Chloride 102 100 - 111 mmol/L    CO2 26 21 - 32 mmol/L    Anion gap 9 3.0 - 18 mmol/L    Glucose 155 (H) 74 - 99 mg/dL     (H) 7.0 - 18 MG/DL    Creatinine 4.47 (H) 0.6 - 1.3 MG/DL    BUN/Creatinine ratio 23 (H) 12 - 20      GFR est AA 15 (L) >60 ml/min/1.73m2    GFR est non-AA 13 (L) >60 ml/min/1.73m2    Calcium 9.8 8.5 - 10.1 MG/DL    Bilirubin, total 0.9 0.2 - 1.0 MG/DL    ALT (SGPT) 35 16 - 61 U/L    AST (SGOT) 44 (H) 10 - 38 U/L    Alk. phosphatase 70 45 - 117 U/L    Protein, total 7.1 6.4 - 8.2 g/dL    Albumin 3.1 (L) 3.4 - 5.0 g/dL    Globulin 4.0 2.0 - 4.0 g/dL    A-G Ratio 0.8 0.8 - 1.7     LIPASE    Collection Time: 08/11/22  6:30 AM   Result Value Ref Range    Lipase 390 73 - 393 U/L   POC LACTIC ACID    Collection Time: 08/11/22  6:32 AM   Result Value Ref Range    Lactic Acid (POC) 0.79 0.40 - 2.00 mmol/L   URINALYSIS W/ RFLX MICROSCOPIC    Collection Time: 08/11/22  7:57 AM   Result Value Ref Range    Color YELLOW      Appearance CLEAR      Specific gravity 1.011 1.005 - 1.030      pH (UA) 5.0 5.0 - 8.0      Protein TRACE (A) NEG mg/dL    Glucose Negative NEG mg/dL    Ketone Negative NEG mg/dL    Bilirubin Negative NEG      Blood Negative NEG      Urobilinogen 0.2 0.2 - 1.0 EU/dL    Nitrites Negative NEG      Leukocyte Esterase Negative NEG     URINE MICROSCOPIC ONLY    Collection Time: 08/11/22  7:57 AM   Result Value Ref Range    WBC 0 to 3 0 - 4 /hpf    RBC Negative 0 - 5 /hpf    Epithelial cells Negative 0 - 5 /lpf    Bacteria Negative NEG /hpf    Amorphous Crystals 1+ (A) NEG       Radiologic Studies -   CT ABD PELV WO CONT   Final Result   :      1. Nothing definitively acute. Low-grade left lower quadrant diverticulitis is   possible. No perforation or abscess, and no obstruction or ileus. 2. Small cystic hypodensity at the pancreatic head is unchanged in the short   interval. Reference discussion of abdominal MRI April 2022. Surveillance   recommended on that examination should suffice for this focus.                   Medical Decision Making   I am the first provider for this patient. I reviewed the vital signs, available nursing notes, past medical history, past surgical history, family history and social history. Vital Signs-Reviewed the patient's vital signs. Records Reviewed: Old Medical Records (Time of Review: 6:28 AM)    ED Course: Progress Notes, Reevaluation, and Consults:         Provider Notes (Medical Decision Making):   MDM  Number of Diagnoses or Management Options  Chronic anemia  Chronic kidney disease, unspecified CKD stage  Diverticulitis  Lesion of pancreas  Diagnosis management comments: 72-year-old gentleman presents with complaints of left-sided abdominal pain, reports a history of \"colitis. \"  He just darted a prescription of doxycycline, denies diarrhea but notes pain with coughing. He demonstrates tenderness on examination, no peritoneal signs. Given his advanced age, per review of the EMR appears to have CKD, will check CT abdomen pelvis without contrast.  No thoracic pain, no respiratory symptoms, no concern for cardiopulmonary etiology at this point. Consider diverticular disease, colitis, hernia, lower suspicion for consider pancreatitis. Procedures    Critical Care Time: 0      Diagnosis     Clinical Impression:   1. Diverticulitis    2. Chronic kidney disease, unspecified CKD stage    3. Chronic anemia    4. Lesion of pancreas        Disposition: discharge    Follow-up Information       Follow up With Specialties Details Why Contact Info    Bismark Valenzuela MD Family Medicine Schedule an appointment as soon as possible for a visit in 1 week For reevaluation of your symptoms, as well as to monitor your anemia, kidney function, and the chronic lesion on your pancreas.  [address] [place] [address]  [address]  EMERGENCY DEPT Emergency Medicine Go in 1 day If symptoms worsen [address] [facility]  [phone]             Patient's Medications   Start Taking    AMOXICILLIN-CLAVULANATE (AUGMENTIN) 250-125 MG PER TABLET    Take 1 Tablet by mouth two (2) times a day for 7 days. Indications: diverticulitis   Continue Taking    ACETAMINOPHEN (TYLENOL) 325 MG TABLET    Take 2 Tabs by mouth every six (6) hours as needed for Pain. ALLOPURINOL (ZYLOPRIM) 100 MG TABLET    Take  by mouth daily. AMLODIPINE (NORVASC) 10 MG TABLET    Take  by mouth daily. ATORVASTATIN (LIPITOR) 40 MG TABLET    Take  by mouth daily. CARVEDILOL (COREG) 25 MG TABLET    Take 25 mg by mouth two (2) times daily (with meals). CLOPIDOGREL (PLAVIX) 75 MG TAB        DOXAZOSIN (CARDURA) 4 MG TABLET    Take  by mouth daily. FERROUS SULFATE (IRON) 325 MG (65 MG IRON) EC TABLET    1 tab(s)    FUROSEMIDE (LASIX) 20 MG TABLET    Take  by mouth daily. HYDRALAZINE (APRESOLINE) 50 MG TABLET    Take 25 mg by mouth three (3) times daily. LORATADINE (CLARITIN) 10 MG TABLET    Take 10 mg by mouth. NITROGLYCERIN (NITROSTAT) 0.4 MG SL TABLET        ONDANSETRON HCL (ZOFRAN) 4 MG TABLET    Take 1 Tablet by mouth every eight (8) hours as needed for Nausea. These Medications have changed    No medications on file   Stop Taking    No medications on file     Disclaimer: Sections of this note are dictated using utilizing voice recognition software. Minor typographical errors may be present. If questions arise, please do not hesitate to contact me or call our department.

## 2022-09-02 ENCOUNTER — TRANSCRIBE ORDER (OUTPATIENT)
Dept: SCHEDULING | Age: 82
End: 2022-09-02

## 2022-09-02 DIAGNOSIS — I50.9 CHF (CONGESTIVE HEART FAILURE) (HCC): Primary | ICD-10-CM

## 2022-09-02 DIAGNOSIS — I42.9 CARDIOMYOPATHY (HCC): Primary | ICD-10-CM

## 2022-09-16 ENCOUNTER — HOSPITAL ENCOUNTER (OUTPATIENT)
Dept: LAB | Age: 82
Discharge: HOME | End: 2022-09-16
Payer: MEDICARE

## 2022-09-16 LAB
25(OH)D3 SERPL-MCNC: 57.6 NG/ML (ref 30–100)
ALBUMIN SERPL-MCNC: 3.3 G/DL (ref 3.4–5)
ANION GAP SERPL CALC-SCNC: 8 MMOL/L (ref 3–18)
ANION GAP SERPL CALC-SCNC: 9 MMOL/L (ref 3–18)
BUN SERPL-MCNC: 97 MG/DL (ref 7–18)
BUN SERPL-MCNC: 97 MG/DL (ref 7–18)
BUN/CREAT SERPL: 24 (ref 12–20)
BUN/CREAT SERPL: 24 (ref 12–20)
CALCIUM SERPL-MCNC: 9 MG/DL (ref 8.5–10.1)
CALCIUM SERPL-MCNC: 9 MG/DL (ref 8.5–10.1)
CALCIUM SERPL-MCNC: 9.2 MG/DL (ref 8.5–10.1)
CHLORIDE SERPL-SCNC: 104 MMOL/L (ref 100–111)
CHLORIDE SERPL-SCNC: 105 MMOL/L (ref 100–111)
CO2 SERPL-SCNC: 28 MMOL/L (ref 21–32)
CO2 SERPL-SCNC: 28 MMOL/L (ref 21–32)
CREAT SERPL-MCNC: 4.08 MG/DL (ref 0.6–1.3)
CREAT SERPL-MCNC: 4.1 MG/DL (ref 0.6–1.3)
CREAT UR-MCNC: 71 MG/DL (ref 30–125)
ERYTHROCYTE [DISTWIDTH] IN BLOOD BY AUTOMATED COUNT: 15.8 % (ref 11.6–14.5)
GLUCOSE SERPL-MCNC: 123 MG/DL (ref 74–99)
GLUCOSE SERPL-MCNC: 123 MG/DL (ref 74–99)
HCT VFR BLD AUTO: 31.1 % (ref 36–48)
HGB BLD-MCNC: 9.5 G/DL (ref 13–16)
MCH RBC QN AUTO: 27.5 PG (ref 24–34)
MCHC RBC AUTO-ENTMCNC: 30.5 G/DL (ref 31–37)
MCV RBC AUTO: 90.1 FL (ref 78–100)
NRBC # BLD: 0 K/UL (ref 0–0.01)
NRBC BLD-RTO: 0 PER 100 WBC
PHOSPHATE SERPL-MCNC: 3.3 MG/DL (ref 2.5–4.9)
PLATELET # BLD AUTO: 156 K/UL (ref 135–420)
PMV BLD AUTO: 11.3 FL (ref 9.2–11.8)
POTASSIUM SERPL-SCNC: 4 MMOL/L (ref 3.5–5.5)
POTASSIUM SERPL-SCNC: 4 MMOL/L (ref 3.5–5.5)
PROT UR-MCNC: 18 MG/DL
PTH-INTACT SERPL-MCNC: 164 PG/ML (ref 18.4–88)
RBC # BLD AUTO: 3.45 M/UL (ref 4.35–5.65)
SODIUM SERPL-SCNC: 141 MMOL/L (ref 136–145)
SODIUM SERPL-SCNC: 141 MMOL/L (ref 136–145)
WBC # BLD AUTO: 6.1 K/UL (ref 4.6–13.2)

## 2022-09-16 PROCEDURE — 80048 BASIC METABOLIC PNL TOTAL CA: CPT

## 2022-09-16 PROCEDURE — 85027 COMPLETE CBC AUTOMATED: CPT

## 2022-09-16 PROCEDURE — 82306 VITAMIN D 25 HYDROXY: CPT

## 2022-09-16 PROCEDURE — 80069 RENAL FUNCTION PANEL: CPT

## 2022-09-16 PROCEDURE — 84156 ASSAY OF PROTEIN URINE: CPT

## 2022-09-16 PROCEDURE — 83970 ASSAY OF PARATHORMONE: CPT

## 2022-09-16 PROCEDURE — 82570 ASSAY OF URINE CREATININE: CPT

## 2022-09-16 PROCEDURE — 36415 COLL VENOUS BLD VENIPUNCTURE: CPT

## 2022-10-22 ENCOUNTER — HOSPITAL ENCOUNTER (EMERGENCY)
Age: 82
Discharge: HOME | End: 2022-10-22
Attending: EMERGENCY MEDICINE
Payer: MEDICARE

## 2022-10-22 VITALS
DIASTOLIC BLOOD PRESSURE: 83 MMHG | RESPIRATION RATE: 16 BRPM | BODY MASS INDEX: 21.86 KG/M2 | SYSTOLIC BLOOD PRESSURE: 142 MMHG | WEIGHT: 136 LBS | OXYGEN SATURATION: 100 % | HEART RATE: 64 BPM | TEMPERATURE: 98.6 F | HEIGHT: 66 IN

## 2022-10-22 DIAGNOSIS — M10.9 ACUTE GOUT OF LEFT HAND, UNSPECIFIED CAUSE: Primary | ICD-10-CM

## 2022-10-22 PROCEDURE — 74011250637 HC RX REV CODE- 250/637: Performed by: EMERGENCY MEDICINE

## 2022-10-22 PROCEDURE — 99283 EMERGENCY DEPT VISIT LOW MDM: CPT

## 2022-10-22 PROCEDURE — 74011636637 HC RX REV CODE- 636/637: Performed by: EMERGENCY MEDICINE

## 2022-10-22 RX ORDER — HYDROCODONE BITARTRATE AND ACETAMINOPHEN 5; 325 MG/1; MG/1
1 TABLET ORAL
Qty: 4 TABLET | Refills: 0 | Status: SHIPPED | OUTPATIENT
Start: 2022-10-22 | End: 2022-10-25

## 2022-10-22 RX ORDER — PREDNISONE 20 MG/1
60 TABLET ORAL
Status: COMPLETED | OUTPATIENT
Start: 2022-10-22 | End: 2022-10-22

## 2022-10-22 RX ORDER — HYDROCODONE BITARTRATE AND ACETAMINOPHEN 5; 325 MG/1; MG/1
1 TABLET ORAL
Status: COMPLETED | OUTPATIENT
Start: 2022-10-22 | End: 2022-10-22

## 2022-10-22 RX ORDER — PREDNISONE 10 MG/1
TABLET ORAL
Qty: 1 DOSE PACK | Refills: 0 | Status: SHIPPED | OUTPATIENT
Start: 2022-10-23

## 2022-10-22 RX ADMIN — HYDROCODONE BITARTRATE AND ACETAMINOPHEN 1 TABLET: 5; 325 TABLET ORAL at 10:48

## 2022-10-22 RX ADMIN — PREDNISONE 60 MG: 20 TABLET ORAL at 10:48

## 2022-10-22 NOTE — ED PROVIDER NOTES
EMERGENCY DEPARTMENT HISTORY AND PHYSICAL EXAM    10:39 AM      Date: 10/22/2022  Patient Name: Mika Diaz    History of Presenting Illness     Chief Complaint   Patient presents with    Arm Pain     Arm pain started yesterday and all night,  this am it is warm and swollen. Patient states that he has had this before when he ate fish = gout, but he has not eaten any fish         History Provided By: Patient  Location/Duration/Severity/Modifying factors   The patient is an 79yo male with a history of HTN, OA, dyslipidemia, and gout that presents with 1 day of left wrist pain that feels similar to his gout in the past.  He was given hydrocodone and prednisone in the past with improvement tried some tramadol this morning without improvement. Patient denies any fevers, chills, and has no history of diabetes. Patient denies radiation of pain. The patient is not a current smoker, drinker, or drug user. PCP: Kiley Gomez MD    Current Outpatient Medications   Medication Sig Dispense Refill    clopidogreL (PLAVIX) 75 mg tab       allopurinoL (ZYLOPRIM) 100 mg tablet Take  by mouth daily. amLODIPine (NORVASC) 10 mg tablet Take  by mouth daily. atorvastatin (LIPITOR) 40 mg tablet Take  by mouth daily. carvediloL (COREG) 25 mg tablet Take 25 mg by mouth two (2) times daily (with meals). doxazosin (CARDURA) 4 mg tablet Take  by mouth daily. furosemide (LASIX) 20 mg tablet Take  by mouth daily. hydrALAZINE (APRESOLINE) 50 mg tablet Take 25 mg by mouth three (3) times daily. ondansetron hcl (Zofran) 4 mg tablet Take 1 Tablet by mouth every eight (8) hours as needed for Nausea. (Patient not taking: Reported on 10/22/2022) 20 Tablet 0    nitroglycerin (NITROSTAT) 0.4 mg SL tablet       ferrous sulfate (IRON) 325 mg (65 mg iron) EC tablet 1 tab(s) (Patient not taking: Reported on 10/22/2022)      loratadine (CLARITIN) 10 mg tablet Take 10 mg by mouth.  (Patient not taking: Reported on 10/22/2022)      acetaminophen (TYLENOL) 325 mg tablet Take 2 Tabs by mouth every six (6) hours as needed for Pain. (Patient not taking: Reported on 10/22/2022) 20 Tab 0       Past History     Past Medical History:  Past Medical History:   Diagnosis Date    Arthritis     Chronic constipation     Gout     High cholesterol     Hypertension        Past Surgical History:  Past Surgical History:   Procedure Laterality Date    HX HEENT      tonsillectomy cyst removed from under tongue    HX HERNIA REPAIR      Robot-assisted laparoscopic right inguinal hernia       Family History:  No family history on file. Social History:  Social History     Tobacco Use    Smoking status: Former    Smokeless tobacco: Former     Quit date: 3/11/1995   Substance Use Topics    Alcohol use: No    Drug use: No       Allergies:  No Known Allergies      Review of Systems       Review of Systems   Constitutional:  Negative for activity change, fatigue and fever. HENT:  Negative for congestion and rhinorrhea. Eyes:  Negative for visual disturbance. Respiratory:  Negative for shortness of breath. Cardiovascular:  Negative for chest pain and palpitations. Gastrointestinal:  Negative for abdominal pain, diarrhea, nausea and vomiting. Genitourinary:  Negative for dysuria and hematuria. Musculoskeletal:  Positive for arthralgias. Negative for back pain. Skin:  Negative for rash. Neurological:  Negative for dizziness, weakness and light-headedness. All other systems reviewed and are negative. Physical Exam   Visit Vitals  BP (!) 142/83 (BP 1 Location: Left upper arm, BP Patient Position: At rest)   Pulse 64   Temp 98.6 °F (37 °C)   Resp 16   Ht 5' 6\" (1.676 m)   Wt 61.7 kg (136 lb)   SpO2 100%   BMI 21.95 kg/m²         Physical Exam  Vitals and nursing note reviewed. Constitutional:       General: He is not in acute distress. Appearance: He is well-developed.    HENT:      Head: Normocephalic and atraumatic. Right Ear: External ear normal.      Left Ear: External ear normal.      Nose: Nose normal.   Eyes:      General: No scleral icterus. Conjunctiva/sclera: Conjunctivae normal.      Pupils: Pupils are equal, round, and reactive to light. Neck:      Thyroid: No thyromegaly. Vascular: No JVD. Trachea: No tracheal deviation. Cardiovascular:      Rate and Rhythm: Normal rate and regular rhythm. Heart sounds: Normal heart sounds. No murmur heard. No friction rub. No gallop. Pulmonary:      Effort: Pulmonary effort is normal.      Breath sounds: Normal breath sounds. Chest:      Chest wall: No tenderness. Abdominal:      General: Bowel sounds are normal. There is no distension. Palpations: Abdomen is soft. Tenderness: There is no abdominal tenderness. There is no guarding or rebound. Musculoskeletal:         General: Tenderness present. Cervical back: Normal range of motion and neck supple. Comments: L wrist with point tenderness, limited active ROM, full passive ROM due to pain   Lymphadenopathy:      Cervical: No cervical adenopathy. Skin:     General: Skin is warm and dry. Neurological:      Mental Status: He is alert and oriented to person, place, and time. Cranial Nerves: No cranial nerve deficit. Coordination: Coordination normal.      Comments: No sensory loss, Gait normal, Motor 5/5   Psychiatric:         Behavior: Behavior normal.         Thought Content: Thought content normal.         Judgment: Judgment normal.         Diagnostic Study Results     Labs -  No results found for this or any previous visit (from the past 12 hour(s)). Radiologic Studies -   No orders to display         Medical Decision Making   I am the first provider for this patient. I reviewed the vital signs, available nursing notes, past medical history, past surgical history, family history and social history.     Vital Signs-Reviewed the patient's vital signs. Records Reviewed: Nursing Notes, Old Medical Records, Previous Radiology Studies, and Previous Laboratory Studies (Time of Review: 10:39 AM)    ED Course: Progress Notes, Reevaluation, and Consults: The discharge instructions were reviewed with the patient and the patient verbalized understanding. The patient had no questions about the discharge instructions. The patient will follow closely with the outpatient care plan and will return if at all worsened or concerned. Christelle Kauffman DO 1:02 PM      Provider Notes (Medical Decision Making):   MDM  Number of Diagnoses or Management Options  Acute gout of left hand, unspecified cause  Diagnosis management comments: The patient is an 81yo male with hx of gout that presents with wrist and hand pain. Will initiate supportive care with hydrocodone and prednisone. Suspect gout and less likely infectious process. Will proceed with close outpatient care and will return if at all worsened or concerned. Christelle Kauffman DO        Procedures      Diagnosis     Clinical Impression:   1. Acute gout of left hand, unspecified cause        Disposition:DC    Follow-up Information    None          Patient's Medications   Start Taking    No medications on file   Continue Taking    ACETAMINOPHEN (TYLENOL) 325 MG TABLET    Take 2 Tabs by mouth every six (6) hours as needed for Pain. ALLOPURINOL (ZYLOPRIM) 100 MG TABLET    Take  by mouth daily. AMLODIPINE (NORVASC) 10 MG TABLET    Take  by mouth daily. ATORVASTATIN (LIPITOR) 40 MG TABLET    Take  by mouth daily. CARVEDILOL (COREG) 25 MG TABLET    Take 25 mg by mouth two (2) times daily (with meals). CLOPIDOGREL (PLAVIX) 75 MG TAB        DOXAZOSIN (CARDURA) 4 MG TABLET    Take  by mouth daily. FERROUS SULFATE (IRON) 325 MG (65 MG IRON) EC TABLET    1 tab(s)    FUROSEMIDE (LASIX) 20 MG TABLET    Take  by mouth daily.     HYDRALAZINE (APRESOLINE) 50 MG TABLET    Take 25 mg by mouth three (3) times daily.    LORATADINE (CLARITIN) 10 MG TABLET    Take 10 mg by mouth. NITROGLYCERIN (NITROSTAT) 0.4 MG SL TABLET        ONDANSETRON HCL (ZOFRAN) 4 MG TABLET    Take 1 Tablet by mouth every eight (8) hours as needed for Nausea. These Medications have changed    No medications on file   Stop Taking    No medications on file     Disclaimer: Sections of this note are dictated using utilizing voice recognition software. Minor typographical errors may be present. If questions arise, please do not hesitate to contact me or call our department.

## 2022-10-22 NOTE — ED TRIAGE NOTES
Arm pain started yesterday and all night,  this am it is warm and swollen.   Patient states that he has had this before when he ate fish = gout, but he has not eaten any fish    Took tramadol at 3am  Has an RX for vicodin   Wife here with him

## 2022-10-22 NOTE — DISCHARGE INSTRUCTIONS
Take prednisone starting tomorrow as your first dose was given in the emergency department and take hydrocodone only as needed for pain. Do not take both the hydrocodone and the tramadol as they are both pain medications and you do not need both. I have given you a few more doses of hydrocodone because you are running low. If you have any worsening, fevers, chills, numbness, or at all concerned please return immediately.

## 2022-10-22 NOTE — ED NOTES
Discharged to home with wife after taking prescribed medications here.   Reviewed all of his discharge information and allowed time for questions and clarification

## 2022-12-19 ENCOUNTER — APPOINTMENT (OUTPATIENT)
Dept: GENERAL RADIOLOGY | Age: 82
End: 2022-12-19
Attending: EMERGENCY MEDICINE
Payer: MEDICARE

## 2022-12-19 ENCOUNTER — APPOINTMENT (OUTPATIENT)
Dept: CT IMAGING | Age: 82
End: 2022-12-19
Attending: EMERGENCY MEDICINE
Payer: MEDICARE

## 2022-12-19 ENCOUNTER — HOSPITAL ENCOUNTER (INPATIENT)
Age: 82
LOS: 5 days | Discharge: HOME HEALTH | End: 2022-12-24
Attending: EMERGENCY MEDICINE | Admitting: INTERNAL MEDICINE
Payer: MEDICARE

## 2022-12-19 DIAGNOSIS — N17.9 ACUTE KIDNEY INJURY SUPERIMPOSED ON CHRONIC KIDNEY DISEASE (HCC): ICD-10-CM

## 2022-12-19 DIAGNOSIS — T82.898A ARTERIOVENOUS FISTULA OCCLUSION, INITIAL ENCOUNTER (HCC): ICD-10-CM

## 2022-12-19 DIAGNOSIS — J96.01 ACUTE RESPIRATORY FAILURE WITH HYPOXIA (HCC): ICD-10-CM

## 2022-12-19 DIAGNOSIS — I50.9 ACUTE DECOMPENSATED HEART FAILURE (HCC): Primary | ICD-10-CM

## 2022-12-19 DIAGNOSIS — I27.20 PULMONARY HYPERTENSION (HCC): ICD-10-CM

## 2022-12-19 DIAGNOSIS — I35.0 NONRHEUMATIC AORTIC VALVE STENOSIS: ICD-10-CM

## 2022-12-19 DIAGNOSIS — N18.9 ACUTE KIDNEY INJURY SUPERIMPOSED ON CHRONIC KIDNEY DISEASE (HCC): ICD-10-CM

## 2022-12-19 LAB
ALBUMIN SERPL-MCNC: 3 G/DL (ref 3.4–5)
ALBUMIN/GLOB SERPL: 0.8 {RATIO} (ref 0.8–1.7)
ALP SERPL-CCNC: 91 U/L (ref 45–117)
ALT SERPL-CCNC: 114 U/L (ref 16–61)
ANION GAP BLD CALC-SCNC: 15 MMOL/L (ref 10–20)
ANION GAP SERPL CALC-SCNC: 10 MMOL/L (ref 3–18)
AST SERPL-CCNC: 115 U/L (ref 10–38)
ATRIAL RATE: 92 BPM
BASE DEFICIT BLD-SCNC: 1 MMOL/L
BASOPHILS # BLD: 0 K/UL (ref 0–0.1)
BASOPHILS NFR BLD: 0 % (ref 0–2)
BILIRUB SERPL-MCNC: 0.7 MG/DL (ref 0.2–1)
BNP SERPL-MCNC: ABNORMAL PG/ML (ref 0–1800)
BUN SERPL-MCNC: 95 MG/DL (ref 7–18)
BUN/CREAT SERPL: 19 (ref 12–20)
CA-I BLD-MCNC: 1.27 MMOL/L (ref 1.12–1.32)
CALCIUM SERPL-MCNC: 9 MG/DL (ref 8.5–10.1)
CALCULATED P AXIS, ECG09: 24 DEGREES
CALCULATED R AXIS, ECG10: 25 DEGREES
CALCULATED T AXIS, ECG11: -83 DEGREES
CHLORIDE BLD-SCNC: 110 MMOL/L (ref 98–107)
CHLORIDE SERPL-SCNC: 110 MMOL/L (ref 100–111)
CO2 BLD-SCNC: 22 MMOL/L (ref 19–24)
CO2 SERPL-SCNC: 23 MMOL/L (ref 21–32)
COVID-19 RAPID TEST, COVR: NOT DETECTED
CREAT BLD-MCNC: 4.77 MG/DL (ref 0.6–1.3)
CREAT SERPL-MCNC: 5.04 MG/DL (ref 0.6–1.3)
D DIMER PPP FEU-MCNC: 2.19 UG/ML(FEU)
DIAGNOSIS, 93000: NORMAL
DIFFERENTIAL METHOD BLD: ABNORMAL
EOSINOPHIL # BLD: 0.1 K/UL (ref 0–0.4)
EOSINOPHIL NFR BLD: 2 % (ref 0–5)
ERYTHROCYTE [DISTWIDTH] IN BLOOD BY AUTOMATED COUNT: 19.4 % (ref 11.6–14.5)
FLUAV AG NPH QL IA: NEGATIVE
FLUBV AG NOSE QL IA: NEGATIVE
GLOBULIN SER CALC-MCNC: 3.7 G/DL (ref 2–4)
GLUCOSE BLD-MCNC: 208 MG/DL (ref 65–100)
GLUCOSE SERPL-MCNC: 204 MG/DL (ref 74–99)
HCO3 BLD-SCNC: 23 MMOL/L (ref 22–26)
HCT VFR BLD AUTO: 27.7 % (ref 36–48)
HGB BLD-MCNC: 8.6 G/DL (ref 13–16)
IMM GRANULOCYTES # BLD AUTO: 0 K/UL (ref 0–0.04)
IMM GRANULOCYTES NFR BLD AUTO: 0 % (ref 0–0.5)
LACTATE BLD-SCNC: 2.42 MMOL/L (ref 0.4–2)
LYMPHOCYTES # BLD: 0.7 K/UL (ref 0.9–3.6)
LYMPHOCYTES NFR BLD: 11 % (ref 21–52)
MCH RBC QN AUTO: 28.4 PG (ref 24–34)
MCHC RBC AUTO-ENTMCNC: 31 G/DL (ref 31–37)
MCV RBC AUTO: 91.4 FL (ref 78–100)
MONOCYTES # BLD: 0.4 K/UL (ref 0.05–1.2)
MONOCYTES NFR BLD: 7 % (ref 3–10)
NEUTS SEG # BLD: 5 K/UL (ref 1.8–8)
NEUTS SEG NFR BLD: 80 % (ref 40–73)
NRBC # BLD: 0 K/UL (ref 0–0.01)
NRBC BLD-RTO: 0 PER 100 WBC
P-R INTERVAL, ECG05: 142 MS
PCO2 BLDV: 34.9 MMHG (ref 41–51)
PH BLDV: 7.43 [PH] (ref 7.32–7.42)
PLATELET # BLD AUTO: 143 K/UL (ref 135–420)
PMV BLD AUTO: 10.2 FL (ref 9.2–11.8)
PO2 BLDV: 31 MMHG (ref 25–40)
POTASSIUM BLD-SCNC: 3.9 MMOL/L (ref 3.5–5.1)
POTASSIUM SERPL-SCNC: 3.7 MMOL/L (ref 3.5–5.5)
PROT SERPL-MCNC: 6.7 G/DL (ref 6.4–8.2)
Q-T INTERVAL, ECG07: 386 MS
QRS DURATION, ECG06: 86 MS
QTC CALCULATION (BEZET), ECG08: 477 MS
RBC # BLD AUTO: 3.03 M/UL (ref 4.35–5.65)
SAO2 % BLD: 61 %
SERVICE CMNT-IMP: ABNORMAL
SODIUM BLD-SCNC: 146 MMOL/L (ref 136–145)
SODIUM SERPL-SCNC: 143 MMOL/L (ref 136–145)
SOURCE, COVRS: NORMAL
SPECIMEN SITE: ABNORMAL
TROPONIN-HIGH SENSITIVITY: 491 NG/L (ref 0–78)
TROPONIN-HIGH SENSITIVITY: 588 NG/L (ref 0–78)
VENTRICULAR RATE, ECG03: 92 BPM
WBC # BLD AUTO: 6.2 K/UL (ref 4.6–13.2)

## 2022-12-19 PROCEDURE — 87635 SARS-COV-2 COVID-19 AMP PRB: CPT

## 2022-12-19 PROCEDURE — 80053 COMPREHEN METABOLIC PANEL: CPT

## 2022-12-19 PROCEDURE — 85025 COMPLETE CBC W/AUTO DIFF WBC: CPT

## 2022-12-19 PROCEDURE — 84484 ASSAY OF TROPONIN QUANT: CPT

## 2022-12-19 PROCEDURE — 99285 EMERGENCY DEPT VISIT HI MDM: CPT

## 2022-12-19 PROCEDURE — 65660000004 HC RM CVT STEPDOWN

## 2022-12-19 PROCEDURE — 93005 ELECTROCARDIOGRAM TRACING: CPT

## 2022-12-19 PROCEDURE — 82947 ASSAY GLUCOSE BLOOD QUANT: CPT

## 2022-12-19 PROCEDURE — 85379 FIBRIN DEGRADATION QUANT: CPT

## 2022-12-19 PROCEDURE — 71045 X-RAY EXAM CHEST 1 VIEW: CPT

## 2022-12-19 PROCEDURE — 87804 INFLUENZA ASSAY W/OPTIC: CPT

## 2022-12-19 PROCEDURE — 51702 INSERT TEMP BLADDER CATH: CPT

## 2022-12-19 PROCEDURE — 71250 CT THORAX DX C-: CPT

## 2022-12-19 PROCEDURE — 74011000250 HC RX REV CODE- 250: Performed by: EMERGENCY MEDICINE

## 2022-12-19 PROCEDURE — 74011250636 HC RX REV CODE- 250/636: Performed by: EMERGENCY MEDICINE

## 2022-12-19 PROCEDURE — 83880 ASSAY OF NATRIURETIC PEPTIDE: CPT

## 2022-12-19 RX ORDER — BUMETANIDE 0.25 MG/ML
1 INJECTION INTRAMUSCULAR; INTRAVENOUS
Status: COMPLETED | OUTPATIENT
Start: 2022-12-19 | End: 2022-12-19

## 2022-12-19 RX ORDER — SODIUM CHLORIDE 0.9 % (FLUSH) 0.9 %
5-40 SYRINGE (ML) INJECTION EVERY 8 HOURS
Status: DISCONTINUED | OUTPATIENT
Start: 2022-12-19 | End: 2022-12-24 | Stop reason: HOSPADM

## 2022-12-19 RX ORDER — SODIUM CHLORIDE 0.9 % (FLUSH) 0.9 %
5-40 SYRINGE (ML) INJECTION AS NEEDED
Status: DISCONTINUED | OUTPATIENT
Start: 2022-12-19 | End: 2022-12-24 | Stop reason: HOSPADM

## 2022-12-19 RX ADMIN — SODIUM CHLORIDE, PRESERVATIVE FREE 10 ML: 5 INJECTION INTRAVENOUS at 07:31

## 2022-12-19 RX ADMIN — SODIUM CHLORIDE 500 ML: 9 INJECTION, SOLUTION INTRAVENOUS at 07:29

## 2022-12-19 RX ADMIN — SODIUM CHLORIDE, PRESERVATIVE FREE 10 ML: 5 INJECTION INTRAVENOUS at 16:28

## 2022-12-19 RX ADMIN — BUMETANIDE 1 MG: 0.25 INJECTION, SOLUTION INTRAMUSCULAR; INTRAVENOUS at 16:25

## 2022-12-19 NOTE — Clinical Note
TRANSFER - OUT REPORT:     Verbal report given to: alicja veras RN. Report consisted of patient's Situation, Background, Assessment and   Recommendations(SBAR). Opportunity for questions and clarification was provided. Patient transported with a Cardiac Cath Tech / Patient Care Tech. Patient transported to: holding area.

## 2022-12-19 NOTE — Clinical Note
New Palindrome catheter tunneled, existing temporary catheter removed over bentson wire, 1st and 2nd dilators inserted and removed, peel away sheath inserted over bentson wire, bentson wire removed, Palindrome inserted into peel away sheath, peel away sheath peeled away, catheter flushed and aspirated and locked with 2000u heparin in each port, catheter sutured into place with 2-O prolene and insertion site sutured with 4-O mcryl

## 2022-12-19 NOTE — PROGRESS NOTES
INTERIM UPDATE - 1025 EST on 12/19/2022    [facility] ER Physician calls requesting admission for an 80year-old Patient who presents with complaint of Intermittent Shortness of Breath for 1 day that has gotten worse this AM.  Patient was noted to have a SpO2s \"in the low 80s%\" and is ultimately being managed on BiPAP at this time. Patient reportedly does not have ESRD on HD. Patient is otherwise comfortable on BiPAP at this time. Notably, due to Patient's Acute Kidney Injury on Chronic Kidney Disease, Patient cannot safely receive CTA Chest w/ w/o Contrast to rule out Pulmonary Embolism. Had this been the case, Patient would have been evaluated for Pulmonary Embolism and potentially sent to another facility if Patient had Submassive Pulmonary Embolism and indication for Thrombectomy of evaluation for Thrombectomy. Plan: Will admit Patient to [facility] Unit for management of Acute Respiratory Failure (requiring BiPAP), Acute Congestive Heart Failure (some indication that Patient may have had previous suspicion of CHF per Chart Review), and MADHU Stage III (KDIGO Criteria) on CKD Stage IV.

## 2022-12-19 NOTE — Clinical Note
TRANSFER - IN REPORT:     Verbal report received from: Hakeem Ambriz RN. Report consisted of patient's Situation, Background, Assessment and   Recommendations(SBAR). Opportunity for questions and clarification was provided. Assessment completed upon patient's arrival to unit and care assumed. Patient transported with a Cardiac Cath Tech / Patient Care Tech and Oxygen. Oxygen used for patient = non-rebreather mask, @ 8 - 10 Liters.

## 2022-12-19 NOTE — ED NOTES
Pt has been provided a meal and placed on 6 L nasal cannula so he can eat. Pt continues to desat into the high 80's. Pt has to be reminded to take a deep breath to increase O2 Sat.  Will continue to monitor patient

## 2022-12-19 NOTE — ED PROVIDER NOTES
The patient is an 80-year-old male with past medical history significant for arthritis, gout, hyperlipidemia and hypertension, who also believes that he has a history of CHF, who presents to the ED today with shortness of breath for 1 day. He also states that this has been going on intermittently but his wife did not know about it until this morning. He denies fevers, chills, cough, chest pain. He also states that he does not have a history of COPD or lung disease. He is not on oxygen at home. Past Medical History:   Diagnosis Date    Arthritis     Chronic constipation     Gout     High cholesterol     Hypertension        Past Surgical History:   Procedure Laterality Date    HX HEENT      tonsillectomy cyst removed from under tongue    HX HERNIA REPAIR      Robot-assisted laparoscopic right inguinal hernia         History reviewed. No pertinent family history. Social History     Socioeconomic History    Marital status:      Spouse name: Not on file    Number of children: Not on file    Years of education: Not on file    Highest education level: Not on file   Occupational History    Not on file   Tobacco Use    Smoking status: Former    Smokeless tobacco: Former     Quit date: 3/11/1995   Substance and Sexual Activity    Alcohol use: No    Drug use: No    Sexual activity: Not on file   Other Topics Concern    Not on file   Social History Narrative    Not on file     Social Determinants of Health     Financial Resource Strain: Not on file   Food Insecurity: Not on file   Transportation Needs: Not on file   Physical Activity: Not on file   Stress: Not on file   Social Connections: Not on file   Intimate Partner Violence: Not on file   Housing Stability: Not on file         ALLERGIES: Patient has no known allergies. Review of Systems   All other systems reviewed and are negative.     Vitals:    12/19/22 0710   BP: 130/70   Pulse: 88   Resp: 22   SpO2: 94%   Weight: 61.2 kg (135 lb)   Height: 5' 4\" (1.626 m)            Physical Exam  Vitals and nursing note reviewed. Constitutional:       Appearance: He is well-developed. HENT:      Head: Normocephalic and atraumatic. Eyes:      Extraocular Movements: Extraocular movements intact. Pupils: Pupils are equal, round, and reactive to light. Cardiovascular:      Rate and Rhythm: Normal rate and regular rhythm. Pulses: Normal pulses. Heart sounds: Normal heart sounds. Pulmonary:      Effort: Pulmonary effort is normal.      Breath sounds: Normal breath sounds. Abdominal:      General: Bowel sounds are normal.      Palpations: Abdomen is soft. Musculoskeletal:         General: Normal range of motion. Cervical back: Normal range of motion and neck supple. Skin:     General: Skin is warm and dry. Capillary Refill: Capillary refill takes less than 2 seconds. Neurological:      General: No focal deficit present. Mental Status: He is alert and oriented to person, place, and time.    Psychiatric:         Mood and Affect: Mood normal.         Behavior: Behavior normal.      Recent Results (from the past 12 hour(s))   EKG, 12 LEAD, INITIAL    Collection Time: 12/19/22  6:59 AM   Result Value Ref Range    Ventricular Rate 92 BPM    Atrial Rate 92 BPM    P-R Interval 142 ms    QRS Duration 86 ms    Q-T Interval 386 ms    QTC Calculation (Bezet) 477 ms    Calculated P Axis 24 degrees    Calculated R Axis 25 degrees    Calculated T Axis -83 degrees    Diagnosis       Sinus rhythm with frequent premature atrial and ventricular beats  Marked ST abnormality, possible inferior subendocardial injury  Abnormal ECG  Confirmed by Nishant Chapin MD, Elda Fritz ([id number]) on 12/19/2022 9:36:02 AM     CBC WITH AUTOMATED DIFF    Collection Time: 12/19/22  7:08 AM   Result Value Ref Range    WBC 6.2 4.6 - 13.2 K/uL    RBC 3.03 (L) 4.35 - 5.65 M/uL    HGB 8.6 (L) 13.0 - 16.0 g/dL    HCT 27.7 (L) 36.0 - 48.0 %    MCV 91.4 78.0 - 100.0 FL    MCH 28.4 24.0 - 34.0 PG    MCHC 31.0 31.0 - 37.0 g/dL    RDW 19.4 (H) 11.6 - 14.5 %    PLATELET 249 356 - 091 K/uL    MPV 10.2 9.2 - 11.8 FL    NRBC 0.0 0  WBC    ABSOLUTE NRBC 0.00 0.00 - 0.01 K/uL    NEUTROPHILS 80 (H) 40 - 73 %    LYMPHOCYTES 11 (L) 21 - 52 %    MONOCYTES 7 3 - 10 %    EOSINOPHILS 2 0 - 5 %    BASOPHILS 0 0 - 2 %    IMMATURE GRANULOCYTES 0 0.0 - 0.5 %    ABS. NEUTROPHILS 5.0 1.8 - 8.0 K/UL    ABS. LYMPHOCYTES 0.7 (L) 0.9 - 3.6 K/UL    ABS. MONOCYTES 0.4 0.05 - 1.2 K/UL    ABS. EOSINOPHILS 0.1 0.0 - 0.4 K/UL    ABS. BASOPHILS 0.0 0.0 - 0.1 K/UL    ABS. IMM. GRANS. 0.0 0.00 - 0.04 K/UL    DF AUTOMATED     METABOLIC PANEL, COMPREHENSIVE    Collection Time: 12/19/22  7:08 AM   Result Value Ref Range    Sodium 143 136 - 145 mmol/L    Potassium 3.7 3.5 - 5.5 mmol/L    Chloride 110 100 - 111 mmol/L    CO2 23 21 - 32 mmol/L    Anion gap 10 3.0 - 18 mmol/L    Glucose 204 (H) 74 - 99 mg/dL    BUN 95 (H) 7.0 - 18 MG/DL    Creatinine 5.04 (H) 0.6 - 1.3 MG/DL    BUN/Creatinine ratio 19 12 - 20      eGFR 11 (L) >60 ml/min/1.73m2    Calcium 9.0 8.5 - 10.1 MG/DL    Bilirubin, total 0.7 0.2 - 1.0 MG/DL    ALT (SGPT) 114 (H) 16 - 61 U/L    AST (SGOT) 115 (H) 10 - 38 U/L    Alk.  phosphatase 91 45 - 117 U/L    Protein, total 6.7 6.4 - 8.2 g/dL    Albumin 3.0 (L) 3.4 - 5.0 g/dL    Globulin 3.7 2.0 - 4.0 g/dL    A-G Ratio 0.8 0.8 - 1.7     NT-PRO BNP    Collection Time: 12/19/22  7:08 AM   Result Value Ref Range    NT pro-BNP 44,142 (H) 0 - 1,800 PG/ML   TROPONIN-HIGH SENSITIVITY    Collection Time: 12/19/22  7:08 AM   Result Value Ref Range    Troponin-High Sensitivity 588 (HH) 0 - 78 ng/L   D DIMER    Collection Time: 12/19/22  7:08 AM   Result Value Ref Range    D DIMER 2.19 (H) <0.46 ug/ml(FEU)   BLOOD GAS,CHEM8,LACTIC ACID POC    Collection Time: 12/19/22  7:11 AM   Result Value Ref Range    Calcium, ionized (POC) 1.27 1.12 - 1.32 mmol/L    Base deficit (POC) 1.0 mmol/L    HCO3 (POC) 23.0 22 - 26 MMOL/L    CO2, POC 22 19 - 24 MMOL/L    O2 SAT 61 %    Sample source VENOUS BLOOD      Performed by Kalyan Thompson     Sodium (POC) 146 (H) 136 - 145 mmol/L    Potassium (POC) 3.9 3.5 - 5.1 mmol/L    Glucose (POC) 208 (H) 65 - 100 mg/dL    Creatinine (POC) 4.77 (H) 0.6 - 1.3 mg/dL    Lactic Acid (POC) 2.42 (HH) 0.40 - 2.00 mmol/L    Chloride (POC) 110 (H) 98 - 107 mmol/L    Anion gap, POC 15 10 - 20      pH, venous (POC) 7.43 (H) 7.32 - 7.42      pCO2, venous (POC) 34.9 (L) 41 - 51 MMHG    pO2, venous (POC) 31 25 - 40 mmHg    eGFR (POC) 12 (L) >60 ml/min/1.73m2   INFLUENZA A & B AG (RAPID TEST)    Collection Time: 12/19/22  7:17 AM   Result Value Ref Range    Influenza A Antigen Negative NEG      Influenza B Antigen Negative NEG     COVID-19 RAPID TEST    Collection Time: 12/19/22  7:17 AM   Result Value Ref Range    Specimen source Nasopharyngeal      COVID-19 rapid test Not detected       CT CHEST WO CONT   Final Result               1.  Bilateral pleural effusion with associated atelectatic changes. 2.  Aortic valve with pronounced calcifications. 3.  Some interlobular septal thickening and slight ground glass densities. In   light of the aortic valve calcifications, early manifestations of congestive   heart failure may be suspected. 4.  Renal hypodensities, favor renal cysts. 5.  Nonspecific slight precarinal janelle prominence. XR CHEST PORT   Final Result   :      1.  Cardiomegaly question pericardial effusion      Moderate congestive failure changes      Question developing right lower lobe pneumonia        CXR: Cardiomegaly with pulmonary edema    MDM  Number of Diagnoses or Management Options  Diagnosis management comments: The patient is an 51-year-old male who states that he has a history of chronic kidney disease and CHF, who presents the ED today with shortness of breath that began this morning.   His oxygen saturation was in the low 80s upon arrival.  He appears to have worsening chronic kidney disease with a creatinine in the fives where his prior creatinine was in the fours. He has worsening CHF with an elevated BNP, chest x-ray that shows cardiomegaly, and a mildly positive troponin. Additionally, he has an elevated D-dimer. I am unable to do a CT PE protocol at this time. His lactic acid is also elevated but I believe that is secondary to kidney disease. I am giving him 500 mL of fluid. He is maintaining a blood pressure of 125/72. We have placed him on BiPAP at this time. I will consult the hospitalist for admission. The hospitalist has accepted the patient and the patient is awaiting a bed at [facility].            Critical Care  Performed by: Elina Downey MD  Authorized by: Elina Downey MD     Critical care provider statement:     Critical care time (minutes):  40    Critical care time was exclusive of:  Separately billable procedures and treating other patients and teaching time    Critical care was necessary to treat or prevent imminent or life-threatening deterioration of the following conditions:  Cardiac failure, circulatory failure, respiratory failure and renal failure    Critical care was time spent personally by me on the following activities:  Blood draw for specimens, development of treatment plan with patient or surrogate, discussions with consultants, evaluation of patient's response to treatment, examination of patient, obtaining history from patient or surrogate, ordering and performing treatments and interventions, ordering and review of laboratory studies, ordering and review of radiographic studies, pulse oximetry, re-evaluation of patient's condition, review of old charts and ventilator management    I assumed direction of critical care for this patient from another provider in my specialty: no      Care discussed with: accepting provider at another facility

## 2022-12-20 ENCOUNTER — DOCUMENTATION ONLY (OUTPATIENT)
Dept: NEPHROLOGY | Age: 82
End: 2022-12-20

## 2022-12-20 ENCOUNTER — APPOINTMENT (OUTPATIENT)
Dept: NON INVASIVE DIAGNOSTICS | Age: 82
End: 2022-12-20
Attending: FAMILY MEDICINE
Payer: MEDICARE

## 2022-12-20 ENCOUNTER — APPOINTMENT (OUTPATIENT)
Dept: GENERAL RADIOLOGY | Age: 82
End: 2022-12-20
Attending: PHYSICIAN ASSISTANT
Payer: MEDICARE

## 2022-12-20 LAB
ECHO AO ASC DIAM: 3.3 CM
ECHO AO ASCENDING AORTA INDEX: 1.99 CM/M2
ECHO AO ROOT DIAM: 3.8 CM
ECHO AO ROOT INDEX: 2.29 CM/M2
ECHO AR MAX VEL PISA: 3.6 M/S
ECHO AV AREA PEAK VELOCITY: 0.6 CM2
ECHO AV AREA PLAN: 1.2 CM2
ECHO AV AREA PLAN: 1.2 CM2
ECHO AV AREA VTI: 0.7 CM2
ECHO AV AREA/BSA PEAK VELOCITY: 0.4 CM2/M2
ECHO AV AREA/BSA VTI: 0.4 CM2/M2
ECHO AV MEAN GRADIENT: 51 MMHG
ECHO AV MEAN VELOCITY: 3.4 M/S
ECHO AV PEAK GRADIENT: 92 MMHG
ECHO AV PEAK VELOCITY: 4.8 M/S
ECHO AV REGURGITANT PHT: 303.7 MILLISECOND
ECHO AV VELOCITY RATIO: 0.17
ECHO AV VTI: 111.1 CM
ECHO EST RA PRESSURE: 15 MMHG
ECHO LA VOL 2C: 139 ML (ref 18–58)
ECHO LA VOL 4C: 117 ML (ref 18–58)
ECHO LA VOLUME AREA LENGTH: 142 ML
ECHO LA VOLUME INDEX A2C: 84 ML/M2 (ref 16–34)
ECHO LA VOLUME INDEX A4C: 70 ML/M2 (ref 16–34)
ECHO LA VOLUME INDEX AREA LENGTH: 86 ML/M2 (ref 16–34)
ECHO LV EDV A2C: 167 ML
ECHO LV EDV A4C: 179 ML
ECHO LV EDV BP: 179 ML (ref 67–155)
ECHO LV EDV INDEX A4C: 108 ML/M2
ECHO LV EDV INDEX BP: 108 ML/M2
ECHO LV EDV NDEX A2C: 101 ML/M2
ECHO LV EJECTION FRACTION A2C: 37 %
ECHO LV EJECTION FRACTION A2C: 38 %
ECHO LV EJECTION FRACTION A4C: 32 %
ECHO LV EJECTION FRACTION A4C: 33 %
ECHO LV EJECTION FRACTION BIPLANE: 37 % (ref 55–100)
ECHO LV ESV A2C: 105 ML
ECHO LV ESV A4C: 121 ML
ECHO LV ESV BP: 113 ML (ref 22–58)
ECHO LV ESV INDEX A2C: 63 ML/M2
ECHO LV ESV INDEX A4C: 73 ML/M2
ECHO LV ESV INDEX BP: 68 ML/M2
ECHO LV FRACTIONAL SHORTENING: 0 % (ref 28–44)
ECHO LV INTERNAL DIMENSION DIASTOLE INDEX: 2.77 CM/M2
ECHO LV INTERNAL DIMENSION DIASTOLIC: 4.6 CM (ref 4.2–5.9)
ECHO LV INTERNAL DIMENSION SYSTOLIC INDEX: 2.77 CM/M2
ECHO LV INTERNAL DIMENSION SYSTOLIC: 4.6 CM
ECHO LV IVSD: 1.3 CM (ref 0.6–1)
ECHO LV MASS 2D: 230.2 G (ref 88–224)
ECHO LV MASS INDEX 2D: 138.6 G/M2 (ref 49–115)
ECHO LV POSTERIOR WALL DIASTOLIC: 1.3 CM (ref 0.6–1)
ECHO LV RELATIVE WALL THICKNESS RATIO: 0.57
ECHO LVOT AREA: 3.8 CM2
ECHO LVOT AV VTI INDEX: 0.17
ECHO LVOT CARDIAC OUTPUT: 4.2 LITER/MINUTE
ECHO LVOT CARDIAC OUTPUT: 4.2 LITER/MINUTE
ECHO LVOT CARDIAC OUTPUT: 4.5 LITER/MINUTE
ECHO LVOT CARDIAC OUTPUT: 4.5 LITER/MINUTE
ECHO LVOT CARDIAC OUTPUT: 4.7 LITER/MINUTE
ECHO LVOT CARDIAC OUTPUT: 4.7 LITER/MINUTE
ECHO LVOT DIAM: 2.2 CM
ECHO LVOT MEAN GRADIENT: 1 MMHG
ECHO LVOT PEAK GRADIENT: 3 MMHG
ECHO LVOT PEAK VELOCITY: 0.8 M/S
ECHO LVOT STROKE VOLUME INDEX: 43.9 ML/M2
ECHO LVOT SV: 72.9 ML
ECHO LVOT VTI: 19.2 CM
ECHO MV REGURGITANT ALIASING (NYQUIST) VELOCITY: 31 CM/S
ECHO MV REGURGITANT VELOCITY PISA: 6.3 M/S
ECHO MV REGURGITANT VTIA: 210.5 CM
ECHO RA AREA 4C: 29.5 CM2
ECHO RIGHT VENTRICULAR SYSTOLIC PRESSURE (RVSP): 87 MMHG
ECHO RV BASAL DIMENSION: 4.7 CM
ECHO RV FREE WALL PEAK S': 13 CM/S
ECHO RV TAPSE: 2 CM (ref 1.7–?)
ECHO TV REGURGITANT MAX VELOCITY: 4.23 M/S
ECHO TV REGURGITANT PEAK GRADIENT: 72 MMHG
PHOSPHATE SERPL-MCNC: 4.4 MG/DL (ref 2.5–4.9)

## 2022-12-20 PROCEDURE — 87340 HEPATITIS B SURFACE AG IA: CPT

## 2022-12-20 PROCEDURE — 74011250637 HC RX REV CODE- 250/637: Performed by: FAMILY MEDICINE

## 2022-12-20 PROCEDURE — 86706 HEP B SURFACE ANTIBODY: CPT

## 2022-12-20 PROCEDURE — 94660 CPAP INITIATION&MGMT: CPT

## 2022-12-20 PROCEDURE — 5A09357 ASSISTANCE WITH RESPIRATORY VENTILATION, LESS THAN 24 CONSECUTIVE HOURS, CONTINUOUS POSITIVE AIRWAY PRESSURE: ICD-10-PCS | Performed by: SURGERY

## 2022-12-20 PROCEDURE — 71045 X-RAY EXAM CHEST 1 VIEW: CPT

## 2022-12-20 PROCEDURE — 93356 MYOCRD STRAIN IMG SPCKL TRCK: CPT | Performed by: INTERNAL MEDICINE

## 2022-12-20 PROCEDURE — 65660000004 HC RM CVT STEPDOWN

## 2022-12-20 PROCEDURE — 99223 1ST HOSP IP/OBS HIGH 75: CPT | Performed by: FAMILY MEDICINE

## 2022-12-20 PROCEDURE — 90935 HEMODIALYSIS ONE EVALUATION: CPT

## 2022-12-20 PROCEDURE — 87070 CULTURE OTHR SPECIMN AEROBIC: CPT

## 2022-12-20 PROCEDURE — 84100 ASSAY OF PHOSPHORUS: CPT

## 2022-12-20 PROCEDURE — 74011000250 HC RX REV CODE- 250: Performed by: EMERGENCY MEDICINE

## 2022-12-20 PROCEDURE — 83970 ASSAY OF PARATHORMONE: CPT

## 2022-12-20 PROCEDURE — 93306 TTE W/DOPPLER COMPLETE: CPT

## 2022-12-20 PROCEDURE — 93306 TTE W/DOPPLER COMPLETE: CPT | Performed by: INTERNAL MEDICINE

## 2022-12-20 RX ORDER — SODIUM CHLORIDE 0.9 % (FLUSH) 0.9 %
5-40 SYRINGE (ML) INJECTION AS NEEDED
Status: DISCONTINUED | OUTPATIENT
Start: 2022-12-20 | End: 2022-12-24 | Stop reason: HOSPADM

## 2022-12-20 RX ORDER — ALLOPURINOL 100 MG/1
100 TABLET ORAL DAILY
Status: DISCONTINUED | OUTPATIENT
Start: 2022-12-20 | End: 2022-12-24 | Stop reason: HOSPADM

## 2022-12-20 RX ORDER — CLOPIDOGREL BISULFATE 75 MG/1
75 TABLET ORAL DAILY
Status: DISCONTINUED | OUTPATIENT
Start: 2022-12-21 | End: 2022-12-24 | Stop reason: HOSPADM

## 2022-12-20 RX ORDER — ONDANSETRON 8 MG/1
4 TABLET, ORALLY DISINTEGRATING ORAL
Status: DISCONTINUED | OUTPATIENT
Start: 2022-12-20 | End: 2022-12-24 | Stop reason: HOSPADM

## 2022-12-20 RX ORDER — BUMETANIDE 0.25 MG/ML
1 INJECTION INTRAMUSCULAR; INTRAVENOUS 2 TIMES DAILY
Status: DISCONTINUED | OUTPATIENT
Start: 2022-12-20 | End: 2022-12-24

## 2022-12-20 RX ORDER — HEPARIN SODIUM 1000 [USP'U]/ML
1000 INJECTION, SOLUTION INTRAVENOUS; SUBCUTANEOUS
Status: DISCONTINUED | OUTPATIENT
Start: 2022-12-20 | End: 2022-12-24 | Stop reason: HOSPADM

## 2022-12-20 RX ORDER — ACETAMINOPHEN 325 MG/1
650 TABLET ORAL
Status: DISCONTINUED | OUTPATIENT
Start: 2022-12-20 | End: 2022-12-24 | Stop reason: HOSPADM

## 2022-12-20 RX ORDER — FUROSEMIDE 10 MG/ML
40 INJECTION INTRAMUSCULAR; INTRAVENOUS 2 TIMES DAILY
Status: CANCELLED | OUTPATIENT
Start: 2022-12-20

## 2022-12-20 RX ORDER — POLYETHYLENE GLYCOL 3350 17 G/17G
17 POWDER, FOR SOLUTION ORAL DAILY PRN
Status: DISCONTINUED | OUTPATIENT
Start: 2022-12-20 | End: 2022-12-24 | Stop reason: HOSPADM

## 2022-12-20 RX ORDER — SODIUM CHLORIDE 0.9 % (FLUSH) 0.9 %
5-40 SYRINGE (ML) INJECTION EVERY 8 HOURS
Status: DISCONTINUED | OUTPATIENT
Start: 2022-12-20 | End: 2022-12-24 | Stop reason: HOSPADM

## 2022-12-20 RX ORDER — ATORVASTATIN CALCIUM 40 MG/1
40 TABLET, FILM COATED ORAL DAILY
Status: DISCONTINUED | OUTPATIENT
Start: 2022-12-21 | End: 2022-12-24 | Stop reason: HOSPADM

## 2022-12-20 RX ORDER — ONDANSETRON 2 MG/ML
4 INJECTION INTRAMUSCULAR; INTRAVENOUS
Status: DISCONTINUED | OUTPATIENT
Start: 2022-12-20 | End: 2022-12-24 | Stop reason: HOSPADM

## 2022-12-20 RX ORDER — CARVEDILOL 25 MG/1
25 TABLET ORAL 2 TIMES DAILY WITH MEALS
Status: DISCONTINUED | OUTPATIENT
Start: 2022-12-20 | End: 2022-12-24 | Stop reason: HOSPADM

## 2022-12-20 RX ADMIN — SODIUM CHLORIDE, PRESERVATIVE FREE 10 ML: 5 INJECTION INTRAVENOUS at 13:12

## 2022-12-20 RX ADMIN — CARVEDILOL 25 MG: 25 TABLET, FILM COATED ORAL at 20:24

## 2022-12-20 NOTE — PROGRESS NOTES
12/20/22 0940   Oxygen Therapy   O2 Sat (%) 93 %   Pulse via Oximetry 93 beats per minute   O2 Device BIPAP   FIO2 (%) 40 %   CPAP/BIPAP   CPAP/BIPAP Start/Stop On   Device Mode BIPAP   $$ Bipap Daily Yes   Bio-Med ID # [id number]   Mask Type and Size Full face; Medium   Skin Condition intact   PIP Observed 12 cm H20   IPAP (cm H2O) 12 cm H2O   EPAP (cm H2O) 5 cm H2O   Inspiratory Time (sec) 1 seconds   Vt Spont (ml) 569 ml   Ve Observed (l/min) 18.9 l/min   Backup Rate 8   Total RR (Spontaneous) 28 breaths per minute   Insp Rise Time (sec) 3   Leak (Estimated) 25 L/min   Pt's Home Machine No   Biomedical Check Performed Yes   Settings Verified Yes   Alarm Settings   High Pressure 40   Low Pressure 8   Apnea 20   Low Ve 3   High Rate 40   Low Rate 8     Patient came in on BIPAP. Patient placed on settings provided from transport team, BIpap 12/5 Fio2 40%. No respiratory distress on BIPAP. Spo2 93%. RN at the bedside.

## 2022-12-20 NOTE — ED TRIAGE NOTES
Transferred from [facility] via EMS to Room 15 via stretcher. Patient complains of shortness of breath. Desats on room air. Aspirated with a drink of water. 89% on BIPAP at [facility]. Arrived with BIPAP engaged. 150/75. No complaints en route.

## 2022-12-20 NOTE — ROUTINE PROCESS
TRANSFER - OUT REPORT:    Verbal report given to Bianca Olivares RN(name) on Michelle Albarran  being transferred to [facility]/ED(unit) for routine progression of care       Report consisted of patients Situation, Background, Assessment and   Recommendations(SBAR). Information from the following report(s) SBAR and MAR was reviewed with the receiving nurse. Lines:   Peripheral IV 12/19/22 Right Antecubital (Active)        Opportunity for questions and clarification was provided.       Patient transported with:   Monitor, BIPAP

## 2022-12-20 NOTE — ED NOTES
Patient resting with eyes closed with chest rise and fall noted.  Patient shows no work of breathing

## 2022-12-20 NOTE — PROGRESS NOTES
[facility]  Appointment: 2022 2:00 PM  Location: [facility] Office  Patient #: [id number]  : 1940  Undefined / Language: [place] / Race: Black or   Male    History of Present Illness Berta Boyce MD; 2022 11:49 PM)  The patient is a 80year old male who presents for a Recheck of Chronic kidney disease. This is classified as stage 4. Note: Patient is a 42-year-old male with history of essential hypertension, diastolic congestive heart failure with pulmonary hypertension, history of hyper homocystinemia, aortic atherosclerotic disease who has been referred from PCPs office for evaluation of chronic kidney disease. Patient has had progressive CKD with baseline creatinine in the 3 range. EGFR of less than 20. He has had significant weight loss over the last few months. He continues to have good appetite. In February he had multiple visits to the emergency room for fall as he was getting out of the bed. 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 Patient had seen cardiology in  and he is upon being restarted on Lasix to 40 mg twice daily. His blood pressure appears to be in good range. Denies any urinary symptoms but does have extremity edema and exertional dyspnea.     Interval history  denies urinary symptoms  ventral hernia  creat worse at 4.08 , gfr 17  K 4.0  Co2 28  no edema , no urinary symptoms,  Lasix 20 mg BID as needed  BP better  appetite ok  sleep ok  renal US : nicole atrophic kidney  /60    Problem List/Past Medical (Ashli Martínez; 2022 1:10 PM)  Essential hypertension (I10)    Vitamin D deficiency due to chronic kidney disease (E55.9)    Chronic kidney disease, stage 4 (severe) (N18.4)   stage 4  Pulmonary hypertension (I27.20)    Congestive heart failure (I50.9)    Allergic rhinitis (J30.9)    Dyslipidemia (E78.5)    Anemia (D64.9)    Aortic atherosclerosis (I70.0)    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 stopped 20+ years  No Drug Use    Non Drinker/No Alcohol Use      Medication History (Jeanine Elaine; 9/22/2022 1:11 PM)  hydrALAZINE HCl  (100MG Tablet, 1 tablet Oral three times daily, Taken starting 01/21/2022) Active. Zofran  (4MG Tablet, Oral as needed) Active. Clopidogrel Bisulfate  (75MG Tablet, 1 Oral daily) Active. traMADol HCl  (50MG Tablet, Oral as needed) Active. Ciprofloxacin  (500MG Tablet, 1 Oral two times daily) Active. metroNIDAZOLE  (500MG Tablet, 1 Oral two times daily) Active. Carvedilol  (25MG Tablet, 1 Oral daily) Active. Ferrous Sulfate  (325 (65 Fe)MG Tablet, 1 Oral daily) Active. Voltaren  (1% Gel, External as directed) Active. Tylenol  (325MG Tablet, 2 Oral every 6 hours as needed for pain) Active. Hydrocortisone Ace-Pramoxine  (2.5-1% Cream, External 3 times a day) Active. Pravastatin Sodium  (40MG Tablet, 1 Oral nightly) Active. Kenalog  (0.1% Cream, External) Active. Flonase  (50MCG/DOSE Inhaler, 2 sprays Nasal both nostrils route daily) Active. Cyanocobalamin  (1000MCG Tablet, 1 Oral daily) Active. Multi-Vitamin  (1 Oral daily) Active. Omega-3  (1000MG Capsule, 1 Oral daily) Active. Allopurinol  (100MG Tablet, 1 Oral daily) Active. amLODIPine Besylate  (10MG Tablet, 1 Oral daily) Active. Furosemide  (20MG Tablet, 1 Oral two times daily) Active. Doxazosin Mesylate  (4MG Tablet, 1 Oral at bedtime) Active. Medications Reconciled     Health Maintenance History (Rubio Campo; 9/22/2022 1:11 PM)  Colonoscopy, Screening   [06/2018]: Normal.  Flu Vaccine   [11/2021]:  Pneumovax   Refused.   covid 19 vaccine dates: 06/02/2021, 06/23/2021 & 01/05/2022 Luis Wu)          Review of Systems Mark Hoyos MD; 9/22/2022 11:49 PM)  General Not Present- Anorexia, Chills, Fatigue and Fever. Skin Not Present- Bruising, Pruritus, Rash and Ulcer. HEENT Not Present- Dry Mucous Membranes, Dysgeusia, Oral Ulcers, Periorbital Puffiness and Sore Throat. Respiratory Not Present- Cough, Difficulty Breathing on Exertion, Dyspnea and Hemoptysis. Cardiovascular Not Present- Chest Pain, Claudications, Orthopnea, Palpitations, Paroxysmal Nocturnal Dyspnea and Swelling of Extremities. Gastrointestinal Not Present- Abdominal Pain, Abdominal Swelling, Constipation, Diarrhea, Hematochezia, Melena, Nausea and Vomiting. Musculoskeletal Not Present- Joint Pain, Joint Redness, Joint Stiffness, Joint Swelling, Leg Cramps and Myalgia. Neurological Not Present- Dizziness, Headaches, Syncope and Trouble walking. Endocrine Not Present- Appetite Changes, Excessive Thirst, Polydipsia and Polyuria. Hematology Not Present- Easy Bruising and Excessive bleeding. Vitals (Rubio Campo; 9/22/2022 1:14 PM)  9/22/2022 1:11 PM  Weight: 132.5 lb   Height: 64 in   Height was reported by patient. Body Surface Area: 1.64 m²   Body Mass Index: 22.74 kg/m²    Pain Level: 0/10    Temp.: 98.6° F    Pulse: 45 (Regular)    Resp.: 12 (Unlabored)    P. OX: 98% (Room air)  BP: 110/60(Sitting, Left Arm, Standard)              Physical Exam Mark Hoyos MD; 9/22/2022 11:49 PM)  General  General Appearance - Not in acute distress. Build & Nutrition - Well nourished and Well developed. Integumentary  General Characteristics  Overall examination of the patient's skin reveals - no rashes. Eye  Sclera/Conjunctiva - Bilateral - Nonicteric. ENMT  Mouth and Throat  Oral Cavity/Oropharynx - Gingiva - no ulcerations noted, No excessive growth of soft tissue present. Oral Mucosa - moist. Oropharynx - No presence of white exudate noted. Chest and Lung Exam  Auscultation  Breath sounds - Normal and Clear. Adventitious sounds - No Adventitious sounds.     Cardiovascular  Cardiovascular examination reveals  - on palpation PMI is normal in location and amplitude, no palpable S3 or S4. Normal cardiac borders. , normal heart sounds, regular rate and rhythm with no murmurs, carotid auscultation reveals no bruits, abdominal aorta auscultation reveals no bruits, normal pedal pulses bilaterally and no digital clubbing, cyanosis, edema, increased warmth or tenderness. Abdomen  Inspection  Inspection of the abdomen reveals - No Abnormal pulsations. Palpation/Percussion  Palpation and Percussion of the abdomen reveal - Soft, Non Tender, No hepatosplenomegaly and No Palpable abdominal masses. Auscultation  Auscultation of the abdomen reveals - Bowel sounds normal and No Abdominal bruits. Neurologic  Neurologic evaluation reveals  - alert and oriented x 3 with no impairment of recent or remote memory. Lymphatic  General Lymphatics  Description - No Cervical lymphadeopathy. Assessment & Plan Tanika Mancilla MD; 9/22/2022 11:51 PM)    Chronic kidney disease, stage 4 (severe) (N18.4) <[id number]>  Story: stage 4  Impression: . Valdez Copping #1 chronic kidney disease stage IV/5, etiology appears to be hypertension nephrosclerosis, congestive heart failure with cardiorenal syndrome. Creatinine 4.1 EGFR of 17 , patient has had sub nephrotic proteinuria < 1 gm, Monitor his blood pressure daily and log.  Avoid NSAIDs and other nephrotoxins  #2 Essential hypertension, uncontrolled  #3 Congestive heart failure, preserved EF but has diastolic dysfunction and pulmonary hypertension  #4 pulmonary hypertension  #6 atherosclerotic disease  #6 hyper homocystinemia  #7 anemia of chronic kidney disease  #8 secondary hyperparathyroidism    Plan:  #1 continue to monitor renal parameters closely  #2 monitor BP at home , log , goal < 130/80  #3 continue Lasix 20 mg twice daily as needed  #4 anemia of CKD at goal , follows with heme-onc for ANNA  #5 BMD labs not checked this time , check on f/u  #6 monitor blood pressure daily and log it  #7 dialysis education done ---> prefers home dialysis --> PD --> defer for tour  #8 avoid NSAIDs and other nephrotoxins  #10 referred to urology last visit for the left kidney complex cyst    F/U in office in 3 months with CKD labs    Current Plans  PTH INTACT (32021)  RENAL FUNCTION PANEL (43445)  SPOT PROTEIN URINE (35035)  SPOT URINE CREATININE(88322)    Essential hypertension (I10)    Current Plans  CBC (37374)    Vitamin D deficiency due to chronic kidney disease (E55.9)    Current Plans  VITAMIN D 25 LEVEL (33443)    Secondary hyperparathyroidism, renal (N25.81) <HCC23>    Current Plans  Started Calcitriol 0.25 MCG Oral Capsule, 1 (one) Capsule monday, wednesday & fridays, #45, 90 days starting 09/22/2022, Ref. x1.    Portal Access Education (Z71.9)    Current Plans  Pt Education - How to [address] using Patient Portal and 3rd Party Apps: discussed with patient and provided information.   Signed by Mary Ross MD (9/22/2022 11:51 PM)

## 2022-12-20 NOTE — CONSULTS
Consult Note        Consult requested by: Gustavo Sellers MD    ADMIT DATE: 12/19/2022    CONSULT DATE: December 20, 2022           Admission diagnosis: Respiratory failure   Reason for Nephrology Consultation: Ye Miles     #1 chronic kidney disease stage 5 ---> ESRD , now needing dialysis for volume overload.  etiology of CKD is  hypertension nephrosclerosis, congestive heart failure with cardiorenal syndrome. Creatinine now in 5.04 range , BUN ~ 90s  EGFR of 17 , volume overload with CHF and diuretic resistance, CXR and CT suggestive of CHF , nicole pleural effusions and severe cardiomegaly  #2 Essential hypertension, uncontrolled  #3 AC on chronic Congestive heart failure, preserved EF but has diastolic dysfunction and pulmonary hypertension  #4 severe pulmonary hypertension  #6 atherosclerotic disease  #6 h/o  homocystinemia  #7 anemia of chronic kidney disease  #8 secondary hyperparathyroidism    Plan:  #1 monitor strict I/o charting, Hernandez catheter in place  #2 Bumex 2 mg twice a day IV  #3 reviewed outpatient record patient has had dialysis education, prefers home dialysis, especially PD, has had a tour of dialysis unit as we an iron studies ll. #4 considering patient's chronic CHF, PD may be ideal modality for him  #5 Will initiate HD in the hospital and discussed with patient regarding transition to PD later  #6 consent obtained from patient regarding initiating dialysis for volume overload and ckd5 ---> end-stage renal disease.   #7 vascular consulted for a [place] placement  #8 dialysis later today with 2 L UF as tolerated  #9 ANNA and send iron studies  #10 check phosphorus PTH  #11 supportive care per primary team    Discussed with patient his wife and Dr. Oneida Pearson    Please call with questions,    Brigida Betancourt MD [facility]  Cell [phone]  Pager: [phone]       HPI: Amita Patel is a 80 y.o. male BLACK/ with history of diastolic congestive heart failure, pulmonary hypertension, history of hyper homocystinemia, CKD 5 follows with me in my office in my view, progressive decline in renal functions over the last few months multiple visits to the emergency room for falls, history of CVA with multiple infarcts right greater than left, presented with progressive shortness of breath. He has intermittent worsening exertional dyspnea. No fevers chills cough chest pain. Notably no oxygen at home. He had been on diuretics but had not been peeing much. On presentation patient had noted to be on low saturations of 80s and was therefore started on BiPAP at [facility]. Patient was then transferred to the [facility] emergency and subsequently admitted to medicine.   Nephrology was consulted for worsening renal parameters and fluid overload possible need for dialysis       Past Medical History:   Diagnosis Date    Arthritis     Chronic constipation     Gout     High cholesterol     Hypertension       Past Surgical History:   Procedure Laterality Date    HX HEENT      tonsillectomy cyst removed from under tongue    HX HERNIA REPAIR      Robot-assisted laparoscopic right inguinal hernia       Social History     Socioeconomic History    Marital status:      Spouse name: Not on file    Number of children: Not on file    Years of education: Not on file    Highest education level: Not on file   Occupational History    Not on file   Tobacco Use    Smoking status: Former    Smokeless tobacco: Former     Quit date: 3/11/1995   Substance and Sexual Activity    Alcohol use: No    Drug use: No    Sexual activity: Not on file   Other Topics Concern    Not on file   Social History Narrative    Not on file     Social Determinants of Health     Financial Resource Strain: Not on file   Food Insecurity: Not on file   Transportation Needs: Not on file   Physical Activity: Not on file   Stress: Not on file   Social Connections: Not on file   Intimate Partner Violence: Not on file   Housing Stability: Not on file History reviewed. No pertinent family history. No Known Allergies     Home Medications:   (Not in a hospital admission)      Current Inpatient Medications:     Current Facility-Administered Medications   Medication Dose Route Frequency    sodium chloride (NS) flush 5-40 mL  5-40 mL IntraVENous Q8H    sodium chloride (NS) flush 5-40 mL  5-40 mL IntraVENous PRN    acetaminophen (TYLENOL) tablet 650 mg  650 mg Oral Q6H PRN    Or    acetaminophen (TYLENOL) suppository 650 mg  650 mg Rectal Q6H PRN    polyethylene glycol (MIRALAX) packet 17 g  17 g Oral DAILY PRN    ondansetron (ZOFRAN ODT) tablet 4 mg  4 mg Oral Q8H PRN    Or    ondansetron (ZOFRAN) injection 4 mg  4 mg IntraVENous Q6H PRN    allopurinoL (ZYLOPRIM) tablet 100 mg  100 mg Oral DAILY    [START ON 12/21/2022] clopidogreL (PLAVIX) tablet 75 mg  75 mg Oral DAILY    carvediloL (COREG) tablet 25 mg  25 mg Oral BID WITH MEALS    [START ON 12/21/2022] atorvastatin (LIPITOR) tablet 40 mg  40 mg Oral DAILY    sodium chloride (NS) flush 5-40 mL  5-40 mL IntraVENous Q8H    sodium chloride (NS) flush 5-40 mL  5-40 mL IntraVENous PRN     Current Outpatient Medications   Medication Sig    predniSONE (STERAPRED DS) 10 mg dose pack 6 day dose pack per package instructions    ondansetron hcl (Zofran) 4 mg tablet Take 1 Tablet by mouth every eight (8) hours as needed for Nausea. (Patient not taking: Reported on 10/22/2022)    nitroglycerin (NITROSTAT) 0.4 mg SL tablet     ferrous sulfate (IRON) 325 mg (65 mg iron) EC tablet 1 tab(s) (Patient not taking: Reported on 10/22/2022)    clopidogreL (PLAVIX) 75 mg tab     allopurinoL (ZYLOPRIM) 100 mg tablet Take  by mouth daily. amLODIPine (NORVASC) 10 mg tablet Take  by mouth daily. atorvastatin (LIPITOR) 40 mg tablet Take  by mouth daily. carvediloL (COREG) 25 mg tablet Take 25 mg by mouth two (2) times daily (with meals). doxazosin (CARDURA) 4 mg tablet Take  by mouth daily.     furosemide (LASIX) 20 mg tablet Take  by mouth daily. hydrALAZINE (APRESOLINE) 50 mg tablet Take 25 mg by mouth three (3) times daily. loratadine (CLARITIN) 10 mg tablet Take 10 mg by mouth. (Patient not taking: Reported on 10/22/2022)    acetaminophen (TYLENOL) 325 mg tablet Take 2 Tabs by mouth every six (6) hours as needed for Pain. (Patient not taking: Reported on 10/22/2022)       Review of Systems:   No fever or chills. No sore throat. No cough or hemoptysis. No nausea, vomiting, abdominal pain, melena or hematochezia. No constipation or diarrhea. No dysuria, no gross hematuria of voiding difficulties. No ankle swelling, no joint paints. No muscle aches. No skin changes. No dizziness or lightheadedness. No headaches. Physical Assessment:     Vitals:    12/20/22 1244 12/20/22 1259 12/20/22 1447 12/20/22 1528   BP: (!) 150/80 (!) 140/82 (!) 140/82    Pulse: 78 90     Resp: 14 27     Temp:       SpO2: 92% 92%  96%   Weight:   61.2 kg (135 lb)    Height:   5' 4\" (1.626 m)      Last 3 Recorded Weights in this Encounter    12/19/22 0710 12/20/22 1447   Weight: 61.2 kg (135 lb) 61.2 kg (135 lb)     Admission weight: Weight: 61.2 kg (135 lb) (12/19/22 0710)      Intake/Output Summary (Last 24 hours) at 12/20/2022 1536  Last data filed at 12/20/2022 0352  Gross per 24 hour   Intake --   Output 500 ml   Net -500 ml       Patient is in no apparent distress. HEENT: mmm  Lungs: nicole rales   Cardiovascular system: S1, S2, regular rate and rhythm. No murmurs, gallops or rubs. No jvd. Carotid upstroke 2 + bilaterally. Abdomen: soft, non tender, non distended. Positive bowel sounds. No hepatosplenomegaly. No abdominal bruits. Extremities: no clubbing, cyanosis or edema.       Data Review:    Labs: Results:       Chemistry Recent Labs     12/19/22  0708   *      K 3.7      CO2 23   BUN 95*   CREA 5.04*   CA 9.0   AGAP 10   BUCR 19   AP 91   TP 6.7   ALB 3.0*   GLOB 3.7   AGRAT 0.8         CBC w/Diff Recent Labs 12/19/22  0708   WBC 6.2   RBC 3.03*   HGB 8.6*   HCT 27.7*      GRANS 80*   LYMPH 11*   EOS 2         Iron/Ferritin No results for input(s): IRON in the last 72 hours. No lab exists for component: TIBCCALC   PTH/VIT D No results for input(s): PTH in the last 72 hours.     No lab exists for component: VITD           Tremayne Escobar MD  12/20/2022  3:36 PM      December 20, 2022

## 2022-12-20 NOTE — ED NOTES
Patient is complaining of spitting up brown mucous. Family at bedside, inquiring about plan of care and morning medications for his heart and gout. Dr. Fartun Harrison paged.

## 2022-12-20 NOTE — ED NOTES
RT called for SpO2 maintaining at 85%. Patient alert and oriented x 4. No complaints of discomfort or shortness of breath.

## 2022-12-20 NOTE — PROGRESS NOTES
Danielle Talavera  Appointment: 2022 2:00 PM  Location: [facility] Office  Patient #: [id number]  : 1940  Undefined / Language: [place] / Race: Black or   Male      History of Present Illness Juan Niño MD; 2022 11:49 PM)  The patient is a 80year old male who presents for a Recheck of Chronic kidney disease. This is classified as stage 4. Note: Patient is a 27-year-old male with history of essential hypertension, diastolic congestive heart failure with pulmonary hypertension, history of hyper homocystinemia, aortic atherosclerotic disease who has been referred from PCPs office for evaluation of chronic kidney disease. Patient has had progressive CKD with baseline creatinine in the 3 range. EGFR of less than 20. He has had significant weight loss over the last few months. He continues to have good appetite. In February he had multiple visits to the emergency room for fall as he was getting out of the bed. 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 Patient had seen cardiology in  and he is upon being restarted on Lasix to 40 mg twice daily. His blood pressure appears to be in good range. Denies any urinary symptoms but does have extremity edema and exertional dyspnea.     Interval history  denies urinary symptoms  ventral hernia  creat worse at 4.08 , gfr 17  K 4.0  Co2 28  no edema , no urinary symptoms,  Lasix 20 mg BID as needed  BP better  appetite ok  sleep ok  renal US : nicole atrophic kidney  /60    Problem List/Past Medical (Krissy Whitney; 2022 1:10 PM)  Essential hypertension (I10)    Vitamin D deficiency due to chronic kidney disease (E55.9)    Chronic kidney disease, stage 4 (severe) (N18.4)   stage 4  Pulmonary hypertension (I27.20)    Congestive heart failure (I50.9)    Allergic rhinitis (J30.9)    Dyslipidemia (E78.5)    Anemia (D64.9)    Aortic atherosclerosis (I70.0)    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 stopped 20+ years  No Drug Use    Non Drinker/No Alcohol Use      Medication History (Jeanine Elaine; 9/22/2022 1:11 PM)  hydrALAZINE HCl  (100MG Tablet, 1 tablet Oral three times daily, Taken starting 01/21/2022) Active. Zofran  (4MG Tablet, Oral as needed) Active. Clopidogrel Bisulfate  (75MG Tablet, 1 Oral daily) Active. traMADol HCl  (50MG Tablet, Oral as needed) Active. Ciprofloxacin  (500MG Tablet, 1 Oral two times daily) Active. metroNIDAZOLE  (500MG Tablet, 1 Oral two times daily) Active. Carvedilol  (25MG Tablet, 1 Oral daily) Active. Ferrous Sulfate  (325 (65 Fe)MG Tablet, 1 Oral daily) Active. Voltaren  (1% Gel, External as directed) Active. Tylenol  (325MG Tablet, 2 Oral every 6 hours as needed for pain) Active. Hydrocortisone Ace-Pramoxine  (2.5-1% Cream, External 3 times a day) Active. Pravastatin Sodium  (40MG Tablet, 1 Oral nightly) Active. Kenalog  (0.1% Cream, External) Active. Flonase  (50MCG/DOSE Inhaler, 2 sprays Nasal both nostrils route daily) Active. Cyanocobalamin  (1000MCG Tablet, 1 Oral daily) Active. Multi-Vitamin  (1 Oral daily) Active. Omega-3  (1000MG Capsule, 1 Oral daily) Active. Allopurinol  (100MG Tablet, 1 Oral daily) Active. amLODIPine Besylate  (10MG Tablet, 1 Oral daily) Active. Furosemide  (20MG Tablet, 1 Oral two times daily) Active. Doxazosin Mesylate  (4MG Tablet, 1 Oral at bedtime) Active. Medications Reconciled     Health Maintenance History (Raphael Amezcua; 9/22/2022 1:11 PM)  Colonoscopy, Screening   [06/2018]: Normal.  Flu Vaccine   [11/2021]:  Pneumovax   Refused.   covid 19 vaccine dates: 06/02/2021, 06/23/2021 & 01/05/2022 Alvaro Vásquez)          Review of Systems Vince Hernandez MD; 9/22/2022 11:49 PM)  General Not Present- Anorexia, Chills, Fatigue and Fever. Skin Not Present- Bruising, Pruritus, Rash and Ulcer. HEENT Not Present- Dry Mucous Membranes, Dysgeusia, Oral Ulcers, Periorbital Puffiness and Sore Throat. Respiratory Not Present- Cough, Difficulty Breathing on Exertion, Dyspnea and Hemoptysis. Cardiovascular Not Present- Chest Pain, Claudications, Orthopnea, Palpitations, Paroxysmal Nocturnal Dyspnea and Swelling of Extremities. Gastrointestinal Not Present- Abdominal Pain, Abdominal Swelling, Constipation, Diarrhea, Hematochezia, Melena, Nausea and Vomiting. Musculoskeletal Not Present- Joint Pain, Joint Redness, Joint Stiffness, Joint Swelling, Leg Cramps and Myalgia. Neurological Not Present- Dizziness, Headaches, Syncope and Trouble walking. Endocrine Not Present- Appetite Changes, Excessive Thirst, Polydipsia and Polyuria. Hematology Not Present- Easy Bruising and Excessive bleeding. Vitals (Arturo Pop; 9/22/2022 1:14 PM)  9/22/2022 1:11 PM  Weight: 132.5 lb   Height: 64 in   Height was reported by patient. Body Surface Area: 1.64 m²   Body Mass Index: 22.74 kg/m²    Pain Level: 0/10    Temp.: 98.6° F    Pulse: 45 (Regular)    Resp.: 12 (Unlabored)    P. OX: 98% (Room air)  BP: 110/60(Sitting, Left Arm, Standard)              Physical Exam Lurdes Epstein MD; 9/22/2022 11:49 PM)  General  General Appearance - Not in acute distress. Build & Nutrition - Well nourished and Well developed. Integumentary  General Characteristics  Overall examination of the patient's skin reveals - no rashes. Eye  Sclera/Conjunctiva - Bilateral - Nonicteric. [facility]  Mouth and Throat  Oral Cavity/Oropharynx - Gingiva - no ulcerations noted, No excessive growth of soft tissue present. Oral Mucosa - moist. Oropharynx - No presence of white exudate noted. Chest and Lung Exam  Auscultation  Breath sounds - Normal and Clear. Adventitious sounds - No Adventitious sounds.     Cardiovascular  Cardiovascular examination reveals  - on palpation PMI is normal in location and amplitude, no palpable S3 or S4. Normal cardiac borders. , normal heart sounds, regular rate and rhythm with no murmurs, carotid auscultation reveals no bruits, abdominal aorta auscultation reveals no bruits, normal pedal pulses bilaterally and no digital clubbing, cyanosis, edema, increased warmth or tenderness. Abdomen  Inspection  Inspection of the abdomen reveals - No Abnormal pulsations. Palpation/Percussion  Palpation and Percussion of the abdomen reveal - Soft, Non Tender, No hepatosplenomegaly and No Palpable abdominal masses. Auscultation  Auscultation of the abdomen reveals - Bowel sounds normal and No Abdominal bruits. Neurologic  Neurologic evaluation reveals  - alert and oriented x 3 with no impairment of recent or remote memory. Lymphatic  General Lymphatics  Description - No Cervical lymphadeopathy. Assessment & Plan Ren Vogt MD; 9/22/2022 11:51 PM)    Chronic kidney disease, stage 4 (severe) (N18.4) <[id number]>  Story: stage 4  Impression: . Michel Dorado #1 chronic kidney disease stage IV/5, etiology appears to be hypertension nephrosclerosis, congestive heart failure with cardiorenal syndrome. Creatinine 4.1 EGFR of 17 , patient has had sub nephrotic proteinuria < 1 gm, Monitor his blood pressure daily and log.  Avoid NSAIDs and other nephrotoxins  #2 Essential hypertension, uncontrolled  #3 Congestive heart failure, preserved EF but has diastolic dysfunction and pulmonary hypertension  #4 pulmonary hypertension  #6 atherosclerotic disease  #6 hyper homocystinemia  #7 anemia of chronic kidney disease  #8 secondary hyperparathyroidism    Plan:  #1 continue to monitor renal parameters closely  #2 monitor BP at home , log , goal < 130/80  #3 continue Lasix 20 mg twice daily as needed  #4 anemia of CKD at goal , follows with heme-onc for ANNA  #5 BMD labs not checked this time , check on f/u  #6 monitor blood pressure daily and log it  #7 dialysis education done ---> prefers home dialysis --> PD --> defer for tour  #8 avoid NSAIDs and other nephrotoxins  #10 referred to urology last visit for the left kidney complex cyst    F/U in office in 3 months with CKD labs    Current Plans  PTH INTACT (93237)  RENAL FUNCTION PANEL (75685)  SPOT PROTEIN URINE (36172)  SPOT URINE CREATININE(28479)    Essential hypertension (I10)    Current Plans  CBC (43715)    Vitamin D deficiency due to chronic kidney disease (E55.9)    Current Plans  VITAMIN D 25 LEVEL (37209)    Secondary hyperparathyroidism, renal (N25.81) <HCC23>    Current Plans  Started Calcitriol 0.25 MCG Oral Capsule, 1 (one) Capsule monday, wednesday & fridays, #45, 90 days starting 09/22/2022, Ref. x1.    Portal Access Education (Z71.9)    Current Plans  Pt Education - How to [address] using Patient Portal and 3rd Party Apps: discussed with patient and provided information.   Signed by Raven Brandt MD (9/22/2022 11:51 PM)

## 2022-12-20 NOTE — PROCEDURES
Procedure Note    Date of Surgery: 12/20/2022    [facility]   Location: [place] [address]  Surgeon(s): Truong Brown MD  Assistant(s): MATTY Mancia  Pre-operative Diagnosis: Acute Kidney Injury requiring urgent hemodialysis  Post-operative Diagnosis: Same as above  Procedure(s) Performed:  Non-tunneled dialysis catheter placement with ultrasound guidance  Anesthesia:  Local  Findings:  Catheter flushed and della well at conclusion of the procedure. Complications: None  Estimated Blood Loss:  Minimal  Tubes and Drains:  None    Procedure in Detail:   After informed consent was obtained, the patient was placed supine. A surgical time-out was performed. The patient was identified and the procedure verified. The right chest and neck was prepped with chlorhexidine and draped in a sterile manner. 1% Lidocaine was used to anesthetize the skin. The skin of the neck area was incised. Through the incision the right jugular vein was punctured under ultrasound guidance with an 18G needle. A J-wire was then placed into the jugular vein. Next, the needle was removed over the wire. 2 dilators were passed consecutively over the wire into the vein. Next, the 16 cm catheter was inserted into the internal jugular vein over the wire. The wire was then removed and the pigtail port was closed. The catheter was found to aspirate blood easily. All ports were flushed with saline. The catheter was sutured to the skin with3-0 and silk. Biopatch was applied to the catheter exit site. The surgical site was covered with a gauze and a Tegaderm. The patient tolerated the procedure well without complications. Stat CXR was ordered.     Lawanda Bernardo MD, FACS

## 2022-12-20 NOTE — CONSULTS
CONSULT NOTE  VASCULAR SPECIALISTS      PATIENT NAME: Mile Sykes     1940    DATE: 2022          HPI: This is a 80 y.o. male with a PMH of HTN and hyperlipidemia who presented with SOB. Patient with MADHU on advanced CKD and vascular consulted for a temporary dialysis catheter placement for HD per nephrology. No previous history of HD or catheters. He is right handed with no previous vascular accesses. Denies any N/V, fever or chills. Imaging:     none      Past Medical History:   Diagnosis Date    Arthritis     Chronic constipation     Gout     High cholesterol     Hypertension      Past Surgical History:   Procedure Laterality Date    HX HEENT      tonsillectomy cyst removed from under tongue    HX HERNIA REPAIR      Robot-assisted laparoscopic right inguinal hernia     No Known Allergies  No current facility-administered medications on file prior to encounter. Current Outpatient Medications on File Prior to Encounter   Medication Sig Dispense Refill    predniSONE (STERAPRED DS) 10 mg dose pack 6 day dose pack per package instructions 1 Dose Pack 0    ondansetron hcl (Zofran) 4 mg tablet Take 1 Tablet by mouth every eight (8) hours as needed for Nausea. (Patient not taking: Reported on 10/22/2022) 20 Tablet 0    nitroglycerin (NITROSTAT) 0.4 mg SL tablet       ferrous sulfate (IRON) 325 mg (65 mg iron) EC tablet 1 tab(s) (Patient not taking: Reported on 10/22/2022)      clopidogreL (PLAVIX) 75 mg tab       allopurinoL (ZYLOPRIM) 100 mg tablet Take  by mouth daily. amLODIPine (NORVASC) 10 mg tablet Take  by mouth daily. atorvastatin (LIPITOR) 40 mg tablet Take  by mouth daily. carvediloL (COREG) 25 mg tablet Take 25 mg by mouth two (2) times daily (with meals). doxazosin (CARDURA) 4 mg tablet Take  by mouth daily. furosemide (LASIX) 20 mg tablet Take  by mouth daily. hydrALAZINE (APRESOLINE) 50 mg tablet Take 25 mg by mouth three (3) times daily. loratadine (CLARITIN) 10 mg tablet Take 10 mg by mouth. (Patient not taking: Reported on 10/22/2022)      acetaminophen (TYLENOL) 325 mg tablet Take 2 Tabs by mouth every six (6) hours as needed for Pain. (Patient not taking: Reported on 10/22/2022) 20 Tab 0      Social History     Tobacco Use    Smoking status: Former    Smokeless tobacco: Former     Quit date: 3/11/1995   Substance Use Topics    Alcohol use: No     History reviewed. No pertinent family history. Review of Systems:   ROS       Visit Vitals  BP (!) 140/82   Pulse 90   Temp 97.5 °F (36.4 °C)   Resp 27   Ht 5' 4\" (1.626 m)   Wt 135 lb (61.2 kg)   SpO2 92%   BMI 23.17 kg/m²     No data recorded. Intake/Output Summary (Last 24 hours) at 12/20/2022 1434  Last data filed at 12/20/2022 0352  Gross per 24 hour   Intake --   Output 500 ml   Net -500 ml          PHYSICAL EXAMINATION:  General appearance: oriented to person, place, and time. NECK: supple and no masses  CARDIAC: normal rate, regular rhythm, normal S1, S2, no murmurs, rubs, clicks or gallops  PULMONARY: clear to auscultation, no wheezes, rales or rhonchi, symmetric air entry  ABDOMINAL: soft, nontender, nondistended, no masses or organomegaly  EXTREMITIES:   2+ and symmetric radial artery pulses    Hematology   No results found for this visit on 12/19/22 (from the past 12 hour(s)). APTT/INR   Lab Results   Component Value Date    APTT 32.9 06/05/2022    INR 1.1 06/05/2022      Chemistry   Chem 8+ iSTAT   Lab Results   Component Value Date    BUN 95 (H) 12/19/2022     12/19/2022    CO2 23 12/19/2022    HGB 8.6 (L) 12/19/2022    HCT 27.7 (L) 12/19/2022                     Assessment:   1. MADHU on advanced CKD      Plan:   1. Will place nika at bedside today for HD per nephrology   2. Consent obtained and patient/wife agree to proceed.         Thank you for allowing me to participate in the care of this patient, as it is always a pleasure to do so    Le Kelly CAROLE

## 2022-12-20 NOTE — ED NOTES
Spoke with Michelle Becker at [facility] to arrange medical transport to [facility] room [address] Ismael Balderrama [id number]

## 2022-12-20 NOTE — ED NOTES
I accepted the patient in transfer from the [facility] to [facility] because the patient has been on BiPAP for almost 30 hours. He really needs to be evaluated by nephrology for dialysis given the fact that he has worsening kidney disease. He has arrived on BiPAP. I have consulted Dr. Phillips from nephrology and he is evaluating the patient at this time.

## 2022-12-20 NOTE — PROGRESS NOTES
Patient seen and examined  MADHU on advanced CKD , cardiorenal syndrome , CHF, volume overload  Needs dialysis for volume and solute   Vascular consulted for uldall placement   HD today with UF   Dialysis nurse notified    Please call with questions    Nicola Weaver MD FASN  Cell [phone]  Pager: [phone]

## 2022-12-20 NOTE — H&P
Hospitalist Admission Note    NAME: Vince Urbina   :  1940   MRN:  [id number]     Date:  2022     Patient PCP: Earle Nieves MD  ________________________________________________________________________    My assessment of this patient's clinical condition and my plan of care is as follows. 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 Continue BiPAP, wean as tolerated. Continue IV diuresis but no significant response thus far. Was given bumex in HVED prior to transfer. Vascular surgery to place dialysis catheter. Plan for acute volume management with hemodialysis. Dialysis going forward to be determined by nephrology and further discussion with patient/spouse. Interested in PD per discussion with Dr. Benjie Carrizales.  2D echo to assess overall cardiac function. Resume home meds as appropriate. PT/OT evaluations, mobilize as tolerated. Further plan pending response to current treatment and overall clinical course. Assistance of consultants appreciated. Code Status: Full  DVT Prophylaxis: SCDs          Subjective:   CHIEF COMPLAINT: SOB    HISTORY OF PRESENT ILLNESS:     Abbie Cid is a 80 y.o.  male who presented to [facility] emergency department yesterday for evaluation of worsening shortness of breath. The patient states that his symptoms have been present for the past several days but acutely worsened yesterday which prompted him to present to [facility] ED. He has a known history of CKD 4 and creatinine is noted to be at least a point above his usual baseline. Patient was noted to be hypoxic and required initiation of BiPAP. BNP at that time was also noted to be in the 44K range.   He was given IV diuretic therapy but has not had any significant response and has continued to be hypoxic on NIV. Patient was subsequently transferred ED to ED and is seen in the emergency department here at [facility]. He reports feeling slightly more comfortable but continues to be hypoxic to the upper 80% range. Respiratory therapy evaluation has been obtained with adjustments made in BiPAP settings and improvement in his status has been noted. Patient is being referred for hospital admission for further evaluation and treatment. Past Medical History:   Diagnosis Date    Arthritis     Chronic constipation     Gout     High cholesterol     Hypertension         Past Surgical History:   Procedure Laterality Date    HX HEENT      tonsillectomy cyst removed from under tongue    HX HERNIA REPAIR      Robot-assisted laparoscopic right inguinal hernia       Social History     Tobacco Use    Smoking status: Former    Smokeless tobacco: Former     Quit date: 3/11/1995   Substance Use Topics    Alcohol use: No        History reviewed. No pertinent family history. No Known Allergies     Prior to Admission medications    Medication Sig Start Date End Date Taking? Authorizing Provider   predniSONE (STERAPRED DS) 10 mg dose pack 6 day dose pack per package instructions 10/23/22   Carmel Higgins MD   ondansetron hcl (Zofran) 4 mg tablet Take 1 Tablet by mouth every eight (8) hours as needed for Nausea. Patient not taking: Reported on 10/22/2022 6/5/22   Arun Ochoa DO   nitroglycerin (NITROSTAT) 0.4 mg SL tablet  2/18/22   Provider, Historical   ferrous sulfate (IRON) 325 mg (65 mg iron) EC tablet 1 tab(s)  Patient not taking: Reported on 10/22/2022    Provider, Historical   clopidogreL (PLAVIX) 75 mg tab  12/16/21   Provider, Historical   allopurinoL (ZYLOPRIM) 100 mg tablet Take  by mouth daily. Provider, Historical   amLODIPine (NORVASC) 10 mg tablet Take  by mouth daily. Provider, Historical   atorvastatin (LIPITOR) 40 mg tablet Take  by mouth daily.     Provider, Historical carvediloL (COREG) 25 mg tablet Take 25 mg by mouth two (2) times daily (with meals). Provider, Historical   doxazosin (CARDURA) 4 mg tablet Take  by mouth daily. Provider, Historical   furosemide (LASIX) 20 mg tablet Take  by mouth daily. Provider, Historical   hydrALAZINE (APRESOLINE) 50 mg tablet Take 25 mg by mouth three (3) times daily. Provider, Historical   loratadine (CLARITIN) 10 mg tablet Take 10 mg by mouth. Patient not taking: Reported on 10/22/2022    Provider, Historical   acetaminophen (TYLENOL) 325 mg tablet Take 2 Tabs by mouth every six (6) hours as needed for Pain.   Patient not taking: Reported on 10/22/2022 4/5/19   MATTY Velasquez       REVIEW OF SYSTEMS:     Total of 12 systems reviewed as follows:       POSITIVE= bolded text  Negative = text not underlined  General:  fever, chills, sweats, generalized weakness, weight loss/gain,      loss of appetite, malaise  Eyes:    blurred vision, eye pain, loss of vision, double vision  ENT:    rhinorrhea, pharyngitis   Respiratory:   cough, sputum production, SOB, LEI, wheezing, pleuritic pain   Cardiology:   chest pain, palpitations, orthopnea, PND, edema, syncope   Gastrointestinal:  abdominal pain , N/V, diarrhea, dysphagia, constipation, bleeding   Genitourinary:  frequency, urgency, dysuria, hematuria, incontinence   Muskuloskeletal :  arthralgia, myalgia, back pain  Hematology:  easy bruising, nose or gum bleeding, lymphadenopathy   Dermatological: rash, ulceration, pruritis, color change / jaundice  Endocrine:   hot flashes or polydipsia   Neurological:  headache, dizziness, confusion, focal weakness, paresthesia,     Speech difficulties, memory loss, gait difficulty  Psychological: Feelings of anxiety, depression, agitation    Objective:   VITALS:    Visit Vitals  BP (!) 140/82   Pulse 90   Temp 97.5 °F (36.4 °C)   Resp 27   Ht 5' 4\" (1.626 m)   Wt 61.2 kg (135 lb)   SpO2 92%   BMI 23.17 kg/m²       PHYSICAL EXAM:    General: In NAD. Nontoxic-appearing. HEENT: Head NCAT. Pupils equal round sclerae anicteric, EOMI. OP clear. Neck:  Supple, trachea midline. Lungs:   Clear, no wheezes. Effort nonlabored, no accessory muscle use. Chest wall:  No chest wall tenderness. Chest expansion equal and symmetrical bilaterally. Heart:   RRR. No JVD. Abdomen:   Soft, NT/ND. Bowel sounds normoactive. Extremities: Warm, no pitting edema. No evidence for ischemia. Skin:     Not pale. Not Jaundiced  No rashes   Psych:  Mood normal.  Calm, no agitation. Neurologic: Awake and alert, moves extremities spontaneously. _______________________________________________________________________  Care Plan discussed with:    Comments   Patient X    Family      RN     Care Manager                    Consultant:      _______________________________________________________________________  Expected  Disposition:   Home     HH/PT/OT/RN X   SNF/LTC    ARU    ________________________________________________________________________    Tests/Procedures:   CT chest:  COMPARISON:       - No CT chest previously. - Chest x-ray 12/19/22, 03/11/20.  - CT abdomen-pelvis 08/11/22. TECHNIQUE:  Helically scanned volumetric axial sections of the chest are  obtained without IV contrast administration. Coronal and sagittal multiplanar  reconstruction images are generated for improved anatomic delineation. Radiation dose optimization techniques are utilized as appropriate to the exam,  with combination of automated exposure control, adjustment of the mA and/or kV  according to patient's size (including appropriate matching for site-specific  examinations), or use of iterative reconstruction technique. FINDINGS:      Lungs, Airways, Pleura:     - Mild to moderate pleural effusion bilaterally with associated atelectatic  changes particularly in the posterior aspects of both lower lobes.   - Some interlobular septal thickening, most pronounced in the lung apices and in  the right middle lobe and lingula regions. - Slight groundglass densities in the central upper lobes and in the superior  segments of both lower lobes. - No dominant mass or discrete suspicious lung nodule. - Some emphysematous changes bilaterally. - Slight peribronchial thickening. Cardiovascular:  Pronounced calcific densities at the aortic valve. Moderate to pronounced atherosclerotic calcifications of the coronary arteries. No significant dilation of the ascending aorta. Moderate atherosclerotic  calcifications of the aorta. Mediastinum:  Slight janelle prominence. The most prominent of the mediastinal  nodes is identified in the precarinal region measuring about 2.2 x 1.5 cm (axial  #25). Base of Neck, Chest Wall, Axillae:  No adenopathy. Esophagus:  Unremarkable. Upper Abdomen:  3.2 x 3.0 cm cyst at the left kidney upper pole (axial #55). Exophytic structure in the right kidney incompletely visualized on current study  measuring 1.3 x 1.2 cm (axial #62). There may be another hypodensity more  medially, measuring about 1.1 x 0.9 cm (axial #61). Bones:  No lytic lesions are acute bony abnormalities. IMPRESSION             1.  Bilateral pleural effusion with associated atelectatic changes. 2.  Aortic valve with pronounced calcifications. 3.  Some interlobular septal thickening and slight ground glass densities. In  light of the aortic valve calcifications, early manifestations of congestive  heart failure may be suspected. 4.  Renal hypodensities, favor renal cysts. 5.  Nonspecific slight precarinal janelle prominence.           CXR:  Comparison: March 2020     Findings:     Lines/Tubes/Devices:  None  LUNGS: Increased interstitial markings diffusely suggesting pulmonary edema     Focal volume loss in the right lung base suggestive of some developing  atelectasis and/or pneumonia   MEDIASTINUM: Globular cardiac silhouette enlargement question pericardial  effusion  BONES/SOFT TISSUES: Unremarkable     IMPRESSION  :     1.  Cardiomegaly question pericardial effusion     Moderate congestive failure changes     Question developing right lower lobe pneumonia        LAB DATA REVIEWED:    Recent Results (from the past 24 hour(s))   TROPONIN-HIGH SENSITIVITY    Collection Time: 12/19/22  3:18 PM   Result Value Ref Range    Troponin-High Sensitivity 491 (HH) 0 - 78 ng/L       Rosie Lozano MD  [address] [place] [address]      Disclaimer: Sections of this note are dictated utilizing voice recognition software and minor typographical errors may be present. If questions arise, please do not hesitate to contact me or call our department.

## 2022-12-20 NOTE — ED NOTES
Offered patient water and he asked to wipe his mouth. Patient Bi-Pap mask returned to face. No alarms noted with machine.  Patient comfortable and denies pain at this time

## 2022-12-20 NOTE — ED NOTES
Patient currently resting in room with no signs of work of breathing. Nurse changed patient socks per patient request. Patient asked for more water, nurse educated patient on other methods to keep mouth moist, due to being on Bi-PAP at this time. Patient verbalized understanding.

## 2022-12-21 LAB
ANION GAP SERPL CALC-SCNC: 9 MMOL/L (ref 3–18)
ARTERIAL PATENCY WRIST A: ABNORMAL
BASE EXCESS BLD CALC-SCNC: 2.7 MMOL/L
BASOPHILS # BLD: 0 K/UL (ref 0–0.1)
BASOPHILS NFR BLD: 0 % (ref 0–2)
BDY SITE: ABNORMAL
BUN SERPL-MCNC: 48 MG/DL (ref 7–18)
BUN/CREAT SERPL: 17 (ref 12–20)
CALCIUM SERPL-MCNC: 9.3 MG/DL (ref 8.5–10.1)
CALCIUM SERPL-MCNC: 9.3 MG/DL (ref 8.5–10.1)
CHLORIDE SERPL-SCNC: 112 MMOL/L (ref 100–111)
CO2 SERPL-SCNC: 25 MMOL/L (ref 21–32)
CREAT SERPL-MCNC: 2.9 MG/DL (ref 0.6–1.3)
DIFFERENTIAL METHOD BLD: ABNORMAL
EOSINOPHIL # BLD: 0 K/UL (ref 0–0.4)
EOSINOPHIL NFR BLD: 0 % (ref 0–5)
ERYTHROCYTE [DISTWIDTH] IN BLOOD BY AUTOMATED COUNT: 19.8 % (ref 11.6–14.5)
GAS FLOW.O2 O2 DELIVERY SYS: ABNORMAL L/MIN
GLUCOSE BLD STRIP.AUTO-MCNC: 160 MG/DL (ref 70–110)
GLUCOSE SERPL-MCNC: 99 MG/DL (ref 74–99)
HBV SURFACE AB SER QL IA: NEGATIVE
HBV SURFACE AB SERPL IA-ACNC: 9.78 MIU/ML
HBV SURFACE AG SER QL: <0.1 INDEX
HBV SURFACE AG SER QL: NEGATIVE
HCO3 BLD-SCNC: 24.6 MMOL/L (ref 22–26)
HCT VFR BLD AUTO: 26.9 % (ref 36–48)
HEP BS AB COMMENT,HBSAC: ABNORMAL
HGB BLD-MCNC: 8.4 G/DL (ref 13–16)
IMM GRANULOCYTES # BLD AUTO: 0 K/UL (ref 0–0.04)
IMM GRANULOCYTES NFR BLD AUTO: 0 % (ref 0–0.5)
IRON SATN MFR SERPL: 8 % (ref 20–50)
IRON SERPL-MCNC: 17 UG/DL (ref 50–175)
LYMPHOCYTES # BLD: 0.7 K/UL (ref 0.9–3.6)
LYMPHOCYTES NFR BLD: 10 % (ref 21–52)
MCH RBC QN AUTO: 28.6 PG (ref 24–34)
MCHC RBC AUTO-ENTMCNC: 31.2 G/DL (ref 31–37)
MCV RBC AUTO: 91.5 FL (ref 78–100)
MONOCYTES # BLD: 0.5 K/UL (ref 0.05–1.2)
MONOCYTES NFR BLD: 7 % (ref 3–10)
NEUTS SEG # BLD: 6 K/UL (ref 1.8–8)
NEUTS SEG NFR BLD: 82 % (ref 40–73)
NRBC # BLD: 0 K/UL (ref 0–0.01)
NRBC BLD-RTO: 0 PER 100 WBC
O2/TOTAL GAS SETTING VFR VENT: 44 %
PCO2 BLD: 28.4 MMHG (ref 35–45)
PH BLD: 7.54 [PH] (ref 7.35–7.45)
PLATELET # BLD AUTO: 148 K/UL (ref 135–420)
PMV BLD AUTO: 10.9 FL (ref 9.2–11.8)
PO2 BLD: 44 MMHG (ref 80–100)
POTASSIUM SERPL-SCNC: 3.5 MMOL/L (ref 3.5–5.5)
PTH-INTACT SERPL-MCNC: 191.4 PG/ML (ref 18.4–88)
RBC # BLD AUTO: 2.94 M/UL (ref 4.35–5.65)
SAO2 % BLD: 86.2 % (ref 92–97)
SERVICE CMNT-IMP: ABNORMAL
SODIUM SERPL-SCNC: 146 MMOL/L (ref 136–145)
SPECIMEN TYPE: ABNORMAL
TIBC SERPL-MCNC: 217 UG/DL (ref 250–450)
TOTAL RESP. RATE, ITRR: 23
WBC # BLD AUTO: 7.3 K/UL (ref 4.6–13.2)

## 2022-12-21 PROCEDURE — 36600 WITHDRAWAL OF ARTERIAL BLOOD: CPT

## 2022-12-21 PROCEDURE — 94761 N-INVAS EAR/PLS OXIMETRY MLT: CPT

## 2022-12-21 PROCEDURE — 94660 CPAP INITIATION&MGMT: CPT

## 2022-12-21 PROCEDURE — 82803 BLOOD GASES ANY COMBINATION: CPT

## 2022-12-21 PROCEDURE — 82962 GLUCOSE BLOOD TEST: CPT

## 2022-12-21 PROCEDURE — 99232 SBSQ HOSP IP/OBS MODERATE 35: CPT | Performed by: FAMILY MEDICINE

## 2022-12-21 PROCEDURE — 65660000004 HC RM CVT STEPDOWN

## 2022-12-21 PROCEDURE — 74011000250 HC RX REV CODE- 250: Performed by: FAMILY MEDICINE

## 2022-12-21 PROCEDURE — 74011250637 HC RX REV CODE- 250/637: Performed by: FAMILY MEDICINE

## 2022-12-21 PROCEDURE — 74011250636 HC RX REV CODE- 250/636: Performed by: INTERNAL MEDICINE

## 2022-12-21 PROCEDURE — 80048 BASIC METABOLIC PNL TOTAL CA: CPT

## 2022-12-21 PROCEDURE — 77010033711 HC HIGH FLOW OXYGEN

## 2022-12-21 PROCEDURE — 74011000250 HC RX REV CODE- 250: Performed by: EMERGENCY MEDICINE

## 2022-12-21 PROCEDURE — 83540 ASSAY OF IRON: CPT

## 2022-12-21 PROCEDURE — 90935 HEMODIALYSIS ONE EVALUATION: CPT

## 2022-12-21 PROCEDURE — 85025 COMPLETE CBC W/AUTO DIFF WBC: CPT

## 2022-12-21 PROCEDURE — 94762 N-INVAS EAR/PLS OXIMTRY CONT: CPT

## 2022-12-21 RX ORDER — DOXERCALCIFEROL 4 UG/2ML
4 INJECTION INTRAVENOUS
Status: DISCONTINUED | OUTPATIENT
Start: 2022-12-21 | End: 2022-12-24 | Stop reason: HOSPADM

## 2022-12-21 RX ADMIN — BUMETANIDE 1 MG: 0.25 INJECTION INTRAMUSCULAR; INTRAVENOUS at 08:55

## 2022-12-21 RX ADMIN — EPOETIN ALFA-EPBX 10000 UNITS: 10000 INJECTION, SOLUTION INTRAVENOUS; SUBCUTANEOUS at 20:53

## 2022-12-21 RX ADMIN — SODIUM CHLORIDE, PRESERVATIVE FREE 10 ML: 5 INJECTION INTRAVENOUS at 21:41

## 2022-12-21 RX ADMIN — SODIUM CHLORIDE, PRESERVATIVE FREE 10 ML: 5 INJECTION INTRAVENOUS at 03:08

## 2022-12-21 RX ADMIN — ATORVASTATIN CALCIUM 40 MG: 40 TABLET, FILM COATED ORAL at 08:54

## 2022-12-21 RX ADMIN — ALLOPURINOL 100 MG: 100 TABLET ORAL at 08:54

## 2022-12-21 RX ADMIN — CARVEDILOL 25 MG: 25 TABLET, FILM COATED ORAL at 18:01

## 2022-12-21 RX ADMIN — DOXERCALCIFEROL 4 MCG: 4 INJECTION, SOLUTION INTRAVENOUS at 14:17

## 2022-12-21 RX ADMIN — CLOPIDOGREL BISULFATE 75 MG: 75 TABLET ORAL at 08:55

## 2022-12-21 RX ADMIN — SODIUM CHLORIDE, PRESERVATIVE FREE 10 ML: 5 INJECTION INTRAVENOUS at 08:55

## 2022-12-21 RX ADMIN — BUMETANIDE 1 MG: 0.25 INJECTION INTRAMUSCULAR; INTRAVENOUS at 18:01

## 2022-12-21 RX ADMIN — SODIUM CHLORIDE, PRESERVATIVE FREE 10 ML: 5 INJECTION INTRAVENOUS at 06:00

## 2022-12-21 RX ADMIN — CARVEDILOL 25 MG: 25 TABLET, FILM COATED ORAL at 08:54

## 2022-12-21 RX ADMIN — SODIUM CHLORIDE, PRESERVATIVE FREE 10 ML: 5 INJECTION INTRAVENOUS at 03:07

## 2022-12-21 RX ADMIN — EPOETIN ALFA-EPBX 10000 UNITS: 10000 INJECTION, SOLUTION INTRAVENOUS; SUBCUTANEOUS at 14:22

## 2022-12-21 RX ADMIN — SODIUM CHLORIDE, PRESERVATIVE FREE 10 ML: 5 INJECTION INTRAVENOUS at 07:00

## 2022-12-21 NOTE — PROGRESS NOTES
completed the initial Spiritual Assessment of the patient, and offered Pastoral Care support with the patient in bed 11. Patient appears very weak but is still talking. There is no advance directive. Patient does not have any Scientologist/cultural needs that will affect patients preferences in health care. Chaplains will continue to follow and will provide pastoral care on an as needed/requested basis.     Tiffany Munoz  [facility] Care Department  [phone]

## 2022-12-21 NOTE — ACP (ADVANCE CARE PLANNING)
Advance Care Planning   Advance Care Planning Inpatient Note  [address] [place] [address]   Spiritual Care Department    Today's Date: 12/21/2022  Unit: [facility] EMERGENCY DEPT    Received request from . Upon review of chart and communication with care team, patient's decision making abilities are not in question. Patient was/were present in the room during visit. Goals of ACP Conversation:  Discuss Advance Care planning documents    Health Care Decision Makers:    No healthcare decision makers have been documented. Click here to complete [address] including selection of the Healthcare Decision Maker Relationship (ie \"Primary\")  Summary:  No Decision Maker named by patient at this time    Advance Care Planning Documents (Patient Wishes) on file:  None     Assessment:    Patient seen in bed 11 of the emergency room where he will be admitted to the hospital from. Patient appears rather weak but still says that he is fine. There is no advance directive present.     Interventions:  Deferred conversation as patient not interested in completing an advance directive at this time    Care Preferences Communicated:  No    Outcomes/Plan:      Serene [facility]far[facility] on 12/21/2022 at 11:10 AM

## 2022-12-21 NOTE — PROGRESS NOTES
In Patient Progress note      Admit Date: 12/19/2022    Impression:   #1 ESRD , now needing dialysis for volume overload and solute management   #2 Essential hypertension, uncontrolled  #3 AC on chronic Congestive heart failure, preserved EF but has diastolic dysfunction   and pulmonary hypertension  #4 severe pulmonary hypertension  #6 atherosclerotic disease  #6 h/o  homocystinemia  #7 anemia of chronic kidney disease  #8 secondary hyperparathyroidism     Plan:  #1 monitor strict I/o charting, Hernandez catheter in place  #2 IUF today , HD tomorrow   #3 once stabilized and setup outpatient HD will initiate PD --> discussed with patient   #4 requested vascular for TDC   #5 ANNA and venofer ( TSAT 8%)  #10 phosphorus PTH ok  #11 supportive care per primary team    Outpatient HD placement in process  Plan to transition to PD at later stage outpatient after stabilized  on HD      Discussed with patient his wife and Dr. Tana Subramanian     Please call with questions,     Nicola Weaver MD FASN  Cell [phone]  Pager: [phone]     Subjective:     - No acute over night events. - respiratory - stable  - hemodynamics - stable, no pressrs  - UOP-minimal  - Nutrition -ok    Objective:     Visit Vitals  BP (!) 141/76   Pulse 91   Temp 98.7 °F (37.1 °C) (Oral)   Resp 19   Ht 5' 4\" (1.626 m)   Wt 61.2 kg (135 lb)   SpO2 94%   BMI 23.17 kg/m²         Intake/Output Summary (Last 24 hours) at 12/21/2022 1319  Last data filed at 12/21/2022 0120  Gross per 24 hour   Intake --   Output 2450 ml   Net -2450 ml       Physical Exam:     Patient is in no apparent distress. HEENT: mmm  Lungs: nicole rales   Cardiovascular system: S1, S2, regular rate and rhythm. No murmurs, gallops or rubs. No jvd. Carotid upstroke 2 + bilaterally. Abdomen: soft, non tender, non distended. Positive bowel sounds. No hepatosplenomegaly. No abdominal bruits. Extremities: no clubbing, cyanosis or edema.     Data Review:    Recent Labs     12/21/22  0548   WBC 7.3 RBC 2.94*   HCT 26.9*   MCV 91.5   MCH 28.6   MCHC 31.2   RDW 19.8*     Recent Labs     12/21/22  0548 12/20/22  2220 12/19/22  0708   BUN 48*  --  95*   CREA 2.90*  --  5.04*   CA 9.3 9.3 9.0   ALB  --   --  3.0*   K 3.5  --  3.7   *  --  143   *  --  110   CO2 25  --  23   PHOS  --  4.4  --    GLU 99  --  204*       Carmencita Marrero MD

## 2022-12-21 NOTE — PROGRESS NOTES
12/21/22 0427   Oxygen Therapy   O2 Sat (%) (!) 88 %   O2 Device Nasal cannula   O2 Flow Rate (L/min) 6 l/min   CPAP/BIPAP   CPAP/BIPAP Start/Stop Off   Pt wean off Bipap and place on nasal cannula. Spo2 87-88%. ABG obtained. HFNC initiate.

## 2022-12-21 NOTE — PROGRESS NOTES
12/21/22 0459   Oxygen Therapy   O2 Sat (%) 95 %   Pulse via Oximetry 86 beats per minute   O2 Device Heated; Hi flow nasal cannula   O2 Flow Rate (L/min) 40 l/min   O2 Temperature 91.4 °F (33 °C)   FIO2 (%) 100 %   Spo2 improved on HFNC. Pt denies SOB. MD is aware.

## 2022-12-21 NOTE — PROGRESS NOTES
[facility] Hospitalists  Progress Note    Patient: Laney Ruvalcaba Age: 80 y.o. : 1940 MR#: [id number] SSN: [id number]  Date: 2022     Subjective/24-hour events:     Patient remains roomed in ED. Tolerated dialysis yesterday without difficulty. Reports that respiratory status has improved but continues to remain on high flow nasal cannula. Has remained stable off of BiPAP. 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 Continue IV diuresis, HD per nephrology. TDC placement per vascular surgery. Cardiology evaluation given reduced EF. Mobilize as much as able. Disposition TBD. Case discussed with:  [x]Patient  []Family  [x]Nursing  [x]Case Management  DVT Prophylaxis:  []Lovenox  []Hep SQ  [x]SCDs  []Coumadin   []On Heparin gtt    Objective:   VS: Visit Vitals  BP (!) 140/84   Pulse 90   Temp 97.8 °F (36.6 °C) (Axillary)   Resp 17   Ht 5' 4\" (1.626 m)   Wt 61.2 kg (135 lb)   SpO2 97%   BMI 23.17 kg/m²      Tmax/24hrs: Temp (24hrs), Av.7 °F (36.5 °C), Min:97.5 °F (36.4 °C), Max:97.8 °F (36.6 °C)    Intake/Output Summary (Last 24 hours) at 2022 1037  Last data filed at 2022 0120  Gross per 24 hour   Intake --   Output 2900 ml   Net -2900 ml       General:  In NAD. Nontoxic-appearing. Cardiovascular:  RRR. Pulmonary:  Lungs clear bilaterally, no wheezes. No accessory muscle use. GI:  Abdomen soft, NTTP. Extremities:  Warm, no edema or ischemia. Neuro:  Awake and alert.   Moves extremities spontaneously, motor grossly nonfocal.    Labs:    Recent Results (from the past 24 hour(s))   CULTURE, RESPIRATORY/SPUTUM/BRONCH W GRAM STAIN    Collection Time: 22  1:30 PM    Specimen: Sputum   Result Value Ref Range    Special Requests: NO SPECIAL REQUESTS      GRAM STAIN OCCASIONAL WBCS SEEN      GRAM STAIN OCCASIONAL EPITHELIAL CELLS SEEN      GRAM STAIN 2+ GRAM POSITIVE COCCI IN PAIRS      GRAM STAIN FEW GRAM POSITIVE COCCI IN CLUSTERS      Culture result: PENDING    ECHO ADULT COMPLETE    Collection Time: 12/20/22  4:55 PM   Result Value Ref Range    IVSd 1.3 (A) 0.6 - 1.0 cm    LVIDd 4.6 4.2 - 5.9 cm    LVIDs 4.6 cm    LVOT Diameter 2.2 cm    LVPWd 1.3 (A) 0.6 - 1.0 cm    LVOT Cardiac Output 4.7 liter/minute    LVOT Cardiac Output 4.7 liter/minute    LVOT Cardiac Output 4.2 liter/minute    LVOT Cardiac Output 4.2 liter/minute    LVOT Cardiac Output 4.5 liter/minute    LVOT Cardiac Output 4.5 liter/minute    LV Ejection Fraction A2C 38 %    LV Ejection Fraction A4C 32 %    EF BP 37 (A) 55 - 100 %    LV Ejection Fraction A2C 37 %    LV Ejection Fraction A4C 33 %    LV EDV A2C 167 mL    LV EDV A4C 179 mL    LV EDV  (A) 67 - 155 mL    LV ESV A2C 105 mL    LV ESV A4C 121 mL    LV ESV  (A) 22 - 58 mL    LVOT Peak Gradient 3 mmHg    LVOT Mean Gradient 1 mmHg    LVOT SV 72.9 ml    LVOT Peak Velocity 0.8 m/s    LVOT VTI 19.2 cm    RV Free Wall Peak S' 13 cm/s    LA Volume A/L 142 mL    LA Volume 2C 139 (A) 18 - 58 mL    LA Volume 4C 117 (A) 18 - 58 mL    AV Area by Planimetry 1.2 cm2    AV Area by Planimetry 1.2 cm2    AV Area by Peak Velocity 0.6 cm2    AV Area by VTI 0.7 cm2    AR .7 millisecond    AR Max Velocity PISA 3.6 m/s    AV Peak Gradient 92 mmHg    AV Mean Gradient 51 mmHg    AV Peak Velocity 4.8 m/s    AV Mean Velocity 3.4 m/s    AV .1 cm    MV Nyquist Velocity 31 cm/s    MV Regurg Velocity PISA 6.3 m/s    MR .5 cm    TAPSE 2.0 1.7 cm    TR Peak Gradient 72 mmHg    TR Max Velocity 4.23 m/s    Ascending Aorta 3.3 cm    Aortic Root 3.8 cm    Fractional Shortening 2D 0 28 - 44 %    LV ESV Index BP 68 mL/m2    LV EDV Index  mL/m2    LV ESV Index A4C 73 mL/m2    LV EDV Index A4C 108 mL/m2    LV ESV Index A2C 63 mL/m2    LV EDV Index A2C 101 mL/m2    LVIDd Index 2.77 cm/m2    LVIDs Index 2.77 cm/m2    LV RWT Ratio 0.57     LV Mass 2D 230.2 (A) 88 - 224 g    LV Mass 2D Index 138.6 (A) 49 - 115 g/m2    LA Volume Index A/L 86 16 - 34 mL/m2    LVOT Stroke Volume Index 43.9 mL/m2    LVOT Area 3.8 cm2    LA Volume Index 2C 84 (A) 16 - 34 mL/m2    LA Volume Index 4C 70 (A) 16 - 34 mL/m2    Ao Root Index 2.29 cm/m2    Ascending Aorta Index 1.99 cm/m2    AV Velocity Ratio 0.17     LVOT:AV VTI Index 0.17     SULAIMAN/BSA VTI 0.4 cm2/m2    SULAIMAN/BSA Peak Velocity 0.4 cm2/m2    RVSP 87 mmHg    Est. RA Pressure 15 mmHg    RA Area 4C 29.5 cm2    RV Basal Dimension 4.7 cm   PHOSPHORUS    Collection Time: 12/20/22 10:20 PM   Result Value Ref Range    Phosphorus 4.4 2.5 - 4.9 MG/DL   PTH INTACT    Collection Time: 12/20/22 10:20 PM   Result Value Ref Range    Calcium 9.3 8.5 - 10.1 MG/DL    PTH, Intact 191.4 (H) 18.4 - 88.0 pg/mL   HEP B SURFACE AB    Collection Time: 12/20/22 10:20 PM   Result Value Ref Range    Hepatitis B surface Ab 9.78 (L) >10.0 mIU/mL    Hep B surface Ab Interp. Negative (A) POS      Hep B surface Ab comment        Samples with a  value of 10 mIU/mL or greater are considered positive (protective immunity) in accordance with the CDC guidelines. HEP B SURFACE AG    Collection Time: 12/20/22 10:20 PM   Result Value Ref Range    Hepatitis B surface Ag <0.10 <1.00 Index    Hep B surface Ag Interp. Negative NEG     BLOOD GAS, ARTERIAL POC    Collection Time: 12/21/22  4:44 AM   Result Value Ref Range    Device: NASAL CANNULA      FIO2 (POC) 44 %    pH (POC) 7.54 (H) 7.35 - 7.45      pCO2 (POC) 28.4 (L) 35.0 - 45.0 MMHG    pO2 (POC) 44 (LL) 80 - 100 MMHG    HCO3 (POC) 24.6 22 - 26 MMOL/L    sO2 (POC) 86.2 (L) 92 - 97 %    Base excess (POC) 2.7 mmol/L    Allens test (POC) NOT APPLICABLE      Total resp.  rate 23      Site RIGHT BRACHIAL      Specimen type (POC) ARTERIAL      Performed by [id number] [facility]    Collection Time: 12/21/22 5:48 AM   Result Value Ref Range    Sodium 146 (H) 136 - 145 mmol/L    Potassium 3.5 3.5 - 5.5 mmol/L    Chloride 112 (H) 100 - 111 mmol/L    CO2 25 21 - 32 mmol/L    Anion gap 9 3.0 - 18 mmol/L    Glucose 99 74 - 99 mg/dL    BUN 48 (H) 7.0 - 18 MG/DL    Creatinine 2.90 (H) 0.6 - 1.3 MG/DL    BUN/Creatinine ratio 17 12 - 20      eGFR 21 (L) >60 ml/min/1.73m2    Calcium 9.3 8.5 - 10.1 MG/DL   CBC WITH AUTOMATED DIFF    Collection Time: 12/21/22  5:48 AM   Result Value Ref Range    WBC 7.3 4.6 - 13.2 K/uL    RBC 2.94 (L) 4.35 - 5.65 M/uL    HGB 8.4 (L) 13.0 - 16.0 g/dL    HCT 26.9 (L) 36.0 - 48.0 %    MCV 91.5 78.0 - 100.0 FL    MCH 28.6 24.0 - 34.0 PG    MCHC 31.2 31.0 - 37.0 g/dL    RDW 19.8 (H) 11.6 - 14.5 %    PLATELET 405 005 - 442 K/uL    MPV 10.9 9.2 - 11.8 FL    NRBC 0.0 0  WBC    ABSOLUTE NRBC 0.00 0.00 - 0.01 K/uL    NEUTROPHILS 82 (H) 40 - 73 %    LYMPHOCYTES 10 (L) 21 - 52 %    MONOCYTES 7 3 - 10 %    EOSINOPHILS 0 0 - 5 %    BASOPHILS 0 0 - 2 %    IMMATURE GRANULOCYTES 0 0.0 - 0.5 %    ABS. NEUTROPHILS 6.0 1.8 - 8.0 K/UL    ABS. LYMPHOCYTES 0.7 (L) 0.9 - 3.6 K/UL    ABS. MONOCYTES 0.5 0.05 - 1.2 K/UL    ABS. EOSINOPHILS 0.0 0.0 - 0.4 K/UL    ABS. BASOPHILS 0.0 0.0 - 0.1 K/UL    ABS. IMM.  GRANS. 0.0 0.00 - 0.04 K/UL    DF AUTOMATED     IRON PROFILE    Collection Time: 12/21/22  5:48 AM   Result Value Ref Range    Iron 17 (L) 50 - 175 ug/dL    TIBC 217 (L) 250 - 450 ug/dL    Iron % saturation 8 (L) 20 - 50 %       Signed By: Jennifer Mitchell MD     December 21, 2022

## 2022-12-21 NOTE — PROGRESS NOTES
1145 Received report from flores Lynn to dialyze - confirmed. 1155 Patient was received fully awake and reactive, with hi flow O2 in use. Comfortable on a high fowlers position in his stretcher. [address] Opal Alves rounded. No change noted with current orders.             ACUTE HEMODIALYSIS FLOW SHEET      HEMODIALYSIS ORDERS: Physician: Martha Hubbard      [x] Consent verified   Dialyzer: Revaclear   Duration: 3   BFR: 250  DFR: SEQ   Dialysate:  Temp 36-37*C   K+  3    Ca+ 2.5   Na 140  Bicarb 35   Wt Readings from Last 1 Encounters:        Patient Chart [x]   Unable to Obtain []  Dry weight/UF Goal:2000 ml   Heparin []  Bolus    Units    [] Hourly    Units    [x]None       Pre BP  142/75    Pulse: 86 Respirations: 26 Temp: 98.7 [] oral  [] Ax   Esoph   Labs: []  Pre  []  Post:   [x] N/A   Additional Orders (medications, blood products, hypotension management): [] Yes   [x] No     CATHETER ACCESS:  []N/A   [x]Right   []Left   [x]IJ   []Fem  []Chest wall  []TransHepatic   [] First use X-ray verified     []Tunnel    [x] Non Tunneled   [x]No S/S infection  []Redness  []Drainage []Cultured []Swelling []Pain   [x]Medical Aseptic Prep Utilized   []Dressing Changed  [] Biopatch  Date:    []Clotted   [x]Patent   Flows: [x]Good  []Poor  []Reversed   If access problem,  notified: []Yes    [x]N/A        GRAFT/FISTULA ACCESS:   [x]N/A     []Right     []Left     []UE     []LE   []AVG   []AVF       []Medical Aseptic Prep Utilized   []No S/S infection  []Redness  []Drainage [] Cultured  [] Swelling  [] Pain  Bruit:   [] Strong    [] Weak       Thrill :   [] Strong    [] Weak     Needle Gauge: 15   Length: 1 inch   If access problem,  notified: []Yes     [x]N/A                GENERAL ASSESSMENT:    LUNGS:  Resp Rate 26   [] Clear  [x] Coarse  [] Crackles  [] Wheezing  [] Diminished                                                           [] RLL   [] LLL  [x] RUL   [x] ARMIDA            Respirations:  [x]Easy  []Labored []N/A  Cough:  []Productive  []Dry  []N/A               Therapy:  []RA   [] Ventilated   [] Intubated   [] Trach            O2 Device:  [x] NC high flow    [] NRB  [] Trach Mask  [] BiPaP  Flow:   l/min                                                    CARDIAC: [] Regular      [x] Irregular   [] Rhythm:          [x] Monitored   [x] Bedside   [] Remotely monitored       EDEMA: [x] None   []Generalized  [] Pitting [] 1+   [] 2 +   [] 3+    [] 4+        SKIN:   [] Hot     [] Cold    [x] Warm   [x] Dry    [] Diaphoretic                 [] Flushed  [] Jaundiced  [] Cyanotic  [] Pale      LOC:    [x] Alert      [x]Oriented:    [x] Person     [x] Place   [x]Time               [] Confused  [] Lethargic  [] Medicated  [] Non-responsive  [] Non-Verbal     GI / ABDOMEN:                     [x] Flat    [] Distended    [x] Soft    [] Firm   []  Obese                   [] Diarrhea   [] FMS [x] Bowel Sounds audible [] Nausea  [] Vomiting                   [] NGT  [] OGT  [] PEG  [] Tube Feedings @     mL/hr     / URINE ASSESSMENT:                   [] Voiding    [] Oliguria  [] Anuria                     [x]  Hernandez   [] Incontinent  []  Incontinent Brief   []  PureWick     PAIN:  [x] 0 []1  []2   []3   []4   []5   []6   []7   []8   []9   []10                MOBILITY:  [x] Bed    [] Stretcher      All Vitals and Treatment Details on Attached [address]: [facility]          Room # GG45/48    [x] Routine         [] 1st Time Acute/Chronic   [] Urgent      [] Stat              [] Acute Room   []  Bedside    [] ICU/CCU     [x] ER     Isolation Precautions:  [x] Dialysis    There are currently no Active Isolations     ALLERGIES:     No Known Allergies     Code Status:  Full Code     Hepatitis Status      Lab Results   Component Value Date/Time    Hepatitis B surface Ag <0.10 12/20/2022 10:20 PM    Hepatitis B surface Ab 9.78 (L) 12/20/2022 10:20 PM        Current Labs:      Lab Results   Component Value Date/Time    WBC 7.3 12/21/2022 05:48 AM    HGB 8.4 (L) 12/21/2022 05:48 AM    HCT 26.9 (L) 12/21/2022 05:48 AM    PLATELET 910 41/53/1147 05:48 AM    MCV 91.5 12/21/2022 05:48 AM     Lab Results   Component Value Date/Time    Sodium 146 (H) 12/21/2022 05:48 AM    Potassium 3.5 12/21/2022 05:48 AM    Chloride 112 (H) 12/21/2022 05:48 AM    CO2 25 12/21/2022 05:48 AM    Anion gap 9 12/21/2022 05:48 AM    Glucose 99 12/21/2022 05:48 AM    BUN 48 (H) 12/21/2022 05:48 AM    Creatinine 2.90 (H) 12/21/2022 05:48 AM    BUN/Creatinine ratio 17 12/21/2022 05:48 AM    GFR est AA 17 (L) 09/16/2022 08:48 AM    GFR est non-AA 14 (L) 09/16/2022 08:48 AM    Calcium 9.3 12/21/2022 05:48 AM          DIET:  DIET ADULT     PRIMARY NURSE REPORT:   Pre Dialysis: MERISSA Holland RN    Time: 9793    EDUCATION:    [x] Patient           Knowledge Basis: []None []Minimal [x] Substantial [] Unknown  Barriers to learning  [x]None  [] Intubated/Trached/Ventilated  [] Sedated/Paralyzed   [x] Access Care     [] S&S of infection  [] Fluid Management  [x] K+   [] Procedural    [] Medications   [] Tx Options   [] Transplant   [] Diet      Teaching Tools:  [x] Explain  [] Demo  [] Handouts [] Video  Patient response: [x] Verbalized understanding   [] Requires follow up        [x] Time Out/Safety Check    [x] Extracorporeal Circuit Tested for integrity       RO/HEMODIALYSIS MACHINE SAFETY CHECKS - Before each treatment:        [address] [place] [address]                                     [] Unit Machine #  with centralized RO                                  [] Portable Machine #1/RO serial # [id number]                                  [] Portable Machine #2/RO serial # [id number]                                  [] Portable Machine #4/RO serial # [id number]                                  [x] Portable Machine #3/RO serial # [id number]                                                                                                       Alarm Test:  Pass time 9677         [x] RO/Machine Log Complete    Machine Temp    36-37*C             Dialysate: pH  7.4    Conductivity: Meter 14.0    HD Machine  14.1    TCD: 13.9  Dialyzer Lot # [id number]    Blood Tubing Lot # [id number]    Saline Lot #[id number]     CHLORINE TESTING-Before each treatment and every 4 hours    Total Chlorine: [x] less than 0.1 ppm  Initial Time Check: 1155     4 Hr/2nd Check Time:    (if greater than 0.1 ppm from Primary then every 30 minutes from Secondary)     TREATMENT INITIATION - with Dialysis Precautions:   [x] All Connections Secured              [x] Saline Line Double Clamped   [x] Venous Parameters Set               [x] Arterial Parameters Set    [x] Prime Given 250ml NSS              [x]Air Foam Detector Engaged          Treatment Initiation Note:  4025 Initiated HD treatment successfully, wearing required PPEs. During Treatment Notes:    1509  Face & Vascular access visible with art and vinita line connections intact. Pt tolerating dialysis. 1230  Face & Vascular access visible with art and vinita line connections intact. Pt tolerating dialysis. 1245 Face & Vascular access visible with art and vinita line connections intact. Pt tolerating dialysis. 1300 Face & Vascular access visible with art and vinita line connections intact. Pt tolerating dialysis. 1315  Face & Vascular access visible with art and vinita line connections intact. Pt tolerating dialysis. 1330 Face & Vascular access visible with art and vinita line connections intact. Pt tolerating dialysis. 1345 Face & Vascular access visible with art and vinita line connections intact. Pt tolerating dialysis. 1400 Face & Vascular access visible with art and vinita line connections intact. Pt tolerating dialysis. 1415  Face & Vascular access visible with art and vinita line connections intact. Pt tolerating dialysis. 1430  Face & Vascular access visible with art and vinita line connections intact. Pt tolerating dialysis.   1445 Face & Vascular access visible with art and vinita line connections intact. Pt tolerating dialysis. 1500 Face & Vascular access visible with art and vinita line connections intact. Pt tolerating dialysis. 1515 Face & Vascular access visible with art and vinita line connections intact. Pt tolerating dialysis. 1520  Dialysis treatment complete. Medication    Dose    Volume Route      Time       [facility] Nurse   Hectorol 4 mcgs 2 ml HD 1417 A Shannan  RN   Epogen 28955 units 1 ml HD 1422  ATony Campbell RN      HD                  Post Assessment  Dialyzer Cleared:   [x] Good  [] Fair  [] Poor  Blood processed:  0 L  UF Removed:  2000 Mm    Post BP: 150/76  Pulse: 80  Respirations: 21   Temp: 97.8  [x] oral  [] Ax  [] Esophageal   Lungs: [x] Clear                [] No change from initial assessment   Post Tx Vascular Access: [] N/A  AVF/AVG: Bleeding stopped with  Arterial Pressure for  min   Venous Pressure for  min      Cardiac:  [] Regular   [x] Irregular   Rhythm:  [x] Monitored   [] Not Monitored    CVC Catheter: [] N/A  Locking solution: Heparin 1:1000 U  Arterial port 1.3 ml   Venous port 1.3 ml   Edema:  [x] None  [] Generalized                     Skin:[x] Warm  [x] Dry [] Diaphoretic               [] Flushed  [] Pale [] Cyanotic Pain:  [x]0  []1-2  []3-4  []5-6   []7-8  []9-10           Post Treatment Note:   1520 Successfully de-accessed non-tunneled dialysis catheter anf capped with Leur blue, wearing required PPEs. POST TREATMENT PRIMARY NURSE HANDOFF REPORT:   Post Dialysis: MERISSA Beltran RN        Time:  1600         Abbreviations: AVG-arterial venous graft, AVF-arterial venous fistula, IJ-Internal Jugular, Subcl-Subclavian, Fem-Femoral, Tx-treatment, AP/HR-apical heart rate, VSS- Vital Signs Stable, CVC- Central Venous Catheter, DFR-dialysate flow rate, BFR-blood flow rate, AP-arterial pressure, -venous pressure, UF-ultrafiltrate, TMP-transmembrane pressure, Vinita-Venous, Art-Arterial, RO-Reverse Osmosis

## 2022-12-21 NOTE — PROGRESS NOTES
ACUTE HEMODIALYSIS FLOW SHEET      HEMODIALYSIS ORDERS: Physician: BERTRAM     Dialyzer: Daisy   Duration: 3   BFR: 250  DFR: 500   Dialysate:  Temp 36-37*C   K+  3.0    Ca+ 2.5   Na 140  Bicarb 35   Wt Readings from Last 1 Encounters:        Patient Chart [x]   Unable to Obtain []  Dry weight/UF Goal:2000   Heparin []  Bolus    Units    [] Hourly    Units    [x]None       Pre BP  134/74    Pulse: 80 Respirations: 17 Temp: 97.5 [] Tempoal  [x] Ax  [] Esoph   Labs: []  Pre  []  Post:   [x] N/A   Additional Orders (medications, blood products, hypotension management): [] Yes   [x] No       [x]   [facility] Consent Verified       CATHETER ACCESS:  []N/A   [x]Right   []Left   []IJ   []Fem  []Chest wall  []TransHepatic   [x] First use X-ray verified     []Tunnel    [x] Non Tunneled   [x]No S/S infection  []Redness  []Drainage []Cultured []Swelling []Pain   [x]Medical Aseptic Prep Utilized   []Dressing Changed  [] Biopatch  Date:    []Clotted   [x]Patent   Flows: [x]Good  []Poor  []Reversed   If access problem,  notified: []Yes    [x]N/A        GRAFT/FISTULA ACCESS:   [x]N/A     []Right     []Left     []UE     []LE   []AVG   []AVF       []Medical Aseptic Prep Utilized   []No S/S infection  []Redness  []Drainage [] Cultured  [] Swelling  [] Pain  Bruit:   [] Strong    [] Weak       Thrill :   [] Strong    [] Weak     Needle Gauge: 15   Length: 1 inch   If access problem,  notified: []Yes     [x]N/A                GENERAL ASSESSMENT:    LUNGS:  Resp Rate 17   [] Clear  [x] Coarse  [] Crackles  [] Wheezing  [] Diminished                                                           [] RLL   [] LLL  [] RUL   [] ARMIDA            Respirations:  [x]Easy  []Labored  []N/A  Cough:  []Productive  []Dry  []N/A               Therapy:  []RA   [] Ventilated   [] Intubated   [] Trach            O2 Device:  [] NC   [] NRB  [] Trach Mask  [x] BiPaP  Flow:   l/min                                                    CARDIAC: [] Regular      [x] Irregular   [] Rhythm:          [x] Monitored   [] Bedside   [] Remotely monitored       EDEMA: [x] None   []Generalized  [] Pitting [] 1+   [] 2 +   [] 3+    [] 4+        SKIN:   [] Hot     [] Cold    [x] Warm   [] Dry    [] Diaphoretic                 [] Flushed  [] Jaundiced  [] Cyanotic  [] Pale      LOC:    [x] Alert      [x]Oriented:    [x] Person     [x] Place   []Time               [] Confused  [] Lethargic  [] Medicated  [] Non-responsive  [] Non-Verbal     GI / ABDOMEN:                     [x] Flat    [] Distended    [] Soft    [] Firm   []  Obese                   [] Diarrhea   [] FMS [] Bowel Sounds  [] Nausea  [] Vomiting                   [] NGT  [] OGT  [] PEG  [] Tube Feedings @     mL/hr     / URINE ASSESSMENT:                   [] Voiding    [x] Oliguria  [] Anuria                     [x]  Hernandez   [] Incontinent  []  Incontinent Brief   []  PureWick     PAIN:  [x] 0 []1  []2   []3   []4   []5   []6   []7   []8   []9   []10                MOBILITY:  [] Bed    [x] Stretcher      All Vitals and Treatment Details on Attached [address]: [facility]          Room # SO64/75    [] Routine         [x] 1st Time Acute/Chronic   [] Urgent      [x] Stat              [] Acute Room   []  Bedside    [] ICU/CCU     [x] ER     Isolation Precautions:  [x] Dialysis    There are currently no Active Isolations     ALLERGIES:     No Known Allergies     Code Status:  Full Code     Hepatitis Status      No results found for: HAMAT, HAAB, HABT, HAAT, HBSAG, HBSB, HBSAT, HBABN, HBCM, HBCAB, HBCAT, XBCABS, HBEAB, HBEAG, XHEPCS, 128568, HBEGLT, HBCMLT, HBCLT, HBEBLT, [id number], [id number], HAVMLT, 832281, HBCMLT, [id number], HCGAT     Current Labs:      Lab Results   Component Value Date/Time    WBC 6.2 12/19/2022 07:08 AM    HGB 8.6 (L) 12/19/2022 07:08 AM    HCT 27.7 (L) 12/19/2022 07:08 AM    PLATELET 941 61/84/8598 07:08 AM    MCV 91.4 12/19/2022 07:08 AM     Lab Results   Component Value Date/Time Sodium 143 12/19/2022 07:08 AM    Potassium 3.7 12/19/2022 07:08 AM    Chloride 110 12/19/2022 07:08 AM    CO2 23 12/19/2022 07:08 AM    Anion gap 10 12/19/2022 07:08 AM    Glucose 204 (H) 12/19/2022 07:08 AM    BUN 95 (H) 12/19/2022 07:08 AM    Creatinine 5.04 (H) 12/19/2022 07:08 AM    BUN/Creatinine ratio 19 12/19/2022 07:08 AM    GFR est AA 17 (L) 09/16/2022 08:48 AM    GFR est non-AA 14 (L) 09/16/2022 08:48 AM    Calcium 9.3 12/20/2022 10:20 PM          DIET:  DIET NPO     PRIMARY NURSE REPORT:   Pre Dialysis: DONALD Palomo RN       EDUCATION:    [x] Patient           Knowledge Basis: []None []Minimal [] Substantial [] Unknown  Barriers to learning  [x]None  [] Intubated/Trached/Ventilated  [] Sedated/Paralyzed   [x] Access Care     [] S&S of infection  [] Fluid Management  [x] K+   [] Procedural    [] Medications   [] Tx Options   [] Transplant   [] Diet      Teaching Tools:  [x] Explain  [] Demo  [] Handouts [] Video  Patient response: [x] Verbalized understanding   [] Requires follow up        [x] Time Out/Safety Check    [x] Extracorporeal Circuit Tested for integrity       RO/HEMODIALYSIS MACHINE SAFETY CHECKS - Before each treatment:        [facility]                                    [] Unit Machine #  with centralized RO                                  [] Portable Machine #1/RO serial # [id number]                                  [] Portable Machine #2/RO serial # [id number]                                  [] Portable Machine #4/RO serial # [id number]                                  [x] Portable Machine #3/RO serial # [id number]                                                                                                       Alarm Test:  Pass time 2133         [x] RO/Machine Log Complete    Machine Temp    36-37*C             Dialysate: pH  7.4    Conductivity: Meter 14.0    HD Machine  14.4    TCD: 14.0  Dialyzer Lot # [id number]    Blood Tubing Lot # [id number]    Saline Lot # [id number] CHLORINE TESTING-Before each treatment and every 4 hours    Total Chlorine: [x] less than 0.1 ppm  Initial Time Check: 22     4 Hr/2nd Check Time: NR     (if greater than 0.1 ppm from Primary then every 30 minutes from Secondary)     TREATMENT INITIATION - with Dialysis Precautions:   [x] All Connections Secured              [x] Saline Line Double Clamped   [x] Venous Parameters Set               [x] Arterial Parameters Set    [x] Prime Given 250ml NSS              [x]Air Foam Detector Engaged          Treatment Initiation Note:  ACCESSED CATHETER USING ASEPTIC TECHNIQUE, LINE PATENT ASPIRATED AND FLUSHED WELL, TX STARTED    During Treatment Notes:    1684  Face & Vascular access visible with art and vinita line connections intact. Pt tolerating dialysis. 2245  Face & Vascular access visible with art and vinita line connections intact. Pt tolerating dialysis. 2300 Face & Vascular access visible with art and vinita line connections intact. Pt tolerating dialysis. 2315 Face & Vascular access visible with art and vinita line connections intact. Pt tolerating dialysis. 2330  Face & Vascular access visible with art and vinita line connections intact. Pt tolerating dialysis. 2345 Face & Vascular access visible with art and vinita line connections intact. Pt tolerating dialysis. 0000 Face & Vascular access visible with art and vinita line connections intact. Pt tolerating dialysis. 0015 Face & Vascular access visible with art and vinita line connections intact. Pt tolerating dialysis. 0030  Face & Vascular access visible with art and vinita line connections intact. Pt tolerating dialysis. 0045  Face & Vascular access visible with art and vinita line connections intact. Pt tolerating dialysis. 0100 Face & Vascular access visible with art and vinita line connections intact. Pt tolerating dialysis. 0115 Face & Vascular access visible with art and vinita line connections intact. Pt tolerating dialysis.   0120 Face & Vascular access visible with art and vinita line connections intact. Pt tolerating dialysis. 0120  Dialysis treatment complete. Medication    Dose    Volume Route      Time       [facility] Nurse    heparin  1000u  HD        HD         HD                  Post Assessment  Dialyzer Cleared:   [x] Good  [] Fair  [] Poor  Blood processed:  41.5 L  UF Removed:  2000 Ml    Post BP: 137/8/5  Pulse: 79  Respirations: 14   Temp: 97.8  [] Temporal  [x] Ax  [] Esophageal   Lungs: [] Clear                [x] No change from initial assessment   Post Tx Vascular Access: [x] N/A  AVF/AVG: Bleeding stopped with  Arterial Pressure for  min   Venous Pressure for  min      Cardiac:  [] Regular   [x] Irregular   Rhythm:  [x] Monitored   [] Not Monitored    CVC Catheter: [] N/A  Locking solution: Heparin 1:1000 U  Arterial port 1.3 ml   Venous port 1.3 ml   Edema:  [x] None  [] Generalized                     Skin:[x] Warm  [] Dry [] Diaphoretic               [] Flushed  [] Pale [] Cyanotic Pain:  [x]0  []1-2  []3-4  []5-6   []7-8  []9-10           Post Treatment Note:   Blood rinsed back, lines flushed with normal saline clamped and capped, report to unit nurse. POST TREATMENT PRIMARY NURSE HANDOFF REPORT:   Post Dialysis: DONALD Palomo RN                 Abbreviations: AVG-arterial venous graft, AVF-arterial venous fistula, IJ-Internal Jugular, Subcl-Subclavian, Fem-Femoral, Tx-treatment, AP/HR-apical heart rate, VSS- Vital Signs Stable, CVC- Central Venous Catheter, DFR-dialysate flow rate, BFR-blood flow rate, AP-arterial pressure, -venous pressure, UF-ultrafiltrate, TMP-transmembrane pressure, Vinita-Venous, Art-Arterial, RO-Reverse Osmosis

## 2022-12-21 NOTE — PROGRESS NOTES
IUF today for 2-2.5 lit   Orders updated   Tolerated HD yesterday   Will discuss their interest in PD on rounds      Please call with questions,    Guero Abdi MD FASN  Cell [phone]  Pager: [phone]

## 2022-12-21 NOTE — PROGRESS NOTES
Admit Date: 2022    POD * No surgery found *    Procedure:  * No surgery found *    Subjective:     Patient remains in the ER, presently receiving dialysis via his temporary IJ catheter. No issues with his dialysis runs at this time. Spoke with Dr. Juancarlos Robison from nephrology, requesting a [facility] catheter placement as soon as possible. Patient is awake alert and answers all questions appropriately. Moves all extremities to command. Patient states he is feeling well and tolerating dialysis thus far. Denies any chest pain or shortness of breath. Denies any dyspnea on exertion. Objective:     Blood pressure (!) 146/81, pulse 88, temperature 97.9 °F (36.6 °C), resp. rate 16, height 5' 4\" (1.626 m), weight 135 lb (61.2 kg), SpO2 96 %. Temp (24hrs), Av °F (36.7 °C), Min:97.5 °F (36.4 °C), Max:98.7 °F (37.1 °C)      Physical Exam:    General:          In NAD. Nontoxic-appearing. Answers all questions appropriate. Moves all extremities to command. HEENT:           Head NCAT. Pupils equal round sclerae anicteric, EOMI. OP clear. Neck:               Supple, trachea midline. No carotid bruits or thrills appreciated bilaterally. Right temporary IJ catheter noted, clean dry and intact. Lungs:             Clear, no wheezes. Effort nonlabored, no accessory muscle use. Chest wall:      No chest wall tenderness. Chest expansion equal and symmetrical bilaterally. Heart:              RRR. No JVD. Abdomen:        Soft, NT/ND. Bowel sounds normoactive. Extremities:     Warm, no pitting edema. No evidence for ischemia. Skin:                Not pale. Not Jaundiced  No rashes   Psych:             Mood normal.  Calm, no agitation.   Neurologic:      Awake and alert, moves extremities spontaneously  Labs:   Recent Results (from the past 24 hour(s))   ECHO ADULT COMPLETE    Collection Time: 22  4:55 PM   Result Value Ref Range    IVSd 1.3 (A) 0.6 - 1.0 cm    LVIDd 4.6 4.2 - 5.9 cm    LVIDs 4.6 cm    LVOT Diameter 2.2 cm    LVPWd 1.3 (A) 0.6 - 1.0 cm    LVOT Cardiac Output 4.7 liter/minute    LVOT Cardiac Output 4.7 liter/minute    LVOT Cardiac Output 4.2 liter/minute    LVOT Cardiac Output 4.2 liter/minute    LVOT Cardiac Output 4.5 liter/minute    LVOT Cardiac Output 4.5 liter/minute    LV Ejection Fraction A2C 38 %    LV Ejection Fraction A4C 32 %    EF BP 37 (A) 55 - 100 %    LV Ejection Fraction A2C 37 %    LV Ejection Fraction A4C 33 %    LV EDV A2C 167 mL    LV EDV A4C 179 mL    LV EDV  (A) 67 - 155 mL    LV ESV A2C 105 mL    LV ESV A4C 121 mL    LV ESV  (A) 22 - 58 mL    LVOT Peak Gradient 3 mmHg    LVOT Mean Gradient 1 mmHg    LVOT SV 72.9 ml    LVOT Peak Velocity 0.8 m/s    LVOT VTI 19.2 cm    RV Free Wall Peak S' 13 cm/s    LA Volume A/L 142 mL    LA Volume 2C 139 (A) 18 - 58 mL    LA Volume 4C 117 (A) 18 - 58 mL    AV Area by Planimetry 1.2 cm2    AV Area by Planimetry 1.2 cm2    AV Area by Peak Velocity 0.6 cm2    AV Area by VTI 0.7 cm2    AR .7 millisecond    AR Max Velocity PISA 3.6 m/s    AV Peak Gradient 92 mmHg    AV Mean Gradient 51 mmHg    AV Peak Velocity 4.8 m/s    AV Mean Velocity 3.4 m/s    AV .1 cm    MV Nyquist Velocity 31 cm/s    MV Regurg Velocity PISA 6.3 m/s    MR .5 cm    TAPSE 2.0 1.7 cm    TR Peak Gradient 72 mmHg    TR Max Velocity 4.23 m/s    Ascending Aorta 3.3 cm    Aortic Root 3.8 cm    Fractional Shortening 2D 0 28 - 44 %    LV ESV Index BP 68 mL/m2    LV EDV Index  mL/m2    LV ESV Index A4C 73 mL/m2    LV EDV Index A4C 108 mL/m2    LV ESV Index A2C 63 mL/m2    LV EDV Index A2C 101 mL/m2    LVIDd Index 2.77 cm/m2    LVIDs Index 2.77 cm/m2    LV RWT Ratio 0.57     LV Mass 2D 230.2 (A) 88 - 224 g    LV Mass 2D Index 138.6 (A) 49 - 115 g/m2    LA Volume Index A/L 86 16 - 34 mL/m2    LVOT Stroke Volume Index 43.9 mL/m2    LVOT Area 3.8 cm2    LA Volume Index 2C 84 (A) 16 - 34 mL/m2    LA Volume Index 4C 70 (A) 16 - 34 mL/m2    Ao Root Index 2.29 cm/m2    Ascending Aorta Index 1.99 cm/m2    AV Velocity Ratio 0.17     LVOT:AV VTI Index 0.17     SULAIMAN/BSA VTI 0.4 cm2/m2    SULAIMAN/BSA Peak Velocity 0.4 cm2/m2    RVSP 87 mmHg    Est. RA Pressure 15 mmHg    RA Area 4C 29.5 cm2    RV Basal Dimension 4.7 cm   PHOSPHORUS    Collection Time: 12/20/22 10:20 PM   Result Value Ref Range    Phosphorus 4.4 2.5 - 4.9 MG/DL   PTH INTACT    Collection Time: 12/20/22 10:20 PM   Result Value Ref Range    Calcium 9.3 8.5 - 10.1 MG/DL    PTH, Intact 191.4 (H) 18.4 - 88.0 pg/mL   HEP B SURFACE AB    Collection Time: 12/20/22 10:20 PM   Result Value Ref Range    Hepatitis B surface Ab 9.78 (L) >10.0 mIU/mL    Hep B surface Ab Interp. Negative (A) POS      Hep B surface Ab comment        Samples with a  value of 10 mIU/mL or greater are considered positive (protective immunity) in accordance with the CDC guidelines. HEP B SURFACE AG    Collection Time: 12/20/22 10:20 PM   Result Value Ref Range    Hepatitis B surface Ag <0.10 <1.00 Index    Hep B surface Ag Interp. Negative NEG     BLOOD GAS, ARTERIAL POC    Collection Time: 12/21/22  4:44 AM   Result Value Ref Range    Device: NASAL CANNULA      FIO2 (POC) 44 %    pH (POC) 7.54 (H) 7.35 - 7.45      pCO2 (POC) 28.4 (L) 35.0 - 45.0 MMHG    pO2 (POC) 44 (LL) 80 - 100 MMHG    HCO3 (POC) 24.6 22 - 26 MMOL/L    sO2 (POC) 86.2 (L) 92 - 97 %    Base excess (POC) 2.7 mmol/L    Allens test (POC) NOT APPLICABLE      Total resp.  rate 23      Site RIGHT BRACHIAL      Specimen type (POC) ARTERIAL      Performed by Patrice Winchester    METABOLIC PANEL, BASIC    Collection Time: 12/21/22  5:48 AM   Result Value Ref Range    Sodium 146 (H) 136 - 145 mmol/L    Potassium 3.5 3.5 - 5.5 mmol/L    Chloride 112 (H) 100 - 111 mmol/L    CO2 25 21 - 32 mmol/L    Anion gap 9 3.0 - 18 mmol/L    Glucose 99 74 - 99 mg/dL    BUN 48 (H) 7.0 - 18 MG/DL    Creatinine 2.90 (H) 0.6 - 1.3 MG/DL    BUN/Creatinine ratio 17 12 - 20      eGFR 21 (L) >60 ml/min/1.73m2 Calcium 9.3 8.5 - 10.1 MG/DL   CBC WITH AUTOMATED DIFF    Collection Time: 12/21/22  5:48 AM   Result Value Ref Range    WBC 7.3 4.6 - 13.2 K/uL    RBC 2.94 (L) 4.35 - 5.65 M/uL    HGB 8.4 (L) 13.0 - 16.0 g/dL    HCT 26.9 (L) 36.0 - 48.0 %    MCV 91.5 78.0 - 100.0 FL    MCH 28.6 24.0 - 34.0 PG    MCHC 31.2 31.0 - 37.0 g/dL    RDW 19.8 (H) 11.6 - 14.5 %    PLATELET 555 429 - 615 K/uL    MPV 10.9 9.2 - 11.8 FL    NRBC 0.0 0  WBC    ABSOLUTE NRBC 0.00 0.00 - 0.01 K/uL    NEUTROPHILS 82 (H) 40 - 73 %    LYMPHOCYTES 10 (L) 21 - 52 %    MONOCYTES 7 3 - 10 %    EOSINOPHILS 0 0 - 5 %    BASOPHILS 0 0 - 2 %    IMMATURE GRANULOCYTES 0 0.0 - 0.5 %    ABS. NEUTROPHILS 6.0 1.8 - 8.0 K/UL    ABS. LYMPHOCYTES 0.7 (L) 0.9 - 3.6 K/UL    ABS. MONOCYTES 0.5 0.05 - 1.2 K/UL    ABS. EOSINOPHILS 0.0 0.0 - 0.4 K/UL    ABS. BASOPHILS 0.0 0.0 - 0.1 K/UL    ABS. IMM. GRANS. 0.0 0.00 - 0.04 K/UL    DF AUTOMATED     IRON PROFILE    Collection Time: 12/21/22  5:48 AM   Result Value Ref Range    Iron 17 (L) 50 - 175 ug/dL    TIBC 217 (L) 250 - 450 ug/dL    Iron % saturation 8 (L) 20 - 50 %       Data Review reviewed  Consultants documentation, Nursing documentation, and I & O    Assessment:     Active Problems:    Acute respiratory failure with hypoxia ([facility] [address] (12/19/2022)      Acute decompensated heart failure ([facility] [address] (12/19/2022)      Acute kidney injury superimposed on chronic kidney disease ([facility] [address] (12/19/2022)        Plan/Recommendations/Medical Decision Making:     Continue present treatment -maintain maximum medical management  Dialysis per nephrology  Patient instructed he will be on-call to IR on 12/22 2022 for insertion of TDC. Risk benefits and logistics all detailed  Patient and wife who is present at bedside, are willing to proceed as planned. State they understand all of the above and are appreciative of our services.   On-call to IR 12/22/2022 10 AM or to follow  N.p.o. after midnight except for sips of water with meds  I will discuss details with my attending

## 2022-12-21 NOTE — PROGRESS NOTES
INTERVENTION:  HEMODYNAMIC STABILIZATION  MAINTAIN BP WNL WHILE ON HD. INTERVENTION:  FLUID MANAGEMENT  WILL ATTEMPT 2000 ML TOTAL FLUID REMOVAL AS TOLERATED. INTERVENTION:  METABOLIC/ELECTROLYTE MANAGEMENT  3.0 POTASSIUM 2.5 CALCIUM DIALYSATE USED WITH HD TODAY. INTERVENTION:  HEMODIALYSIS ACCESS SITE MANAGEMENT  RIGHT NON TUNNELED CATHETER USING ASEPTIC TECHNIQUE. GOAL:  SIGNS AND SYMPTOMS OF LISTED POTENTIAL PROBLEMS WILL BE ABSENT OR MANAGEABLE. OUTCOME:  PROGRESSING. HD PLANNED FOR 3 HOURS TODAY.

## 2022-12-21 NOTE — PROGRESS NOTES
Report given to Nehal Keller at [phone] to room [phone]. Pt is currently in dialysis in the room.

## 2022-12-22 LAB
ALBUMIN SERPL-MCNC: 2.5 G/DL (ref 3.4–5)
ANION GAP SERPL CALC-SCNC: 10 MMOL/L (ref 3–18)
BACTERIA SPEC CULT: NORMAL
BUN SERPL-MCNC: 72 MG/DL (ref 7–18)
BUN/CREAT SERPL: 17 (ref 12–20)
CALCIUM SERPL-MCNC: 9.3 MG/DL (ref 8.5–10.1)
CHLORIDE SERPL-SCNC: 108 MMOL/L (ref 100–111)
CO2 SERPL-SCNC: 25 MMOL/L (ref 21–32)
CREAT SERPL-MCNC: 4.36 MG/DL (ref 0.6–1.3)
GLUCOSE BLD STRIP.AUTO-MCNC: 133 MG/DL (ref 70–110)
GLUCOSE SERPL-MCNC: 133 MG/DL (ref 74–99)
GRAM STN SPEC: NORMAL
PHOSPHATE SERPL-MCNC: 3.4 MG/DL (ref 2.5–4.9)
POTASSIUM SERPL-SCNC: 3.4 MMOL/L (ref 3.5–5.5)
SERVICE CMNT-IMP: NORMAL
SODIUM SERPL-SCNC: 143 MMOL/L (ref 136–145)

## 2022-12-22 PROCEDURE — 82962 GLUCOSE BLOOD TEST: CPT

## 2022-12-22 PROCEDURE — C1750 CATH, HEMODIALYSIS,LONG-TERM: HCPCS | Performed by: SURGERY

## 2022-12-22 PROCEDURE — 77030002933 HC SUT MCRYL J&J -A: Performed by: SURGERY

## 2022-12-22 PROCEDURE — 74011000250 HC RX REV CODE- 250: Performed by: EMERGENCY MEDICINE

## 2022-12-22 PROCEDURE — 36580 REPLACE CVAD CATH: CPT | Performed by: SURGERY

## 2022-12-22 PROCEDURE — 99152 MOD SED SAME PHYS/QHP 5/>YRS: CPT | Performed by: SURGERY

## 2022-12-22 PROCEDURE — 80069 RENAL FUNCTION PANEL: CPT

## 2022-12-22 PROCEDURE — 74011250637 HC RX REV CODE- 250/637: Performed by: FAMILY MEDICINE

## 2022-12-22 PROCEDURE — 02HV33Z INSERTION OF INFUSION DEVICE INTO SUPERIOR VENA CAVA, PERCUTANEOUS APPROACH: ICD-10-PCS | Performed by: SURGERY

## 2022-12-22 PROCEDURE — 74011250636 HC RX REV CODE- 250/636: Performed by: INTERNAL MEDICINE

## 2022-12-22 PROCEDURE — 99232 SBSQ HOSP IP/OBS MODERATE 35: CPT | Performed by: FAMILY MEDICINE

## 2022-12-22 PROCEDURE — 77030039266 HC ADH SKN EXOFIN S2SG -A: Performed by: SURGERY

## 2022-12-22 PROCEDURE — 36561 INSERT TUNNELED CV CATH: CPT | Performed by: SURGERY

## 2022-12-22 PROCEDURE — 77030021887 HC CATH ANGI DIAG PROFLO MEDT -B: Performed by: SURGERY

## 2022-12-22 PROCEDURE — 74011250636 HC RX REV CODE- 250/636: Performed by: SURGERY

## 2022-12-22 PROCEDURE — 0JH63XZ INSERTION OF TUNNELED VASCULAR ACCESS DEVICE INTO CHEST SUBCUTANEOUS TISSUE AND FASCIA, PERCUTANEOUS APPROACH: ICD-10-PCS | Performed by: SURGERY

## 2022-12-22 PROCEDURE — 74011000250 HC RX REV CODE- 250: Performed by: SURGERY

## 2022-12-22 PROCEDURE — 65660000004 HC RM CVT STEPDOWN

## 2022-12-22 PROCEDURE — 99223 1ST HOSP IP/OBS HIGH 75: CPT | Performed by: INTERNAL MEDICINE

## 2022-12-22 PROCEDURE — 74011000250 HC RX REV CODE- 250: Performed by: FAMILY MEDICINE

## 2022-12-22 PROCEDURE — 36415 COLL VENOUS BLD VENIPUNCTURE: CPT

## 2022-12-22 RX ORDER — FENTANYL CITRATE 50 UG/ML
INJECTION, SOLUTION INTRAMUSCULAR; INTRAVENOUS AS NEEDED
Status: DISCONTINUED | OUTPATIENT
Start: 2022-12-22 | End: 2022-12-22 | Stop reason: HOSPADM

## 2022-12-22 RX ORDER — MIDAZOLAM HYDROCHLORIDE 1 MG/ML
INJECTION, SOLUTION INTRAMUSCULAR; INTRAVENOUS AS NEEDED
Status: DISCONTINUED | OUTPATIENT
Start: 2022-12-22 | End: 2022-12-22 | Stop reason: HOSPADM

## 2022-12-22 RX ORDER — LIDOCAINE HYDROCHLORIDE 10 MG/ML
INJECTION, SOLUTION EPIDURAL; INFILTRATION; INTRACAUDAL; PERINEURAL AS NEEDED
Status: DISCONTINUED | OUTPATIENT
Start: 2022-12-22 | End: 2022-12-22 | Stop reason: HOSPADM

## 2022-12-22 RX ORDER — HEPARIN SODIUM 200 [USP'U]/100ML
INJECTION, SOLUTION INTRAVENOUS
Status: COMPLETED | OUTPATIENT
Start: 2022-12-22 | End: 2022-12-22

## 2022-12-22 RX ADMIN — BUMETANIDE 1 MG: 0.25 INJECTION INTRAMUSCULAR; INTRAVENOUS at 17:24

## 2022-12-22 RX ADMIN — CARVEDILOL 25 MG: 25 TABLET, FILM COATED ORAL at 12:13

## 2022-12-22 RX ADMIN — CLOPIDOGREL BISULFATE 75 MG: 75 TABLET ORAL at 12:13

## 2022-12-22 RX ADMIN — ATORVASTATIN CALCIUM 40 MG: 40 TABLET, FILM COATED ORAL at 12:13

## 2022-12-22 RX ADMIN — SODIUM CHLORIDE, PRESERVATIVE FREE 10 ML: 5 INJECTION INTRAVENOUS at 06:28

## 2022-12-22 RX ADMIN — IRON SUCROSE 200 MG: 20 INJECTION, SOLUTION INTRAVENOUS at 17:24

## 2022-12-22 RX ADMIN — CARVEDILOL 25 MG: 25 TABLET, FILM COATED ORAL at 17:24

## 2022-12-22 RX ADMIN — SODIUM CHLORIDE, PRESERVATIVE FREE 10 ML: 5 INJECTION INTRAVENOUS at 21:28

## 2022-12-22 RX ADMIN — ALLOPURINOL 100 MG: 100 TABLET ORAL at 12:13

## 2022-12-22 RX ADMIN — BUMETANIDE 1 MG: 0.25 INJECTION INTRAMUSCULAR; INTRAVENOUS at 12:13

## 2022-12-22 RX ADMIN — SODIUM CHLORIDE, PRESERVATIVE FREE 10 ML: 5 INJECTION INTRAVENOUS at 21:29

## 2022-12-22 NOTE — PROGRESS NOTES
1900: Bedside and Verbal shift change report given to ZAN Willoughby (oncoming nurse) by Clay Urias RN (offgoing nurse). Report included the following information SBAR, Kardex, MAR, and Cardiac Rhythm NSR .     2030: Pt ambulated to restroom on non-rebreather mask and had large bowel movement. Ambulated back to bed. Tolerated fairly well. Placed back on hi-volodymyr NC.    0520: Spoke with hospitalist about patient's expressed discomfort and pain at kan site when \"he tries to [employer]" he states that he can feel it and it hurts. New orders to remove akn catheter. Voiding trial.    0700: Bedside and Verbal shift change report given to Anahi Martinez RN (oncoming nurse) by Ana M Mahajan RN (offgoing nurse). Report included the following information SBAR, Kardex, [place], and Cardiac Rhythm NSR .     0705: Cath holding called to get report on patient and to inform that they entered transport for patient to come get [facility] placed. 8566: Patient left floor to go to cath holding. Lauren informed that patient is on hi-volodymyr O2. Pt transported on non-rebreather.

## 2022-12-22 NOTE — PROGRESS NOTES
Problem: Chronic Renal Failure  Goal: *Fluid and electrolytes stabilized  Outcome: Progressing Towards Goal     Problem: Patient Education: Go to Patient Education Activity  Goal: Patient/Family Education  Outcome: Progressing Towards Goal     Problem: Falls - Risk of  Goal: *Absence of Falls  Description: Document D[place]ie [place] Fall Risk and appropriate interventions in the flowsheet.   Outcome: Progressing Towards Goal  Note: Fall Risk Interventions:  Mobility Interventions: Bed/chair exit alarm, Communicate number of staff needed for ambulation/transfer, Patient to call before getting OOB, Utilize walker, cane, or other assistive device         Medication Interventions: Assess postural VS orthostatic hypotension, Bed/chair exit alarm, Evaluate medications/consider consulting pharmacy, Patient to call before getting OOB, Teach patient to arise slowly    Elimination Interventions: Bed/chair exit alarm, Call light in reach, Patient to call for help with toileting needs, Toileting schedule/hourly rounds              Problem: Patient Education: Go to Patient Education Activity  Goal: Patient/Family Education  Outcome: Progressing Towards Goal

## 2022-12-22 NOTE — ROUTINE PROCESS
TRANSFER - IN REPORT:    Verbal report received from Kaya Boudreaux. RN on Franz Cranker  being received from ED for routine progression of care      Report consisted of patients Situation, Background, Assessment and   Recommendations(SBAR). Information from the following report(s) SBAR, Kardex, ED Summary, MAR, Cardiac Rhythm SR, and Quality Measures was reviewed with the receiving nurse. Opportunity for questions and clarification was provided. Assessment completed upon patients arrival to unit and care assumed. SBAR report given by [id number] Odell. RN offgoing nurse to Ang Palafox., RN oncoming  nurse.

## 2022-12-22 NOTE — PROGRESS NOTES
0715 Bedside shift change report given to [employer] (oncoming nurse) by Resa Primrose RN (offgoing nurse). Report included the following information SBAR, Kardex, ED Summary, Procedure Summary, Intake/Output, MAR, Recent Results, Med Rec Status, and Cardiac Rhythm NSR .      Wound Prevention Checklist    Patient: Wagner Cruz (26 y.o. male)  Date: 12/22/2022  Diagnosis: Acute respiratory failure with hypoxia ([facility] [address] [J96.01]  Acute kidney injury superimposed on chronic kidney disease ([address] [N17.9, N18.9]  Acute decompensated heart failure ([facility] [address] [I50.9] <principal problem not specified>    Precautions:         []  Heel prevention boots placed on patient    [x]  Patient turned q2h during shift    []  Lift team ordered      Patient on [place] bed/Specialty bed  [x]  [x]  Each Wound is documented during shift (Stage, Color, drainage, odor, measurements, and dressings)    [x]  Dual skin check done with Case Causey RN

## 2022-12-22 NOTE — PROGRESS NOTES
12/22/22 1100   Oxygen Therapy   O2 Sat (%) 93 %   Pulse via Oximetry 66 beats per minute   O2 Device Heated; Hi flow nasal cannula   O2 Flow Rate (L/min) 35 l/min   O2 Temperature 91.4 °F (33 °C)   FIO2 (%) 70 %

## 2022-12-22 NOTE — PROGRESS NOTES
[facility]  Progress Note    Patient: Shelley Bianchi Age: 80 y.o. : 1940 MR#: [id number] SSN: [id number]  Date: 2022     Subjective/24-hour events:     HD catheter placed this morning. Respiratory status improved, denies shortness of breath. 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 Will assess need for home oxygen postdialysis either today or first thing in a.m. tomorrow. Do not anticipate the patient will need oxygen as he has improved significantly following volume removal.  Supportive care to continue as ordered, home tomorrow if stable. Case discussed with:  [x]Patient  []Family  [x]Nursing  [x]Case Management  DVT Prophylaxis:  []Lovenox  []Hep SQ  [x]SCDs  []Coumadin   []On Heparin gtt    Objective:   VS: Visit Vitals  BP (!) 165/85   Pulse 81   Temp 98.5 °F (36.9 °C)   Resp 18   Ht 5' 4\" (1.626 m)   Wt 57.7 kg (127 lb 3.3 oz)   SpO2 97%   BMI 21.83 kg/m²       General:  In NAD. Nontoxic-appearing. Cardiovascular:  RRR. Pulmonary:  Lungs clear bilaterally, no wheezes. No accessory muscle use. GI:  Abdomen soft, NTTP. Extremities:  Warm, no edema or ischemia. Neuro:  Awake and alert. Moves extremities spontaneously, motor grossly nonfocal.    Labs:    No new labs.       Signed By: Brando Allen MD     2022

## 2022-12-22 NOTE — PROCEDURES
\"Tunneled Dialysis Catheter Insertion\" Procedure Note     Indications: The patient is in MADHU and is in need of long term dialysis access. A right tunneled dialysis catheter is planned. Surgeon: Esequiel Mendenhall MD     Assistants: None     Anesthesia: Monitored Local Anesthesia with Light Sedation administered under my direction. MEDS: Versed 2mg, Fentanyl 50mcg     ASA Class: 3     Procedure Details   Obtaining informed consent the patient was brought into the catheter lab and placed supine on the angiography table. Light sedation was given and his right neck prepped and draped in the usual sterile fashion. Timeout was performed confirming the identity of the patient, his comorbidities, Local anesthetic was administered at the temporary catheter insertion site and on the right chest and tunnel tract. A [employer] wire was inserted through the temp cath and the inferior cava was selected with the wire. Skin incision at the cath entry site was dilated with a hemostat. Local anesthetic was infused over the chest and neck and Incision was then made laterally at the anterior chest with a scalpel. This was followed by scissors to bluntly dissect the subcutaneous tunnel. The catheter was tunneled from the lateral incision subcutaneously to the neck incision and pulled through. Serial dilation over the wire was performed followed by the insertion of a peel-away sheath the SVC over the J-wire. The catheter was then inserted into the peel-away sheath and the sheath peeled out. Tip position was confirmed under fluoroscopy to be at the SVC-atrial junction and all kinks were reduced. the ports were bled out and flushed. It was then secured with 4-0 Monocryl in the neck with 2-0 Nylon to secure the catheter to the chest.. A sterile dressing was then applied. Dermabond was applied to the neck incision site and allowed to dry. The patient tolerated the procedure well and was transferred to the recovery in stable condition. Findings:  Patent right internal jugular vein and functional access. Estimated Blood Loss:  Minimal           Drains: None     Implants: 19 cm Palindrome tunnel dialysis catheter placed. Complications:  None           Disposition: To recovery- hemodynamically stable.            Condition: stable     Dougie Davis MD, FACS

## 2022-12-22 NOTE — CONSULTS
Cardiology Initial Patient Referral Note    Cardiology referral request from Dr. Darian Hess for evaluation and management/treatment of HFrEF    Date of  Admission: 12/19/2022  7:13 AM   Primary Care Physician:  Imelda Caban MD    Attending Cardiologist: Dr. Jaiden Cantu:     -Acute hypoxic respiratory failure, on HFNC  -Acute renal failure, started on HD this admission  -HFrEF, Echo 12/20/2022 with LVEF 40%, mild concentric LVH, moderately dilated RV, severely dilated LA, dilated RA. -Severe aortic stenosis, mean gradient 51 mmHg, peak gradient 92 mmHg, peak velocity 4.8 m/s by Echo 12/20/2022  -Severe pulmonary hypertension with est. TVSP 87 mmHg by Echo 12/20/2022  -HTN  -Hypercholesterolemia    Primary cardiologist at [address]:     Addendum: Independently seen and evaluated. Agree with below. Patient is admitted with dyspnea. Would continue to diurese as tolerated. Agree with weaning high flow nasal cannula as tolerated. He does have mild LV dysfunction but significant aortic stenosis. He will need further evaluation including potential consideration for aortic valve replacement or TAVR. He will also likely need consideration for coronary anatomy evaluation. Once his breathing improves, will discuss with the patient further about possible heart catheterization, coronary angiography, and consideration for aortic valve intervention.    -Volume management per nephrology team, appreciate assistance.  -Wean from HFNC as tolerated. -Continued on Coreg. Add ARB as tolerated by BP.  -Continue supportive care per primary team.  -Further recommendations based hospital course. History of Present Illness: This is a 80 y.o. male admitted for Acute respiratory failure with hypoxia ([facility] [address] [J96.01]  Acute kidney injury superimposed on chronic kidney disease ([facility] [address] [N17.9, N18.9]  Acute decompensated heart failure ([facility] [address] [I50.9].     Patient complains of: shortness of breath      Arie Simon Sarah Luciano is a 80 y.o. male who presented to the hospital due to shortness of breath/PND that occurred the night of presentation. He reports that he initially went to sleep on the couch but was still having shortness of breath and thus presented to the ER for evaluation. Pt states that he otherwise had been experiencing productive cough with brown sputum over the prior 2-3 weeks. He denies fever or chest pain. Cardiac risk factors: dyslipidemia, male gender, hypertension      Review of Symptoms:  Except as stated above include:  Constitutional:  negative  Respiratory:  As per HPI  Cardiovascular:  As per HPI  Gastrointestinal: negative  Genitourinary:  negative  Musculoskeletal:  Negative  Neurological:  Negative  Dermatological:  Negative  Endocrinological: Negative  Psychological:  Negative       Past Medical History:     Past Medical History:   Diagnosis Date    Arthritis     Chronic constipation     Gout     High cholesterol     Hypertension          Social History:     Social History     Socioeconomic History    Marital status:    Tobacco Use    Smoking status: Former    Smokeless tobacco: Former     Quit date: 3/11/1995   Substance and Sexual Activity    Alcohol use: No    Drug use: No        Family History:   History reviewed. No pertinent family history.      Medications:   No Known Allergies     Current Facility-Administered Medications   Medication Dose Route Frequency    iron sucrose (VENOFER) injection 200 mg  200 mg IntraVENous DAILY    epoetin leon-epbx (RETACRIT) injection 10,000 Units  10,000 Units SubCUTAneous Q MON, WED & FRI    doxercalciferoL (HECTOROL) 4 mcg/2 mL injection 4 mcg  4 mcg IntraVENous DIALYSIS MON, WED & FRI    sodium chloride (NS) flush 5-40 mL  5-40 mL IntraVENous Q8H    sodium chloride (NS) flush 5-40 mL  5-40 mL IntraVENous PRN    acetaminophen (TYLENOL) tablet 650 mg  650 mg Oral Q6H PRN    Or    acetaminophen (TYLENOL) suppository 650 mg  650 mg Rectal Q6H PRN polyethylene glycol (MIRALAX) packet 17 g  17 g Oral DAILY PRN    ondansetron (ZOFRAN ODT) tablet 4 mg  4 mg Oral Q8H PRN    Or    ondansetron (ZOFRAN) injection 4 mg  4 mg IntraVENous Q6H PRN    allopurinoL (ZYLOPRIM) tablet 100 mg  100 mg Oral DAILY    clopidogreL (PLAVIX) tablet 75 mg  75 mg Oral DAILY    carvediloL (COREG) tablet 25 mg  25 mg Oral BID WITH MEALS    atorvastatin (LIPITOR) tablet 40 mg  40 mg Oral DAILY    bumetanide (BUMEX) injection 1 mg  1 mg IntraVENous BID    heparin (porcine) 1,000 unit/mL injection 1,000 Units  1,000 Units Hemodialysis DIALYSIS PRN    sodium chloride (NS) flush 5-40 mL  5-40 mL IntraVENous Q8H    sodium chloride (NS) flush 5-40 mL  5-40 mL IntraVENous PRN         Physical Exam:   Visit Vitals  /75   Pulse 74   Temp 98.1 °F (36.7 °C) (Temporal)   Resp 18   Ht 5' 4\" (1.626 m)   Wt 57.7 kg (127 lb 3.3 oz)   SpO2 97%   BMI 21.83 kg/m²       BP Readings from Last 3 Encounters:   12/22/22 137/75   10/22/22 (!) 142/83   08/11/22 129/74     Pulse Readings from Last 3 Encounters:   12/22/22 74   10/22/22 64   08/11/22 93     Wt Readings from Last 3 Encounters:   12/21/22 57.7 kg (127 lb 3.3 oz)   10/22/22 61.7 kg (136 lb)   08/11/22 62.1 kg (137 lb)       General:  alert, cooperative, no distress, appears stated age  Neck:  supple  Lungs:  clear to auscultation bilaterally  Heart:  regular rate and rhythm  Abdomen:  abdomen is soft without significant tenderness, masses, organomegaly or guarding  Extremities:  atraumatic, no edema  Skin: Warm and dry.    Neuro: alert, oriented x3, affect appropriate, no focal neurological deficits, moves all extremities well, no involuntary movements  Psych: non focal     Data Review:     Recent Labs     12/21/22  0548   WBC 7.3   HGB 8.4*   HCT 26.9*        Recent Labs     12/22/22  1058 12/21/22  0548 12/20/22  2220    146*  --    K 3.4* 3.5  --     112*  --    CO2 25 25  --    * 99  --    BUN 72* 48*  --    CREA 4.36* 2.90*  --    CA 9.3 9.3 9.3   PHOS 3.4  --  4.4   ALB 2.5*  --   --        Results for orders placed or performed during the hospital encounter of 12/19/22   EKG, 12 LEAD, INITIAL   Result Value Ref Range    Ventricular Rate 92 BPM    Atrial Rate 92 BPM    P-R Interval 142 ms    QRS Duration 86 ms    Q-T Interval 386 ms    QTC Calculation (Bezet) 477 ms    Calculated P Axis 24 degrees    Calculated R Axis 25 degrees    Calculated T Axis -83 degrees    Diagnosis       Sinus rhythm with frequent premature atrial and ventricular beats  Marked ST abnormality, possible inferior subendocardial injury  Abnormal ECG  Confirmed by Fannie Kwon MD, Mihir Olvera ([id number]) on 12/19/2022 9:36:02 AM         All Cardiac Markers in the last 24 hours:  No results found for: CPK, CK, CKMMB, CKMB, RCK3, CKMBT, CKNDX, CKND1, JESSA, TROPT, TROIQ, ISAI, TROPT, TNIPOC, BNP, BNPP    Last Lipid:    Lab Results   Component Value Date/Time    Cholesterol, total 206 (H) 05/19/2010 09:29 AM    HDL Cholesterol 49 05/19/2010 09:29 AM    LDL, calculated 147.4 (H) 05/19/2010 09:29 AM    Triglyceride 48 05/19/2010 09:29 AM    CHOL/HDL Ratio 4.2 05/19/2010 09:29 AM       Cardiographics:     EKG Results       Procedure 720 Value Units Date/Time    EKG, 12 LEAD, INITIAL [[id number]] Collected: 12/19/22 0659    Order Status: Completed Updated: 12/19/22 0936     Ventricular Rate 92 BPM      Atrial Rate 92 BPM      P-R Interval 142 ms      QRS Duration 86 ms      Q-T Interval 386 ms      QTC Calculation (Bezet) 477 ms      Calculated P Axis 24 degrees      Calculated R Axis 25 degrees      Calculated T Axis -83 degrees      Diagnosis --     Sinus rhythm with frequent premature atrial and ventricular beats  Marked ST abnormality, possible inferior subendocardial injury  Abnormal ECG  Confirmed by Fannie Kwon MD, Mihir Olvera ([id number]) on 12/19/2022 9:36:02 AM      EKG, 12 LEAD, INITIAL [[id number]]     Order Status: Sent               Signed By: Cole Whitten PA-C     December 22, 2022

## 2022-12-22 NOTE — ROUTINE PROCESS
Wound Prevention Checklist    Patient: Ezequiel Romo (66 y.o. male)  Date: 12/21/2022  Diagnosis: Acute respiratory failure with hypoxia ([facility] [address] [J96.01]  Acute kidney injury superimposed on chronic kidney disease ([facility] [address] [N17.9, N18.9]  Acute decompensated heart failure ([facility] [address] [I50.9] <principal problem not specified>    Precautions:         []  Heel prevention boots placed on patient    []  Patient turned q2h during shift    []  Lift team ordered    [x]  Patient on [place] bed/Specialty bed    [x]  Each Wound is documented during shift (Stage, Color, drainage, odor, measurements, and dressings)    Patient skin is intact. Patient has no noted pressure injuries.      [x]  Dual skin check done with Jhonatan Holt., ZAN Biswas RN

## 2022-12-22 NOTE — PROGRESS NOTES
[facility]  [facility]    Patient: Jens Taylor Age: 80 y.o. : 1940 MR#: [id number] SSN: [id number]  Date: 2022     Subjective/24-hour events:     HD catheter placed this morning. Respiratory status improved, denies shortness of breath. 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 Dialysis per nephrology. Cardiology evaluation given reduced EF on echo yesterday. Discussed with Dr. Taras Freitas on unit, recommendations appreciated in advance. Mobilize as tolerated. PT/OT. Other care primarily supportive. Follow. Case discussed with:  [x]Patient  []Family  [x]Nursing  [x]Case Management  DVT Prophylaxis:  []Lovenox  []Hep SQ  [x]SCDs  []Coumadin   []On Heparin gtt    Objective:   VS: Visit Vitals  BP (!) 165/85   Pulse 81   Temp 98.5 °F (36.9 °C)   Resp 18   Ht 5' 4\" (1.626 m)   Wt 57.7 kg (127 lb 3.3 oz)   SpO2 97%   BMI 21.83 kg/m²      Tmax/24hrs: Temp (24hrs), Av.2 °F (36.8 °C), Min:97.8 °F (36.6 °C), Max:98.5 °F (36.9 °C)    Intake/Output Summary (Last 24 hours) at 2022 1247  Last data filed at 2022 0422  Gross per 24 hour   Intake 677 ml   Output 2800 ml   Net -2123 ml       General:  In NAD. Nontoxic-appearing. Cardiovascular:  RRR. Pulmonary:  Lungs clear bilaterally, no wheezes. No accessory muscle use. GI:  Abdomen soft, NTTP. Extremities:  Warm, no edema or ischemia. Neuro:  Awake and alert.   Moves extremities spontaneously, motor grossly nonfocal.    Labs:    Recent Results (from the past 24 hour(s))   GLUCOSE, POC    Collection Time: 22  8:57 PM   Result Value Ref Range    Glucose (POC) 160 (H) 70 - 110 mg/dL   RENAL FUNCTION PANEL    Collection Time: 22 10:58 AM   Result Value Ref Range Sodium 143 136 - 145 mmol/L    Potassium 3.4 (L) 3.5 - 5.5 mmol/L    Chloride 108 100 - 111 mmol/L    CO2 25 21 - 32 mmol/L    Anion gap 10 3.0 - 18 mmol/L    Glucose 133 (H) 74 - 99 mg/dL    BUN 72 (H) 7.0 - 18 MG/DL    Creatinine 4.36 (H) 0.6 - 1.3 MG/DL    BUN/Creatinine ratio 17 12 - 20      eGFR 13 (L) >60 ml/min/1.73m2    Calcium 9.3 8.5 - 10.1 MG/DL    Phosphorus 3.4 2.5 - 4.9 MG/DL    Albumin 2.5 (L) 3.4 - 5.0 g/dL   GLUCOSE, POC    Collection Time: 12/22/22 11:33 AM   Result Value Ref Range    Glucose (POC) 133 (H) 70 - 110 mg/dL       Signed By: Nicholas Sue MD     December 22, 2022

## 2022-12-22 NOTE — DIALYSIS
ACUTE HEMODIALYSIS FLOW SHEET    HEMODIALYSIS ORDERS: Physician: Dr. Rachel Mark: Daisy   Duration: 3 hr   BFR: 300   DFR: 500   Dialysate:  Temp 36-37*C   K+  3    Ca+ 2.5   Na 140   Bicarb 35   Wt Readings from Last 1 Encounters:   12/21/22 57.7 kg (127 lb 3.3 oz)    Patient Chart [x]   Unable to Obtain []  Dry weight/UF Goal: 2000 ml    Heparin []  Bolus    Units    [] Hourly    Units    [x]None       Pre BP: 151/79  Pulse: 72  Respirations: 18 Temp: 98.1 temporal  [] Oral  [] Ax  [] Esoph   Labs: []  Pre  []  Post:   [x] N/A   Additional Orders (medications, blood products, hypotension management): [] Yes   [x] No     [x]  [facility] Consent Verified     CATHETER ACCESS:    [x]Right   []Left   []IJ   []Fem  [x]Chest wall  []TransHepatic   [] First use X-ray verified     [x]Tunnel    [] Non Tunneled   [x]No S/S infection  []Redness  []Drainage []Cultured []Swelling []Pain   [x]Medical Aseptic Prep Utilized   []Dressing Changed  [] Biopatch  Date:    []Clotted   [x]Patent   Flows: [x]Good  []Poor  []Reversed   If access problem,  notified: []Yes    [x]N/A        GRAFT/FISTULA ACCESS:   [x]N/A                        Hospital: [facility]          Room # 2303/01    [x] Routine         [] 1st Time Acute/Chronic   [] Urgent      [] Stat            [x] Acute Room   []  Bedside    [] ICU/CCU     [] ER     Isolation Precautions:  [x] Dialysis    There are currently no Active Isolations     ALLERGIES:     No Known Allergies     Code Status:  Full Code     Hepatitis Status      Lab Results   Component Value Date/Time    Hepatitis B surface Ag <0.10 12/20/2022 10:20 PM    Hepatitis B surface Ab 9.78 (L) 12/20/2022 10:20 PM        Current Labs:      Lab Results   Component Value Date/Time    WBC 7.3 12/21/2022 05:48 AM    HGB 8.4 (L) 12/21/2022 05:48 AM    HCT 26.9 (L) 12/21/2022 05:48 AM    PLATELET 622 97/32/2596 05:48 AM    MCV 91.5 12/21/2022 05:48 AM     Lab Results   Component Value Date/Time    Sodium 143 12/22/2022 10:58 AM    Potassium 3.4 (L) 12/22/2022 10:58 AM    Chloride 108 12/22/2022 10:58 AM    CO2 25 12/22/2022 10:58 AM    Anion gap 10 12/22/2022 10:58 AM    Glucose 133 (H) 12/22/2022 10:58 AM    BUN 72 (H) 12/22/2022 10:58 AM    Creatinine 4.36 (H) 12/22/2022 10:58 AM    BUN/Creatinine ratio 17 12/22/2022 10:58 AM    GFR est AA 17 (L) 09/16/2022 08:48 AM    GFR est non-AA 14 (L) 09/16/2022 08:48 AM    Calcium 9.3 12/22/2022 10:58 AM          DIET:  DIET ADULT     PRIMARY NURSE REPORT:   Pre Dialysis: Jose Harmon RN    Time: 5233      EDUCATION:    [x] Patient           Knowledge Basis: []None []Minimal [x] Substantial [] Unknown  Barriers to learning  []None  [] Intubated/Trached/Ventilated  [] Sedated/Paralyzed   [x] Access Care     [] S&S of infection  [] Fluid Management  [] K+   [x] Procedural    [] Medications   [] Tx Options   [] Transplant   [] Diet      Teaching Tools:  [x] Explain  [] Demo  [] Handouts [] Video  Patient response: [x] Verbalized understanding   [] Requires follow up        [x] Time Out/Safety Check    [x] Extracorporeal Circuit Tested for integrity       RO/HEMODIALYSIS MACHINE SAFETY CHECKS - Before each treatment:        [address] [place] [address]                                     [x] Unit Machine # 9 with centralized RO                                  [] Portable Machine #1/RO serial # [id number]                                  [] Portable Machine #2/RO serial # [id number]                                  [] Portable Machine #4/RO serial # [id number]                                  [] Portable Machine #10/RO serial # [id number]                                                                                                       Alarm Test:  Pass time 6422            [x] RO/Machine Log Complete    Machine Temp    36-37*C             Dialysate: pH  7.4    Conductivity: Meter 14.0    HD Machine  9     TCD: 13.9  Dialyzer Lot # [id number]     Blood Tubing Lot # [id number]     Saline Lot # [id number]     GZOSSMAW TESTING-Before each treatment and every 4 hours    Total Chlorine: [x] less than 0.1 ppm  Initial Time Check: 1200       4 Hr/2nd Check Time: 1600   (if greater than 0.1 ppm from Primary then every 30 minutes from Secondary)     TREATMENT INITIATION - with Dialysis Precautions:   [x] All Connections Secured              [x] Saline Line Double Clamped   [x] Venous Parameters Set               [x] Arterial Parameters Set    [x] Prime Given 250ml NSS              [x]Air Foam Detector Engaged        Treatment Initiation Note:  SEE HD RN NOTE    During Treatment Notes:  9215  Face & Vascular access visible with art and vinita line connections intact. Pt tolerating dialysis. 1342  Face & Vascular access visible with art and vinita line connections intact. Pt tolerating dialysis. 1400  Face & Vascular access visible with art and vinita line connections intact. Pt tolerating dialysis. 1415  Face & Vascular access visible with art and vinita line connections intact. Pt tolerating dialysis. 1430  Face & Vascular access visible with art and vinita line connections intact. Pt tolerating dialysis. 1445  Face & Vascular access visible with art and vinita line connections intact. Pt tolerating dialysis. 1500  Face & Vascular access visible with art and vinita line connections intact. Pt tolerating dialysis. 1515  Face & Vascular access visible with art and vinita line connections intact. Pt tolerating dialysis. 1530  Face & Vascular access visible with art and vinita line connections intact. Pt tolerating dialysis. 1545  Face & Vascular access visible with art and vinita line connections intact. Pt tolerating dialysis. 1600  Face & Vascular access visible with art and vinita line connections intact. Pt tolerating dialysis. 1615  Face & Vascular access visible with art and vinita line connections intact. Pt tolerating dialysis. 1630  Face & Vascular access visible with art and vinita line connections intact.  Pt tolerating dialysis. 1648  Dialysis treatment complete.            Post Assessment  Dialyzer Cleared:   [x] Good  [] Fair  [] Poor  Blood processed:  51.1 L  UF Removed:  2000 Ml    Post BP: 142/74  Pulse: 63  Respirations: 18   Temp: 96.9 temporal  [] Oral  [] Ax  [] Esophageal   Lungs: [] Clear                [x] No change from initial assessment   Post Tx Vascular Access: [x] N/A       Cardiac:  [x] Regular   [] Irregular   Rhythm:  [] Monitored   [x] Not Monitored    CVC Catheter:   Locking solution: Heparin 1:1000 U  Arterial port 1.6 ml   Venous port 1.6 ml   Edema:  [x] None  [] Generalized                     Skin:[] Warm  [x] Dry [] Diaphoretic               [] Flushed  [] Pale [] Cyanotic Pain:  [x]0  []1-2  []3-4  []5-6   []7-8  []9-10         Post Treatment Note:   SEE HD RN NOTE     POST TREATMENT PRIMARY NURSE HANDOFF REPORT:   Post Dialysis: Marisa Lemon RN        Time:  6950       Abbreviations: AVG-arterial venous graft, AVF-arterial venous fistula, IJ-Internal Jugular, Subcl-Subclavian, Fem-Femoral, Tx-treatment, AP/HR-apical heart rate, VSS- Vital Signs Stable, CVC- Central Venous Catheter, DFR-dialysate flow rate, BFR-blood flow rate, AP-arterial pressure, -venous pressure, UF-ultrafiltrate, TMP-transmembrane pressure, Joni-Venous, Art-Arterial, RO-Reverse Osmosis

## 2022-12-22 NOTE — PROGRESS NOTES
In Patient Progress note    Admit Date: 12/19/2022    Impression:     #1 ESRD , now needing hemodialysis for volume overload and solute management   #2 Essential hypertension, uncontrolled  #3 AC on chronic Congestive heart failure  and pulmonary hypertension  #4 severe pulmonary hypertension, cardiomyopathy with EF 40%  #5 severe Aortic stenosis   #6 atherosclerotic disease  #6 h/o  homocystinemia  #7 anemia of chronic kidney disease  #8 secondary hyperparathyroidism     Plan:  #1 monitor strict I/o charting, ok to d/c kan  #2 HD today for volume and solute , TDC in place  #3 ANNA and venofer ( TSAT 8%)  #4 phosphorus, PTH ok  #5 BP should improve with improving volume status with HD ,   Will reassess after HD today   #6 wean O2 as tolerated     Patient does have a dialysis chair at [facility],  tts schedule ,   starting 12/27/22 @ 11:45 AM ( 1st day be 30 mins prior for paperwork)   Plan to transition to PD at later stage outpatient after stabilized on HD     Discussed with patient his wife and Dr. Lindsay Baldwin. Please call with questions,     Alejo Bonilla MD [facility]  Cell [phone]  Pager: [phone]     Subjective:     - feeling better  - respiratory - HFNC   - hemodynamics - stable, no pressrs  - UOP-minimal  - Nutrition -ok    Objective:     Visit Vitals  BP (!) 165/85   Pulse 81   Temp 98.5 °F (36.9 °C)   Resp 18   Ht 5' 4\" (1.626 m)   Wt 57.7 kg (127 lb 3.3 oz)   SpO2 97%   BMI 21.83 kg/m²         Intake/Output Summary (Last 24 hours) at 12/22/2022 1245  Last data filed at 12/22/2022 0422  Gross per 24 hour   Intake 677 ml   Output 2800 ml   Net -2123 ml         Physical Exam:     Patient is in no apparent distress. HEENT: mmm  Lungs: nicole rales   Cardiovascular system: S1, S2, regular rate and rhythm. Carotid upstroke 2 + bilaterally. Abdomen: soft, non tender, non distended. Extremities: no clubbing, cyanosis or edema.   Rt IJ TDC in place     Data Review:    Recent Labs     12/21/22  9379 WBC 7.3   RBC 2.94*   HCT 26.9*   MCV 91.5   MCH 28.6   MCHC 31.2   RDW 19.8*       Recent Labs     12/22/22  1058 12/21/22  0548 12/20/22  2220   BUN 72* 48*  --    CREA 4.36* 2.90*  --    CA 9.3 9.3 9.3   ALB 2.5*  --   --    K 3.4* 3.5  --     146*  --     112*  --    CO2 25 25  --    PHOS 3.4  --  4.4   * 99  --          Kenzie Boss MD

## 2022-12-22 NOTE — PROGRESS NOTES
0900  TRANSFER - IN REPORT:    Verbal report received from Ricardo Calderon (name) on Lugene Pool  being received from Vascular Lab (unit) for ordered procedure    Report consisted of patients Situation, Background, Assessment and   Recommendations(SBAR). Information from the following report(s) SBAR, Procedure Summary, and MAR was reviewed with the receiving nurse. Opportunity for questions and clarification was provided. Assessment completed upon patients arrival to unit and care assumed. Temp cath removed and permanent cath placed. On 15L O2 for 88% O2 saturation.

## 2022-12-23 LAB — GLUCOSE BLD STRIP.AUTO-MCNC: 128 MG/DL (ref 70–110)

## 2022-12-23 PROCEDURE — 99232 SBSQ HOSP IP/OBS MODERATE 35: CPT | Performed by: FAMILY MEDICINE

## 2022-12-23 PROCEDURE — 90935 HEMODIALYSIS ONE EVALUATION: CPT

## 2022-12-23 PROCEDURE — 74011250636 HC RX REV CODE- 250/636: Performed by: INTERNAL MEDICINE

## 2022-12-23 PROCEDURE — 99232 SBSQ HOSP IP/OBS MODERATE 35: CPT | Performed by: INTERNAL MEDICINE

## 2022-12-23 PROCEDURE — 77010033711 HC HIGH FLOW OXYGEN

## 2022-12-23 PROCEDURE — 65660000004 HC RM CVT STEPDOWN

## 2022-12-23 PROCEDURE — 74011250637 HC RX REV CODE- 250/637: Performed by: FAMILY MEDICINE

## 2022-12-23 PROCEDURE — 82962 GLUCOSE BLOOD TEST: CPT

## 2022-12-23 PROCEDURE — 74011000250 HC RX REV CODE- 250: Performed by: EMERGENCY MEDICINE

## 2022-12-23 PROCEDURE — 74011000250 HC RX REV CODE- 250: Performed by: FAMILY MEDICINE

## 2022-12-23 PROCEDURE — 94762 N-INVAS EAR/PLS OXIMTRY CONT: CPT

## 2022-12-23 RX ADMIN — SODIUM CHLORIDE, PRESERVATIVE FREE 10 ML: 5 INJECTION INTRAVENOUS at 06:07

## 2022-12-23 RX ADMIN — CARVEDILOL 25 MG: 25 TABLET, FILM COATED ORAL at 09:20

## 2022-12-23 RX ADMIN — IRON SUCROSE 200 MG: 20 INJECTION, SOLUTION INTRAVENOUS at 09:20

## 2022-12-23 RX ADMIN — BUMETANIDE 1 MG: 0.25 INJECTION INTRAMUSCULAR; INTRAVENOUS at 18:06

## 2022-12-23 RX ADMIN — ALLOPURINOL 100 MG: 100 TABLET ORAL at 09:20

## 2022-12-23 RX ADMIN — CLOPIDOGREL BISULFATE 75 MG: 75 TABLET ORAL at 09:20

## 2022-12-23 RX ADMIN — CARVEDILOL 25 MG: 25 TABLET, FILM COATED ORAL at 18:06

## 2022-12-23 RX ADMIN — SODIUM CHLORIDE, PRESERVATIVE FREE 10 ML: 5 INJECTION INTRAVENOUS at 14:17

## 2022-12-23 RX ADMIN — SODIUM CHLORIDE, PRESERVATIVE FREE 10 ML: 5 INJECTION INTRAVENOUS at 14:16

## 2022-12-23 RX ADMIN — BUMETANIDE 1 MG: 0.25 INJECTION INTRAMUSCULAR; INTRAVENOUS at 09:20

## 2022-12-23 RX ADMIN — ATORVASTATIN CALCIUM 40 MG: 40 TABLET, FILM COATED ORAL at 09:20

## 2022-12-23 NOTE — PROGRESS NOTES
SW attempted to visit with Patient to complete assessment. Patient was off the floor at Dialysis.      Case Management will follow-up      JAYMIE Blackwood    [facility]

## 2022-12-23 NOTE — PROGRESS NOTES
Bedside and Verbal shift change report given to [facility] [address] (oncoming nurse) by Marcell العلي RN (offgoing nurse). Report included the following information SBAR and MAR.

## 2022-12-23 NOTE — PROGRESS NOTES
Cardiology Progress Note    Admit Date: 12/19/2022  Attending Cardiologist: Dr. Sylvia Simon:     -Acute hypoxic respiratory failure, on HFNC  -Acute renal failure, started on HD this admission  -HFrEF, Echo 12/20/2022 with LVEF 40%, mild concentric LVH, moderately dilated RV, severely dilated LA, dilated RA. -Severe aortic stenosis, mean gradient 51 mmHg, peak gradient 92 mmHg, peak velocity 4.8 m/s by Echo 12/20/2022  -Severe pulmonary hypertension with est. TVSP 87 mmHg by Echo 12/20/2022  -HTN  -Hypercholesterolemia     Primary cardiologist at [address] [facility]:     Addendum: Independently seen and evaluated. Agree with below. After having 3 straight days of hemodialysis, he feels much improved. He feels that his breathing is back to baseline. I would continue his current medication regimen and wean down on his oxygen need as tolerated. Obviously, he has had significant aortic stenosis for some time; he will require further evaluation for his aortic stenosis. This can be done as inpatient or as outpatient.    -Continue Coreg, Lipitor, Plavix. Will plan to add further GDMT for CMY if BP remains stable. -O2 requirements weaned overnight, currently on 5LNC, continue to monitor closely. -Volume management via HD per nephrology team, appreciate assistance.  -Will need further consideration for further cardiac evaluation/cardiac catheterization and aortic valve intervention. Subjective:     No new complaints.      Objective:      Patient Vitals for the past 8 hrs:   Temp Pulse Resp BP SpO2   12/23/22 0400 99 °F (37.2 °C) 76 20 137/76 98 %         Patient Vitals for the past 96 hrs:   Weight   12/23/22 0655 63 kg (138 lb 14.2 oz)   12/21/22 2135 57.7 kg (127 lb 3.3 oz)   12/21/22 1626 57.1 kg (125 lb 14.1 oz)   12/20/22 1447 61.2 kg (135 lb)                Current Facility-Administered Medications   Medication Dose Route Frequency Last Admin    iron sucrose (VENOFER) injection 200 mg  200 mg IntraVENous DAILY 200 mg at 12/22/22 1724    epoetin leon-epbx (RETACRIT) injection 10,000 Units  10,000 Units SubCUTAneous Q MON, WED & FRI 10,000 Units at 12/21/22 2053    doxercalciferoL (HECTOROL) 4 mcg/2 mL injection 4 mcg  4 mcg IntraVENous DIALYSIS MON, WED & FRI 4 mcg at 12/21/22 1417    sodium chloride (NS) flush 5-40 mL  5-40 mL IntraVENous Q8H 10 mL at 12/23/22 7242    sodium chloride (NS) flush 5-40 mL  5-40 mL IntraVENous PRN      acetaminophen (TYLENOL) tablet 650 mg  650 mg Oral Q6H PRN      Or    acetaminophen (TYLENOL) suppository 650 mg  650 mg Rectal Q6H PRN      polyethylene glycol (MIRALAX) packet 17 g  17 g Oral DAILY PRN      ondansetron (ZOFRAN ODT) tablet 4 mg  4 mg Oral Q8H PRN      Or    ondansetron (ZOFRAN) injection 4 mg  4 mg IntraVENous Q6H PRN      allopurinoL (ZYLOPRIM) tablet 100 mg  100 mg Oral DAILY 100 mg at 12/22/22 1213    clopidogreL (PLAVIX) tablet 75 mg  75 mg Oral DAILY 75 mg at 12/22/22 1213    carvediloL (COREG) tablet 25 mg  25 mg Oral BID WITH MEALS 25 mg at 12/22/22 1724    atorvastatin (LIPITOR) tablet 40 mg  40 mg Oral DAILY 40 mg at 12/22/22 1213    bumetanide (BUMEX) injection 1 mg  1 mg IntraVENous BID 1 mg at 12/22/22 1724    heparin (porcine) 1,000 unit/mL injection 1,000 Units  1,000 Units Hemodialysis DIALYSIS PRN      sodium chloride (NS) flush 5-40 mL  5-40 mL IntraVENous Q8H 10 mL at 12/23/22 5000    sodium chloride (NS) flush 5-40 mL  5-40 mL IntraVENous PRN           Intake/Output Summary (Last 24 hours) at 12/23/2022 0843  Last data filed at 12/22/2022 1643  Gross per 24 hour   Intake 240 ml   Output 2000 ml   Net -1760 ml       Physical Exam:  General:  alert, cooperative, no distress, appears stated age  Neck:  supple  Lungs:  clear to auscultation bilaterally  Heart:  regular rate and rhythm  Abdomen:  abdomen is soft without significant tenderness, masses, organomegaly or guarding  Extremities:  atraumatic, no edema    Visit Vitals  /76 (BP 1 Location: Left upper arm, BP Patient Position: At rest)   Pulse 76   Temp 99 °F (37.2 °C)   Resp 20   Ht 5' 4\" (1.626 m)   Wt 63 kg (138 lb 14.2 oz)   SpO2 98%   BMI 23.84 kg/m²       Data Review:     Labs: Results:       Chemistry Recent Labs     12/22/22  1058 12/21/22  0548 12/20/22  2220   * 99  --     146*  --    K 3.4* 3.5  --     112*  --    CO2 25 25  --    BUN 72* 48*  --    CREA 4.36* 2.90*  --    CA 9.3 9.3 9.3   PHOS 3.4  --  4.4   AGAP 10 9  --    BUCR 17 17  --    ALB 2.5*  --   --       CBC w/Diff Recent Labs     12/21/22  0548   WBC 7.3   RBC 2.94*   HGB 8.4*   HCT 26.9*      GRANS 82*   LYMPH 10*   EOS 0      Lipid Panel Lab Results   Component Value Date/Time    Cholesterol, total 206 (H) 05/19/2010 09:29 AM    HDL Cholesterol 49 05/19/2010 09:29 AM    LDL, calculated 147.4 (H) 05/19/2010 09:29 AM    VLDL, calculated 9.6 05/19/2010 09:29 AM    Triglyceride 48 05/19/2010 09:29 AM    CHOL/HDL Ratio 4.2 05/19/2010 09:29 AM      Liver Enzymes Recent Labs     12/22/22  1058   ALB 2.5*        Signed By: Mo Dominguez PA-C     December 23, 2022 Mid-Level Procedure Text (A): After obtaining clear surgical margins the patient was sent to a mid-level provider for surgical repair.  The patient understands they will receive post-surgical care and follow-up from the mid-level provider.

## 2022-12-23 NOTE — PROGRESS NOTES
In Patient Progress note    Admit Date: 12/19/2022    Impression:     #1 ESRD , now needing hemodialysis for volume overload and solute management   #2 Essential hypertension, uncontrolled  #3 AC on chronic Congestive heart failure  and pulmonary hypertension  #4 severe pulmonary hypertension, cardiomyopathy with EF 40%  #5 severe Aortic stenosis   #6 atherosclerotic disease  #6 h/o  homocystinemia  #7 anemia of chronic kidney disease  #8 secondary hyperparathyroidism     Plan:  #1 monitor strict I/o charting, ok to d/c kan  #2 IUF today and HD tomorrow   #3 ANNA and venofer ( TSAT 8%)  #4 phosphorus, PTH ok  #5 BP should improve with improving volume status with HD ,   #6 wean O2 as tolerated   #7 noted cardiology recs regarding Aortic stenosis     Patient does have a dialysis chair at [facility] view,  tts schedule ,   starting 12/27/22 @ 11:45 AM ( 1st day be 30 mins prior for paperwork)   Plan to transition to PD at later stage outpatient after stabilized on HD     Discussed with patient his wife and Dr. Lidia Montero. Please call with questions,     Puma Quiles MD FASN  Cell [phone]  Pager: [phone]     Subjective:     - feeling better  - respiratory - HFNC   - hemodynamics - stable, no pressrs  - UOP-minimal  - Nutrition -ok    Objective:     Visit Vitals  /63 (BP 1 Location: Left upper arm, BP Patient Position: At rest)   Pulse 65   Temp 98.8 °F (37.1 °C)   Resp 20   Ht 5' 4\" (1.626 m)   Wt 63 kg (138 lb 14.2 oz)   SpO2 98%   BMI 23.84 kg/m²         Intake/Output Summary (Last 24 hours) at 12/23/2022 1242  Last data filed at 12/22/2022 1643  Gross per 24 hour   Intake 240 ml   Output 2000 ml   Net -1760 ml         Physical Exam:     Patient is in no apparent distress. HEENT: mmm  Lungs: nicole rales   Cardiovascular system: S1, S2, regular rate and rhythm. Carotid upstroke 2 + bilaterally. Abdomen: soft, non tender, non distended. Extremities: no clubbing, cyanosis or edema.   Rt IJ TDC in place     Data Review:    Recent Labs     12/21/22  0548   WBC 7.3   RBC 2.94*   HCT 26.9*   MCV 91.5   MCH 28.6   MCHC 31.2   RDW 19.8*       Recent Labs     12/22/22  1058 12/21/22  0548 12/20/22  2220   BUN 72* 48*  --    CREA 4.36* 2.90*  --    CA 9.3 9.3 9.3   ALB 2.5*  --   --    K 3.4* 3.5  --     146*  --     112*  --    CO2 25 25  --    PHOS 3.4  --  4.4   * 99  --          Cipriano Baxter MD

## 2022-12-23 NOTE — DIALYSIS
PRAVEEN    CATHETER ACCESS: []N/A   [x]Right   []Left   [x]IJ     []Fem   [x]chest wall   [] First use X-ray verified     [x]Tunnel                [] Non Tunneled   [x]No S/S infection  []Redness  []Drainage []Cultured []Swelling []Pain   [x]Medical Aseptic Prep Utilized   []Dressing Changed  [] Biopatch  Date:       []Clotted   [x]Patent   Flows: [x]Good  []Poor  []Reversed   If access problem,  notified: []Yes    [x]N/A  Date:                       GENERAL ASSESSMENT:      LUNGS:  Rate  SaO2% [] N/A    [x] Clear  [] Coarse  [] Crackles  [] Wheezing        [] Diminished     Location : []RLL   []LLL    []RUL  []ARMIDA     Cough: []Productive  []Dry  [x]N/A   Respirations:  [x]Easy  []Labored     Therapy:   []RA  [x]NC 5 l/min    Mask: []NRB []Venti       O2%                  []Ventilator  []Intubated  [] Trach  [] BiPaP     CARDIAC: [x]Regular      [] Irregular   [] Pericardial Rub  [] JVD        []  Monitored  [] Bedside  [] Remotely monitored [] N/A  Rhythm:      EDEMA: [] None  [x]Generalized  [] Pitting [] 1    [] 2    [] 3    [] 4                 [] Facial  [] Pedal  []  UE  [] LE     SKIN:   [x] Warm  [] Hot     [] Cold   [x] Dry     [] Pale   [] Diaphoretic                  [] Flushed  [] Jaundiced  [] Cyanotic  [] Rash  [] Weeping     LOC:    [x] Alert      [x]Oriented:    [x] Person     [x] Place  [x]Time               [] Confused  [] Lethargic  [] Medicated  [] Non-responsive     GI / ABDOMEN:  [] Flat    [] Distended    [x] Soft    [] Firm   []  Obese                             [] Diarrhea  [x] Bowel Sounds  [] Nausea  [] Vomiting       / URINE ASSESSMENT:[] Voiding   [x] Oliguria  [] Anuria   []  Hernandez     [] Incontinent    []  Incontinent Brief      []  Bathroom Privileges       PAIN: [x] 0 []1  []2   []3   []4   []5   []6   []7   []8   []9   []10              Scale 0-10  Action/Follow Up:      MOBILITY:  [] Amb    [] Amb/Assist    [x] Bed    [] Wheelchair  [] Stretcher      All Vitals and Treatment Details on Attached Flowsheet

## 2022-12-23 NOTE — PROGRESS NOTES
36 Obtained nursing report from SHANA Nunez to send transport for dialysis. 1335 Received patient awake, alert,reactive and is cooperative with care. On NBM at 100% O2. No SOB was noted. Maintained HOB at [place].   GENERAL ASSESSMENT:    LUNGS:  Resp Rate 20   [x] Clear  [] Coarse  [] Crackles  [] Wheezing  [x] Diminished                                                           [] RLL   [] LLL  [x] RUL   [x] ARMIDA            Respirations:  [x]Easy  []Labored  []N/A  Cough:  []Productive  []Dry  []N/A               Therapy:  []RA   [] Ventilated   [] Intubated   [] Trach            O2 Device:  [] NC   [x] NRB  [] Trach Mask  [] BiPaP  Flow:   l/min                                                    CARDIAC: [x] Regular  radially    [] Irregular   [] Rhythm:          [] Monitored   [] Bedside   [] Remotely monitored       EDEMA: [x] None   []Generalized  [] Pitting [] 1+   [] 2 +   [] 3+    [] 4+        SKIN:   [] Hot     [] Cold    [x] Warm   [x] Dry    [] Diaphoretic                 [] Flushed  [] Jaundiced  [] Cyanotic  [] Pale      LOC:    [x] Alert      [x]Oriented:    [x] Person     [x] Place   [x]Time               [] Confused  [] Lethargic  [] Medicated  [] Non-responsive  [] Non-Verbal     GI / ABDOMEN:                     [x] Flat    [] Distended    [x] Soft    [] Firm   []  Obese                   [] Diarrhea   [] FMS [x] Bowel Sounds  audible[] Nausea  [] Vomiting                   [] NGT  [] OGT  [] PEG  [] Tube Feedings @     mL/hr     / URINE ASSESSMENT:                   [x] Voiding    [] Oliguria  [] Anuria                     []  Hernandez   [] Incontinent  []  Incontinent Brief   []  PureWick     PAIN:  [x] 0 []1  []2   []3   []4   []5   []6   []7   []8   []9   []10                MOBILITY:  [x] Bed    [] Stretcher      All Vitals and Treatment Details on Attached Flowsheet

## 2022-12-23 NOTE — DIALYSIS
ACUTE HEMODIALYSIS FLOW SHEET    HEMODIALYSIS ORDERS: Physician: Dr. Gould Pop: Revaclear   Duration: 2.5 hr   BFR: 250   DFR: 0   Dialysate:  Temp 36-37*C   K+  2    Ca+ 2.5   Na 140   Bicarb 35   Wt Readings from Last 1 Encounters:   12/23/22 63 kg (138 lb 14.2 oz)    Patient Chart [x]   Unable to Obtain []  Dry weight/UF Goal: 2000 ml    Heparin []  Bolus    Units    [] Hourly    Units    []None       Pre BP: 128/76  Pulse: 71  Respirations: 18 Temp: 97.9 temporal  [] Oral  [] Ax  [] Esoph   Labs: []  Pre  []  Post:   [x] N/A   Additional Orders (medications, blood products, hypotension management): [] Yes   [x] No     [x]  [facility] Consent Verified     CATHETER ACCESS:    [x]Right   []Left   []IJ   []Fem  [x]Chest wall  []TransHepatic   [] First use X-ray verified     [x]Tunnel    [] Non Tunneled   [x]No S/S infection  []Redness  []Drainage []Cultured []Swelling []Pain   [x]Medical Aseptic Prep Utilized   []Dressing Changed  [] Biopatch  Date:    []Clotted   [x]Patent   Flows: [x]Good  []Poor  []Reversed   If access problem,  notified: []Yes    [x]N/A        GRAFT/FISTULA ACCESS:   [x]N/A                      Hospital: [facility]          Room # 2303/01    [x] Routine         [] 1st Time Acute/Chronic   [] Urgent      [] Stat            [x] Acute Room   []  Bedside    [] ICU/CCU     [] ER     Isolation Precautions:  [x] Dialysis    There are currently no Active Isolations     ALLERGIES:     No Known Allergies     Code Status:  Full Code     Hepatitis Status      Lab Results   Component Value Date/Time    Hepatitis B surface Ag <0.10 12/20/2022 10:20 PM    Hepatitis B surface Ab 9.78 (L) 12/20/2022 10:20 PM        Current Labs:      Lab Results   Component Value Date/Time    WBC 7.3 12/21/2022 05:48 AM    HGB 8.4 (L) 12/21/2022 05:48 AM    HCT 26.9 (L) 12/21/2022 05:48 AM    PLATELET 894 32/27/6639 05:48 AM    MCV 91.5 12/21/2022 05:48 AM     Lab Results   Component Value Date/Time    Sodium 143 12/22/2022 10:58 AM    Potassium 3.4 (L) 12/22/2022 10:58 AM    Chloride 108 12/22/2022 10:58 AM    CO2 25 12/22/2022 10:58 AM    Anion gap 10 12/22/2022 10:58 AM    Glucose 133 (H) 12/22/2022 10:58 AM    BUN 72 (H) 12/22/2022 10:58 AM    Creatinine 4.36 (H) 12/22/2022 10:58 AM    BUN/Creatinine ratio 17 12/22/2022 10:58 AM    GFR est AA 17 (L) 09/16/2022 08:48 AM    GFR est non-AA 14 (L) 09/16/2022 08:48 AM    Calcium 9.3 12/22/2022 10:58 AM          DIET:  DIET ADULT     PRIMARY NURSE REPORT:   Pre Dialysis: Kosta Chen RN    Time: 6348      EDUCATION:    [x] Patient           Knowledge Basis: []None []Minimal [x] Substantial [] Unknown  Barriers to learning  [x]None  [] Intubated/Trached/Ventilated  [] Sedated/Paralyzed   [x] Access Care     [] S&S of infection  [] Fluid Management  [] K+   [x] Procedural    [] Medications   [] Tx Options   [] Transplant   [] Diet      Teaching Tools:  [x] Explain  [] Demo  [] Handouts [] Video  Patient response: [x] Verbalized understanding   [] Requires follow up        [x] Time Out/Safety Check    [x] Extracorporeal Circuit Tested for integrity       RO/HEMODIALYSIS MACHINE SAFETY CHECKS - Before each treatment:        [address] [place] [address]                                     [x] Unit Machine # 9 with centralized RO                                  [] Portable Machine #1/RO serial # [id number]                                  [] Portable Machine #2/RO serial # [id number]                                  [] Portable Machine #4/RO serial # [id number]                                  [] Portable Machine #10/RO serial # [id number]                                                                                                       Alarm Test:  Pass time 1309            [x] RO/Machine Log Complete    Machine Temp    36-37*C             Dialysate: pH  7.4    Conductivity: Meter 14.0    HD Machine  9     TCD: 13.8  Dialyzer Lot # [id number]     Blood Tubing Lot # [id number]     Saline Lot # [id number]     XYDRCVHD TESTING-Before each treatment and every 4 hours    Total Chlorine: [x] less than 0.1 ppm  Initial Time Check: 1300       4 Hr/2nd Check Time: 1700   (if greater than 0.1 ppm from Primary then every 30 minutes from Secondary)     TREATMENT INITIATION - with Dialysis Precautions:   [x] All Connections Secured              [x] Saline Line Double Clamped   [x] Venous Parameters Set               [x] Arterial Parameters Set    [x] Prime Given 250ml NSS              [x]Air Foam Detector Engaged        Treatment Initiation Note:  SEE HD RN NOTE    During Treatment Notes:  7144  Face & Vascular access visible with art and vinita line connections intact. Pt tolerating dialysis. 1434  Face & Vascular access visible with art and vinita line connections intact. Pt tolerating dialysis. 1445  Face & Vascular access visible with art and vinita line connections intact. Pt tolerating dialysis. 1500  Face & Vascular access visible with art and vinita line connections intact. Pt tolerating dialysis. 1515  Face & Vascular access visible with art and vinita line connections intact. Pt tolerating dialysis. 1530  Face & Vascular access visible with art and vinita line connections intact. Pt tolerating dialysis. 1545  Face & Vascular access visible with art and vinita line connections intact. Pt tolerating dialysis. 1600  Face & Vascular access visible with art and vinita line connections intact. Pt tolerating dialysis. 1615  Face & Vascular access visible with art and vinita line connections intact. Pt tolerating dialysis. 1630  Face & Vascular access visible with art and vinita line connections intact. Pt tolerating dialysis. 1645  Face & Vascular access visible with art and vinita line connections intact. Pt tolerating dialysis. 1700  Face & Vascular access visible with art and vinita line connections intact. Pt tolerating dialysis. 1715  Face & Vascular access visible with art and vinita line connections intact.  Pt tolerating dialysis. 1710  Dialysis treatment complete.            Post Assessment  Dialyzer Cleared:   [x] Good  [] Fair  [] Poor  Blood processed:  0 L  UF Removed:  2000 Ml    Post BP: 1258/82  Pulse: 73  Respirations: 18   Temp: 97.0 temporal  [] Oral  [] Ax  [] Esophageal   Lungs: [] Clear                [x] No change from initial assessment   Post Tx Vascular Access: [x] N/A       Cardiac:  [x] Regular   [] Irregular   Rhythm:  [] Monitored   [x] Not Monitored    CVC Catheter:   Locking solution: Heparin 1:1000 U  Arterial port 1.6 ml   Venous port 1.6 ml   Edema:  [x] None  [] Generalized                     Skin:[] Warm  [x] Dry [] Diaphoretic               [] Flushed  [] Pale [] Cyanotic Pain:  [x]0  []1-2  []3-4  []5-6   []7-8  []9-10         Post Treatment Note:   SEE HD RN NOTE     POST TREATMENT PRIMARY NURSE HANDOFF REPORT:   Post Dialysis: Angeli Monson, RN        Time:  1722       Abbreviations: AVG-arterial venous graft, AVF-arterial venous fistula, IJ-Internal Jugular, Subcl-Subclavian, Fem-Femoral, Tx-treatment, AP/HR-apical heart rate, VSS- Vital Signs Stable, CVC- Central Venous Catheter, DFR-dialysate flow rate, BFR-blood flow rate, AP-arterial pressure, -venous pressure, UF-ultrafiltrate, TMP-transmembrane pressure, Joni-Venous, Art-Arterial, RO-Reverse Osmosis

## 2022-12-23 NOTE — PROGRESS NOTES
1900: Bedside and Verbal shift change report given to ZAN Willoughby (oncoming nurse) by Ayaan Jesus RN (offgoing nurse). Report included the following information SBAR, Kardex, MAR, and Cardiac Rhythm NSR . Right chest TDC placed today  25 L @ 40%  Took off 2L at dialysis  Regular renal diet    1140: Respiratory therapist Christy Lawrence stated that he is going to remove patient from hi-volodymyr O2, he feels no longer clinically necessary. Will continue to monitor. 0700: Bedside and Verbal shift change report given to Saeid Rao RN (oncoming nurse) by Bernie Crawford RN (offgoing nurse). Report included the following information SBAR, Kardex, MAR, and Cardiac Rhythm NSR .

## 2022-12-23 NOTE — PROGRESS NOTES
Problem: Chronic Renal Failure  Goal: *Fluid and electrolytes stabilized  Outcome: Progressing Towards Goal     Problem: Patient Education: Go to Patient Education Activity  Goal: Patient/Family Education  Outcome: Progressing Towards Goal     Problem: Falls - Risk of  Goal: *Absence of Falls  Description: Document [place] Fall Risk and appropriate interventions in the flowsheet.   Outcome: Progressing Towards Goal  Note: Fall Risk Interventions:  Mobility Interventions: Bed/chair exit alarm, Communicate number of staff needed for ambulation/transfer, Patient to call before getting OOB, Utilize walker, cane, or other assistive device         Medication Interventions: Assess postural VS orthostatic hypotension, Bed/chair exit alarm, Evaluate medications/consider consulting pharmacy, Patient to call before getting OOB, Teach patient to arise slowly    Elimination Interventions: Bed/chair exit alarm, Call light in reach, Patient to call for help with toileting needs, Toileting schedule/hourly rounds              Problem: Patient Education: Go to Patient Education Activity  Goal: Patient/Family Education  Outcome: Progressing Towards Goal

## 2022-12-24 VITALS
HEIGHT: 64 IN | DIASTOLIC BLOOD PRESSURE: 74 MMHG | RESPIRATION RATE: 19 BRPM | OXYGEN SATURATION: 98 % | WEIGHT: 138.89 LBS | BODY MASS INDEX: 23.71 KG/M2 | TEMPERATURE: 98.8 F | HEART RATE: 74 BPM | SYSTOLIC BLOOD PRESSURE: 133 MMHG

## 2022-12-24 LAB
GLUCOSE BLD STRIP.AUTO-MCNC: 107 MG/DL (ref 70–110)
GLUCOSE BLD STRIP.AUTO-MCNC: 113 MG/DL (ref 70–110)
GLUCOSE BLD STRIP.AUTO-MCNC: 132 MG/DL (ref 70–110)

## 2022-12-24 PROCEDURE — 74011250637 HC RX REV CODE- 250/637: Performed by: INTERNAL MEDICINE

## 2022-12-24 PROCEDURE — 74011250637 HC RX REV CODE- 250/637: Performed by: FAMILY MEDICINE

## 2022-12-24 PROCEDURE — 74011000250 HC RX REV CODE- 250: Performed by: EMERGENCY MEDICINE

## 2022-12-24 PROCEDURE — 99232 SBSQ HOSP IP/OBS MODERATE 35: CPT | Performed by: INTERNAL MEDICINE

## 2022-12-24 PROCEDURE — 74011250636 HC RX REV CODE- 250/636: Performed by: INTERNAL MEDICINE

## 2022-12-24 PROCEDURE — 82962 GLUCOSE BLOOD TEST: CPT

## 2022-12-24 PROCEDURE — 2709999900 HC NON-CHARGEABLE SUPPLY

## 2022-12-24 PROCEDURE — 74011000250 HC RX REV CODE- 250: Performed by: FAMILY MEDICINE

## 2022-12-24 RX ORDER — BUMETANIDE 2 MG/1
2 TABLET ORAL 2 TIMES DAILY
Qty: 60 TABLET | Refills: 0 | Status: SHIPPED | OUTPATIENT
Start: 2022-12-24

## 2022-12-24 RX ORDER — BUMETANIDE 1 MG/1
2 TABLET ORAL 2 TIMES DAILY
Status: DISCONTINUED | OUTPATIENT
Start: 2022-12-24 | End: 2022-12-24 | Stop reason: HOSPADM

## 2022-12-24 RX ADMIN — SODIUM CHLORIDE, PRESERVATIVE FREE 10 ML: 5 INJECTION INTRAVENOUS at 00:17

## 2022-12-24 RX ADMIN — SODIUM CHLORIDE, PRESERVATIVE FREE 10 ML: 5 INJECTION INTRAVENOUS at 15:12

## 2022-12-24 RX ADMIN — SODIUM CHLORIDE, PRESERVATIVE FREE 10 ML: 5 INJECTION INTRAVENOUS at 05:25

## 2022-12-24 RX ADMIN — EPOETIN ALFA-EPBX 10000 UNITS: 10000 INJECTION, SOLUTION INTRAVENOUS; SUBCUTANEOUS at 00:20

## 2022-12-24 RX ADMIN — ATORVASTATIN CALCIUM 40 MG: 40 TABLET, FILM COATED ORAL at 09:06

## 2022-12-24 RX ADMIN — CARVEDILOL 25 MG: 25 TABLET, FILM COATED ORAL at 09:06

## 2022-12-24 RX ADMIN — ALLOPURINOL 100 MG: 100 TABLET ORAL at 09:06

## 2022-12-24 RX ADMIN — BUMETANIDE 2 MG: 1 TABLET ORAL at 11:04

## 2022-12-24 RX ADMIN — CLOPIDOGREL BISULFATE 75 MG: 75 TABLET ORAL at 09:06

## 2022-12-24 RX ADMIN — BUMETANIDE 1 MG: 0.25 INJECTION INTRAMUSCULAR; INTRAVENOUS at 09:05

## 2022-12-24 RX ADMIN — SODIUM CHLORIDE, PRESERVATIVE FREE 10 ML: 5 INJECTION INTRAVENOUS at 05:26

## 2022-12-24 NOTE — PROGRESS NOTES
1900: Bedside and Verbal shift change report given to ZAN Willoughby (oncoming nurse) by Marlon Perez RN (offgoing nurse). Report included the following information SBAR, Kardex, MAR, and Cardiac Rhythm NSR . Took off 2L today at dialysis. 3 loose bowel movements today. Incentive spirometer. 0700: Bedside and Verbal shift change report given to Marlon Perez RN (oncoming nurse) by Cyrus Kaur RN (offgoing nurse). Report included the following information SBAR, Kardex, MAR, and Cardiac Rhythm NSR .

## 2022-12-24 NOTE — PROGRESS NOTES
Bedside and Verbal shift change report given to [facility] [address] (oncoming nurse) by Manasa Jolly RN (offgoing nurse). Report included the following information SBAR, Intake/Output, and MAR.

## 2022-12-24 NOTE — PROGRESS NOTES
[facility] Hospitalists  Progress Note    Patient: Franz Cranker Age: 80 y.o. : 1940 MR#: [id number] SSN: [id number]  Date: 2022     Subjective/24-hour events:     Off of oxygen currently, no new complaints. Assessment:   Acute respiratory failure with hypoxia  Acute systolic CHF EF 61%  MADHU on CKD 4  Pulmonary hypertension  Anemia of chronic disease  Secondary hyperparathyroidism  Gout  Advanced age    Plan:   Home today after HD if stable. D/W Dr. Sheron Ford. [facility] orders placed, CM made aware of plan. Case discussed with:  [x]Patient  []Family  [x]Nursing  [x]Case Management  DVT Prophylaxis:  []Lovenox  []Hep SQ  [x]SCDs  []Coumadin   []On Heparin gtt    Objective:   VS: Visit Vitals  /80   Pulse 67   Temp 98.2 °F (36.8 °C)   Resp 19   Ht 5' 4\" (1.626 m)   Wt 63 kg (138 lb 14.2 oz)   SpO2 100%   BMI 23.84 kg/m²      Tmax/24hrs: Temp (24hrs), Av.2 °F (36.8 °C), Min:97 °F (36.1 °C), Max:98.8 °F (37.1 °C)    Intake/Output Summary (Last 24 hours) at 2022 0936  Last data filed at 2022 9831  Gross per 24 hour   Intake 440 ml   Output 2300 ml   Net -1860 ml       General:  In NAD. Nontoxic-appearing. Cardiovascular:  RRR. Pulmonary:  Lungs clear bilaterally, no wheezes. No accessory muscle use. GI:  Abdomen soft, NTTP. Extremities:  Warm, no edema or ischemia. Neuro:  Awake and alert.   Moves extremities spontaneously, motor grossly nonfocal.    Labs:    Recent Results (from the past 24 hour(s))   GLUCOSE, POC    Collection Time: 22  5:36 PM   Result Value Ref Range    Glucose (POC) 128 (H) 70 - 110 mg/dL   GLUCOSE, POC    Collection Time: 22  7:58 AM   Result Value Ref Range    Glucose (POC) 107 70 - 110 mg/dL       Signed By: Joseph Leroy MD     2022

## 2022-12-24 NOTE — PROGRESS NOTES
Patient is on regular NC at this time and 1900 [facility] is off in corner of room. SpO2 98% on 5L NC. Vapotherm removed from room at this time. No signs of respiratory distress noted.

## 2022-12-24 NOTE — PROGRESS NOTES
In Patient Progress note    Admit Date: 12/19/2022    Impression:   #1 ESRD on  hemodialysis- new start  #2 Essential hypertension, better controlled  #3 AC on chronic Congestive heart failure--> improved  and pulmonary hypertension  #4 severe pulmonary hypertension, cardiomyopathy with EF 40%  #5 severe Aortic stenosis   #6 atherosclerotic disease  #6 h/o homocystinemia  #7 anemia of chronic kidney disease  #8 secondary hyperparathyroidism     Plan:  #1 improved volume status will keep on Bumex 2 mg BID on non dialysis days . #2 ANNA and venofer ( TSAT 8%)  #3 phosphorus, PTH ok  #4 continue Coreg  #5 wean O2   #6 noted cardiology recs regarding Aortic stenosis . Patient does have a dialysis chair at [facility],  tts schedule ,   starting 12/27/22 @ 11:45 AM ( 1st day be 30 mins prior for paperwork)   Plan to transition to PD at later stage outpatient after stabilized on HD    Hopefull can get home later today if able to be off O2 , discussed with Dr Michelle Carry     Please call with questions,     Tee Vang MD FASN  Cell [phone]  Pager: [phone]     Subjective:     - feeling better  - respiratory - on 5 lit NC  - hemodynamics - stable, no pressrs  - UOP-minimal  - Nutrition -ok    Objective:     Visit Vitals  /80   Pulse 67   Temp 98.2 °F (36.8 °C)   Resp 19   Ht 5' 4\" (1.626 m)   Wt 63 kg (138 lb 14.2 oz)   SpO2 100%   BMI 23.84 kg/m²         Intake/Output Summary (Last 24 hours) at 12/24/2022 1023  Last data filed at 12/24/2022 0936  Gross per 24 hour   Intake 560 ml   Output 2300 ml   Net -1740 ml         Physical Exam:     Patient is in no apparent distress. HEENT: mmm  Lungs: nicole rales   Cardiovascular system: S1, S2, regular rate and rhythm. Carotid upstroke 2 + bilaterally. Abdomen: soft, non tender, non distended. Extremities: no clubbing, cyanosis or edema.   Rt IJ TDC in place     Data Review:    No results for input(s): WBC, RBC, HCT, MCV, MCH, MCHC, RDW, HCTEXT, HCTEXT in the last 72 hours.     No lab exists for component: HEMOGLOBIN, PLATELET, MPV    Recent Labs     12/22/22  1058   BUN 72*   CREA 4.36*   CA 9.3   ALB 2.5*   K 3.4*         CO2 25   PHOS 3.4   *         Shyam Alcantara MD

## 2022-12-24 NOTE — PROGRESS NOTES
[facility]  -  Progress Note      Patient: Anna Burgos MRN: [id number]  SSN: [id number]    YOB: 1940  Age: 80 y.o. Sex: male      Admit Date: 12/19/2022    Assessment:     Hospital Problems  Date Reviewed: 5/5/2022            Codes Class Noted POA    Acute respiratory failure with hypoxia [facility]) ICD-10-CM: J96.01  ICD-9-CM: 518.81  12/19/2022 Unknown        Acute decompensated heart failure ([facility] [address] ICD-10-CM: I50.9  ICD-9-CM: 428.0  12/19/2022 Unknown        Acute kidney injury superimposed on chronic kidney disease ([facility] [address] ICD-10-CM: N17.9, N18.9  ICD-9-CM: 584.9, 585.9  12/19/2022 Unknown         -Acute hypoxic respiratory failure, initially on HFNC  -Acute renal failure, started on HD this admission  -HFrEF, Echo 12/20/2022 with LVEF 40%, mild concentric LVH, moderately dilated RV, severely dilated LA, dilated RA. -Severe aortic stenosis, mean gradient 51 mmHg, peak gradient 92 mmHg, peak velocity 4.8 m/s by Echo 12/20/2022  -Severe pulmonary hypertension with est. TVSP 87 mmHg by Echo 12/20/2022  -HTN  -Hypercholesterolemia     Primary cardiologist at [address]:     Patient breathing at baseline. He has no new complaints. Will defer electrolyte replacement and volume management to nephrology. He does have significant aortic stenosis. His primary cardiologist with [facility] is currently evaluating. Will defer his evaluation to his primary cardiologist.  From cardiac standpoint, he can be discharged to home on his current cardiac medication regimen with follow-up through his primary cardiologist.    Subjective:     No new complaints.      Objective:      Patient Vitals for the past 8 hrs:   Temp Pulse Resp BP SpO2   12/24/22 0801 98.2 °F (36.8 °C) 67 19 138/80 100 %   12/24/22 0434 98.5 °F (36.9 °C) 65 19 135/69 98 %         Patient Vitals for the past 96 hrs:   Weight   12/23/22 0655 63 kg (138 lb 14.2 oz)   12/21/22 2132 57.7 kg (127 lb 3.3 oz)   12/21/22 5727 57.1 kg (125 lb 14.1 oz)   12/20/22 1447 61.2 kg (135 lb)         Intake/Output Summary (Last 24 hours) at 12/24/2022 1050  Last data filed at 12/24/2022 5867  Gross per 24 hour   Intake 560 ml   Output 2300 ml   Net -1740 ml       Physical Exam:  General:  alert, cooperative, no distress, appears stated age  Neck:  no JVD  Lungs:  clear to auscultation bilaterally  Heart:  regular rate and rhythm  Abdomen:  no guarding or rigidity  Extremities:  no edema    Data Review:     Labs: Results:       Chemistry Recent Labs     12/22/22  1058   *      K 3.4*      CO2 25   BUN 72*   CREA 4.36*   CA 9.3   PHOS 3.4   AGAP 10   BUCR 17   ALB 2.5*      CBC w/Diff No results for input(s): WBC, RBC, HGB, HCT, PLT, GRANS, LYMPH, EOS, HGBEXT, HCTEXT, PLTEXT in the last 72 hours. Cardiac Enzymes No results found for: CPK, CK, CKMMB, CKMB, RCK3, CKMBT, CKNDX, CKND1, JESSA, TROPT, TROIQ, ISAI, TROPT, TNIPOC, BNP, BNPP   Coagulation No results for input(s): PTP, INR, APTT, INREXT in the last 72 hours.     Lipid Panel Lab Results   Component Value Date/Time    Cholesterol, total 206 (H) 05/19/2010 09:29 AM    HDL Cholesterol 49 05/19/2010 09:29 AM    LDL, calculated 147.4 (H) 05/19/2010 09:29 AM    VLDL, calculated 9.6 05/19/2010 09:29 AM    Triglyceride 48 05/19/2010 09:29 AM    CHOL/HDL Ratio 4.2 05/19/2010 09:29 AM      BNP No results found for: BNP, BNPP, XBNPT   Liver Enzymes Recent Labs     12/22/22  1058   ALB 2.5*      Digoxin    Thyroid Studies No results found for: T4, T3U, TSH, TSHEXT

## 2023-01-09 NOTE — DISCHARGE SUMMARY
Discharge Summary    Patient: Patricia Bradley MRN: [id number]  CSN: [id number]    YOB: 1940  Age: 80 y.o. Sex: male    DOA: 12/19/2022 LOS:  LOS: 5 days   Discharge Date: 12/24/2022     Admission Diagnoses: Acute respiratory failure with hypoxia ([facility] [address] [J96.01]  Acute kidney injury superimposed on chronic kidney disease ([facility] [address] [N17.9, N18.9]  Acute decompensated heart failure ([facility] [address] [I50.9]    Discharge Diagnoses:      Discharge Condition: Stable  Acute respiratory failure with hypoxia  Acute systolic CHF EF 01%  MADHU on CKD 4  Pulmonary hypertension  Anemia of chronic disease  Secondary hyperparathyroidism  Gout  Advanced age     PHYSICAL EXAM  Visit Vitals  /74   Pulse 74   Temp 98.8 °F (37.1 °C)   Resp 19   Ht 5' 4\" (1.626 m)   Wt 63 kg (138 lb 14.2 oz)   SpO2 98%   BMI 23.84 kg/m²       General: In NAD. Nontoxic-appearing. HEENT: NCAT. Sclerae anicteric, EOMI. OP clear. Lungs:  Clear, no wheezes. No accessory muscle use. Heart:  RRR. Abdomen: Soft, NT/ND. Extremities: Warm, no edema or ischemia. Psych:   Mood normal.  Neurologic:  Awake and alert, moves extremities spontaneously. Motor grossly nonfocal.    Hospital Course:   See admission H&P for further details of HPI. Patient was referred for hospital admission presented to [facility] emergency department for evaluation of worsening shortness of breath. He was found to be hypoxic on arrival to the ED and required initiation of BiPAP. BNP was markedly elevated and found to be in the 44,000 range. He was given IV diuretic therapy and ultimately responded well to this treatment. Cardiology and nephrology evaluations were obtained. IV diuresis was continued. Given worsening renal failure, hemodialysis was initiated. Patient has continued to improve and has been able to be weaned completely from oxygen as volume status has normalized.   Outpatient hemodialysis has been arranged and patient is felt to be medically stable for discharge home with outpatient follow-up as advised. Consults:   Nephrology  Cardiology    Significant Diagnostic Studies/Procedures:   2D echo: Interpretation Summary    Result status: Final result       Left Ventricle: Moderately reduced left ventricular systolic function with a visually estimated EF of 40%. Ejection fraction is 37% by Navarro's Biplane Method. Left ventricle size is normal. LVIDd is 4.6 cm. Findings consistent with mild concentric hypertrophy. Right Ventricle: Right ventricle is moderately dilated. Left Atrium: Left atrium is severely dilated. LA Vol Index A/L is 86 mL/m2. Right Atrium: Right atrium is dilated. Aortic Valve: Severely calcified cusps with reduced excursion. Moderate annular calcification. Mild to moderate regurgitation. Severe stenosis of the aortic valve. AV mean gradient is 51 mmHg. AV peak gradient is 92 mmHg. AV peak velocity is 4.8 m/s. AV area by continuity VTI is 0.7 cm2. Mitral Valve: Mildly thickened leaflets. Mild annular calcification. Mild to moderate regurgitation. Pulmonic Valve: Mild to moderate regurgitation. Tricuspid Valve: Moderate regurgitation. Severely elevated RVSP. The estimated RVSP is 87 mmHg. Pulmonary Arteries: Severe pulmonary hypertension present. Aorta: Normal sized ascending aorta. Dilated aortic root. Ao Root diameter is 3.8 cm. Ao Ascending diameter is 3.3 cm. Descending aorta is not well-visualized. Pericardium: Left pleural effusion. Comparison Study Information    Prior Study    There is a prior study available for comparison. Prior study date: 3/18/2011. As compared to the previous study, there are significant changes. LV function has decreased. RV size increased. EF was 55%; Normal RV size & function; Mild MR & TR; PAP of 30 mmHg. Severe aortic stenosis is now present         CT chest:  COMPARISON:       - No CT chest previously. - Chest x-ray 12/19/22, 03/11/20.  - CT abdomen-pelvis 08/11/22. TECHNIQUE:  Helically scanned volumetric axial sections of the chest are  obtained without IV contrast administration. Coronal and sagittal multiplanar  reconstruction images are generated for improved anatomic delineation. Radiation dose optimization techniques are utilized as appropriate to the exam,  with combination of automated exposure control, adjustment of the mA and/or kV  according to patient's size (including appropriate matching for site-specific  examinations), or use of iterative reconstruction technique. FINDINGS:      Lungs, Airways, Pleura:     - Mild to moderate pleural effusion bilaterally with associated atelectatic  changes particularly in the posterior aspects of both lower lobes. - Some interlobular septal thickening, most pronounced in the lung apices and in  the right middle lobe and lingula regions. - Slight groundglass densities in the central upper lobes and in the superior  segments of both lower lobes. - No dominant mass or discrete suspicious lung nodule. - Some emphysematous changes bilaterally. - Slight peribronchial thickening. Cardiovascular:  Pronounced calcific densities at the aortic valve. Moderate to pronounced atherosclerotic calcifications of the coronary arteries. No significant dilation of the ascending aorta. Moderate atherosclerotic  calcifications of the aorta. Mediastinum:  Slight janelle prominence. The most prominent of the mediastinal  nodes is identified in the precarinal region measuring about 2.2 x 1.5 cm (axial  #25). Base of Neck, Chest Wall, Axillae:  No adenopathy. Esophagus:  Unremarkable. Upper Abdomen:  3.2 x 3.0 cm cyst at the left kidney upper pole (axial #55). Exophytic structure in the right kidney incompletely visualized on current study  measuring 1.3 x 1.2 cm (axial #62). There may be another hypodensity more  medially, measuring about 1.1 x 0.9 cm (axial #61).      Bones:  No lytic lesions are acute bony abnormalities. IMPRESSION             1.  Bilateral pleural effusion with associated atelectatic changes. 2.  Aortic valve with pronounced calcifications. 3.  Some interlobular septal thickening and slight ground glass densities. In  light of the aortic valve calcifications, early manifestations of congestive  heart failure may be suspected. 4.  Renal hypodensities, favor renal cysts. 5.  Nonspecific slight precarinal janelle prominence. CXR:  COMPARISON:  12/19/22      TECHNIQUE:  Portable AP chest study     FINDINGS:      - Right IJ permacath placement. - Mild prominence of the cardiac silhouette with retrocardiac opacification.  - Bilateral pulmonary edema with central predominance and peripheral septal line  thickening.  - No pneumothorax. IMPRESSION     1. New right IJ dual-lumen catheter. No pneumothorax. 2.  Increased pulmonary edema pattern compared to 12/19/22. Potentially  cardiogenic vs. nonspecific increased intravascular fluid volume. Discharge Medications:     Discharge Medication List as of 12/24/2022  4:26 PM        START taking these medications    Details   bumetanide (BUMEX) 2 mg tablet Take 1 Tablet by mouth two (2) times a day., Normal, Disp-60 Tablet, R-0           CONTINUE these medications which have NOT CHANGED    Details   nitroglycerin (NITROSTAT) 0.4 mg SL tablet Historical Med      clopidogreL (PLAVIX) 75 mg tab Historical Med      allopurinoL (ZYLOPRIM) 100 mg tablet Take  by mouth daily. , Historical Med      atorvastatin (LIPITOR) 40 mg tablet Take  by mouth daily. , Historical Med      carvediloL (COREG) 25 mg tablet Take 25 mg by mouth two (2) times daily (with meals). , Historical Med           STOP taking these medications       predniSONE (STERAPRED DS) 10 mg dose pack Comments:   Reason for Stopping:         ondansetron hcl (Zofran) 4 mg tablet Comments:   Reason for Stopping:         ferrous sulfate (IRON) 325 mg (65 mg iron) EC tablet Comments:   Reason for Stopping:         amLODIPine (NORVASC) 10 mg tablet Comments:   Reason for Stopping:         doxazosin (CARDURA) 4 mg tablet Comments:   Reason for Stopping:         furosemide (LASIX) 20 mg tablet Comments:   Reason for Stopping:         hydrALAZINE (APRESOLINE) 50 mg tablet Comments:   Reason for Stopping:         loratadine (CLARITIN) 10 mg tablet Comments:   Reason for Stopping:         acetaminophen (TYLENOL) 325 mg tablet Comments:   Reason for Stopping:               Activity: As tolerated. Diet: Cardiac/renal.    Disposition: Home with home health care services. Follow-up: with PCP in 1 week. Bradnee Felix MD  [address] [place], [place] [address]ists    Disclaimer: Sections of this note are dictated using utilizing voice recognition software. Minor typographical errors may be present. If questions arise, please do not hesitate to contact me or call our department.

## 2023-03-03 ENCOUNTER — ANESTHESIA EVENT (OUTPATIENT)
Dept: CARDIOTHORACIC SURGERY | Facility: HOSPITAL | Age: 83
End: 2023-03-03
Payer: MEDICARE

## 2023-03-06 ENCOUNTER — HOSPITAL ENCOUNTER (OUTPATIENT)
Facility: HOSPITAL | Age: 83
Setting detail: OUTPATIENT SURGERY
Discharge: HOME | End: 2023-03-06
Attending: SURGERY | Admitting: SURGERY
Payer: MEDICARE

## 2023-03-06 ENCOUNTER — ANESTHESIA (OUTPATIENT)
Dept: CARDIOTHORACIC SURGERY | Facility: HOSPITAL | Age: 83
End: 2023-03-06
Payer: MEDICARE

## 2023-03-06 VITALS
OXYGEN SATURATION: 98 % | RESPIRATION RATE: 21 BRPM | DIASTOLIC BLOOD PRESSURE: 56 MMHG | WEIGHT: 128 LBS | BODY MASS INDEX: 21.97 KG/M2 | HEART RATE: 63 BPM | TEMPERATURE: 96.8 F | SYSTOLIC BLOOD PRESSURE: 139 MMHG

## 2023-03-06 DIAGNOSIS — N18.6 ESRD (END STAGE RENAL DISEASE) ON DIALYSIS (HCC): Primary | Chronic | ICD-10-CM

## 2023-03-06 DIAGNOSIS — Z99.2 ESRD (END STAGE RENAL DISEASE) ON DIALYSIS (HCC): Primary | Chronic | ICD-10-CM

## 2023-03-06 LAB
ANION GAP SERPL CALC-SCNC: 5 MMOL/L (ref 3–18)
BUN SERPL-MCNC: 55 MG/DL (ref 7–18)
BUN/CREAT SERPL: 9 (ref 12–20)
CALCIUM SERPL-MCNC: 9.3 MG/DL (ref 8.5–10.1)
CHLORIDE SERPL-SCNC: 107 MMOL/L (ref 100–111)
CO2 SERPL-SCNC: 26 MMOL/L (ref 21–32)
CREAT SERPL-MCNC: 5.79 MG/DL (ref 0.6–1.3)
GLUCOSE SERPL-MCNC: 134 MG/DL (ref 74–99)
POTASSIUM SERPL-SCNC: 5.6 MMOL/L (ref 3.5–5.5)
SODIUM SERPL-SCNC: 138 MMOL/L (ref 136–145)

## 2023-03-06 PROCEDURE — 7100000010 HC PHASE II RECOVERY - FIRST 15 MIN: Performed by: SURGERY

## 2023-03-06 PROCEDURE — 3700000001 HC ADD 15 MINUTES (ANESTHESIA): Performed by: SURGERY

## 2023-03-06 PROCEDURE — 3600000002 HC SURGERY LEVEL 2 BASE: Performed by: SURGERY

## 2023-03-06 PROCEDURE — 3600000012 HC SURGERY LEVEL 2 ADDTL 15MIN: Performed by: SURGERY

## 2023-03-06 PROCEDURE — 80048 BASIC METABOLIC PNL TOTAL CA: CPT

## 2023-03-06 PROCEDURE — 2580000003 HC RX 258: Performed by: SURGERY

## 2023-03-06 PROCEDURE — 99100 ANES PT EXTEME AGE<1 YR&>70: CPT | Performed by: ANESTHESIOLOGY

## 2023-03-06 PROCEDURE — 2500000003 HC RX 250 WO HCPCS: Performed by: SURGERY

## 2023-03-06 PROCEDURE — C1768 GRAFT, VASCULAR: HCPCS | Performed by: SURGERY

## 2023-03-06 PROCEDURE — 01844 ANES VASC SHUNT/SHUNT REVJ: CPT | Performed by: ANESTHESIOLOGY

## 2023-03-06 PROCEDURE — 3700000000 HC ANESTHESIA ATTENDED CARE: Performed by: SURGERY

## 2023-03-06 PROCEDURE — 6360000002 HC RX W HCPCS: Performed by: SURGERY

## 2023-03-06 PROCEDURE — 2580000003 HC RX 258: Performed by: NURSE ANESTHETIST, CERTIFIED REGISTERED

## 2023-03-06 PROCEDURE — 7100000000 HC PACU RECOVERY - FIRST 15 MIN: Performed by: SURGERY

## 2023-03-06 PROCEDURE — 6370000000 HC RX 637 (ALT 250 FOR IP): Performed by: SURGERY

## 2023-03-06 PROCEDURE — 2709999900 HC NON-CHARGEABLE SUPPLY: Performed by: SURGERY

## 2023-03-06 PROCEDURE — 7100000001 HC PACU RECOVERY - ADDTL 15 MIN: Performed by: SURGERY

## 2023-03-06 PROCEDURE — 6360000002 HC RX W HCPCS: Performed by: NURSE ANESTHETIST, CERTIFIED REGISTERED

## 2023-03-06 PROCEDURE — 2500000003 HC RX 250 WO HCPCS: Performed by: NURSE ANESTHETIST, CERTIFIED REGISTERED

## 2023-03-06 PROCEDURE — 7100000011 HC PHASE II RECOVERY - ADDTL 15 MIN: Performed by: SURGERY

## 2023-03-06 DEVICE — PROPATEN VASCULAR GRAFT SW 4-7MMX45CM TAPERED HEPARIN
Type: IMPLANTABLE DEVICE | Site: ARM | Status: FUNCTIONAL
Brand: GORE PROPATEN VASCULAR GRAFT

## 2023-03-06 RX ORDER — LIDOCAINE HYDROCHLORIDE 10 MG/ML
1 INJECTION, SOLUTION EPIDURAL; INFILTRATION; INTRACAUDAL; PERINEURAL
Status: DISCONTINUED | OUTPATIENT
Start: 2023-03-06 | End: 2023-03-06 | Stop reason: HOSPADM

## 2023-03-06 RX ORDER — LIDOCAINE HYDROCHLORIDE 20 MG/ML
INJECTION, SOLUTION EPIDURAL; INFILTRATION; INTRACAUDAL; PERINEURAL PRN
Status: DISCONTINUED | OUTPATIENT
Start: 2023-03-06 | End: 2023-03-06 | Stop reason: SDUPTHER

## 2023-03-06 RX ORDER — HYDROCODONE BITARTRATE AND ACETAMINOPHEN 5; 325 MG/1; MG/1
1 TABLET ORAL EVERY 4 HOURS PRN
Qty: 30 TABLET | Refills: 0 | Status: SHIPPED | OUTPATIENT
Start: 2023-03-06 | End: 2023-03-11

## 2023-03-06 RX ORDER — ONDANSETRON 2 MG/ML
4 INJECTION INTRAMUSCULAR; INTRAVENOUS
Status: DISCONTINUED | OUTPATIENT
Start: 2023-03-06 | End: 2023-03-06 | Stop reason: HOSPADM

## 2023-03-06 RX ORDER — DIPHENHYDRAMINE HYDROCHLORIDE 50 MG/ML
12.5 INJECTION INTRAMUSCULAR; INTRAVENOUS
Status: DISCONTINUED | OUTPATIENT
Start: 2023-03-06 | End: 2023-03-06 | Stop reason: HOSPADM

## 2023-03-06 RX ORDER — CEFAZOLIN SODIUM 1 G/3ML
INJECTION, POWDER, FOR SOLUTION INTRAMUSCULAR; INTRAVENOUS
Status: DISCONTINUED
Start: 2023-03-06 | End: 2023-03-06 | Stop reason: HOSPADM

## 2023-03-06 RX ORDER — SODIUM CHLORIDE 0.9 % (FLUSH) 0.9 %
5-40 SYRINGE (ML) INJECTION PRN
Status: DISCONTINUED | OUTPATIENT
Start: 2023-03-06 | End: 2023-03-06 | Stop reason: HOSPADM

## 2023-03-06 RX ORDER — HEPARIN SODIUM 200 [USP'U]/100ML
INJECTION, SOLUTION INTRAVENOUS CONTINUOUS PRN
Status: DISCONTINUED | OUTPATIENT
Start: 2023-03-06 | End: 2023-03-06 | Stop reason: HOSPADM

## 2023-03-06 RX ORDER — PROPOFOL 10 MG/ML
INJECTION, EMULSION INTRAVENOUS PRN
Status: DISCONTINUED | OUTPATIENT
Start: 2023-03-06 | End: 2023-03-06

## 2023-03-06 RX ORDER — LIDOCAINE HYDROCHLORIDE 10 MG/ML
INJECTION, SOLUTION EPIDURAL; INFILTRATION; INTRACAUDAL; PERINEURAL PRN
Status: DISCONTINUED | OUTPATIENT
Start: 2023-03-06 | End: 2023-03-06 | Stop reason: HOSPADM

## 2023-03-06 RX ORDER — SODIUM CHLORIDE 9 MG/ML
INJECTION, SOLUTION INTRAVENOUS PRN
Status: DISCONTINUED | OUTPATIENT
Start: 2023-03-06 | End: 2023-03-06 | Stop reason: HOSPADM

## 2023-03-06 RX ORDER — PROPOFOL 10 MG/ML
INJECTION, EMULSION INTRAVENOUS CONTINUOUS PRN
Status: DISCONTINUED | OUTPATIENT
Start: 2023-03-06 | End: 2023-03-06 | Stop reason: SDUPTHER

## 2023-03-06 RX ORDER — FAMOTIDINE 20 MG/1
20 TABLET, FILM COATED ORAL ONCE
Status: DISCONTINUED | OUTPATIENT
Start: 2023-03-06 | End: 2023-03-06 | Stop reason: HOSPADM

## 2023-03-06 RX ORDER — FENTANYL CITRATE 50 UG/ML
INJECTION, SOLUTION INTRAMUSCULAR; INTRAVENOUS PRN
Status: DISCONTINUED | OUTPATIENT
Start: 2023-03-06 | End: 2023-03-06 | Stop reason: SDUPTHER

## 2023-03-06 RX ORDER — SODIUM CHLORIDE 0.9 % (FLUSH) 0.9 %
5-40 SYRINGE (ML) INJECTION EVERY 12 HOURS SCHEDULED
Status: DISCONTINUED | OUTPATIENT
Start: 2023-03-06 | End: 2023-03-06 | Stop reason: HOSPADM

## 2023-03-06 RX ORDER — FENTANYL CITRATE 50 UG/ML
50 INJECTION, SOLUTION INTRAMUSCULAR; INTRAVENOUS EVERY 5 MIN PRN
Status: DISCONTINUED | OUTPATIENT
Start: 2023-03-06 | End: 2023-03-06 | Stop reason: HOSPADM

## 2023-03-06 RX ORDER — HEPARIN SODIUM 200 [USP'U]/100ML
INJECTION, SOLUTION INTRAVENOUS
Status: DISCONTINUED
Start: 2023-03-06 | End: 2023-03-06 | Stop reason: HOSPADM

## 2023-03-06 RX ORDER — HYDROCODONE BITARTRATE AND ACETAMINOPHEN 5; 325 MG/1; MG/1
1 TABLET ORAL ONCE AS NEEDED
Status: COMPLETED | OUTPATIENT
Start: 2023-03-06 | End: 2023-03-06

## 2023-03-06 RX ORDER — HEPARIN SODIUM 1000 [USP'U]/ML
INJECTION, SOLUTION INTRAVENOUS; SUBCUTANEOUS PRN
Status: DISCONTINUED | OUTPATIENT
Start: 2023-03-06 | End: 2023-03-06 | Stop reason: SDUPTHER

## 2023-03-06 RX ADMIN — FENTANYL CITRATE 25 MCG: 50 INJECTION, SOLUTION INTRAMUSCULAR; INTRAVENOUS at 15:07

## 2023-03-06 RX ADMIN — FENTANYL CITRATE 25 MCG: 50 INJECTION, SOLUTION INTRAMUSCULAR; INTRAVENOUS at 15:02

## 2023-03-06 RX ADMIN — HYDROCODONE BITARTRATE AND ACETAMINOPHEN 1 TABLET: 5; 325 TABLET ORAL at 18:30

## 2023-03-06 RX ADMIN — LIDOCAINE HYDROCHLORIDE 40 MG: 20 INJECTION, SOLUTION EPIDURAL; INFILTRATION; INTRACAUDAL; PERINEURAL at 15:06

## 2023-03-06 RX ADMIN — PROPOFOL 20 MG: 10 INJECTION, EMULSION INTRAVENOUS at 15:18

## 2023-03-06 RX ADMIN — SODIUM CHLORIDE: 900 INJECTION, SOLUTION INTRAVENOUS at 15:02

## 2023-03-06 RX ADMIN — WATER 2000 MG: 1 INJECTION INTRAMUSCULAR; INTRAVENOUS; SUBCUTANEOUS at 15:15

## 2023-03-06 RX ADMIN — FENTANYL CITRATE 25 MCG: 50 INJECTION, SOLUTION INTRAMUSCULAR; INTRAVENOUS at 15:17

## 2023-03-06 RX ADMIN — FENTANYL CITRATE 25 MCG: 50 INJECTION, SOLUTION INTRAMUSCULAR; INTRAVENOUS at 15:27

## 2023-03-06 RX ADMIN — PROPOFOL 75 MCG/KG/MIN: 10 INJECTION, EMULSION INTRAVENOUS at 15:07

## 2023-03-06 RX ADMIN — HEPARIN SODIUM 3000 UNITS: 1000 INJECTION, SOLUTION INTRAVENOUS; SUBCUTANEOUS at 15:27

## 2023-03-06 ASSESSMENT — PAIN DESCRIPTION - LOCATION: LOCATION: ARM;INCISION

## 2023-03-06 ASSESSMENT — PAIN SCALES - GENERAL
PAINLEVEL_OUTOF10: 5
PAINLEVEL_OUTOF10: 0

## 2023-03-06 ASSESSMENT — PAIN DESCRIPTION - DESCRIPTORS: DESCRIPTORS: ACHING

## 2023-03-06 ASSESSMENT — PAIN DESCRIPTION - ORIENTATION: ORIENTATION: LEFT;UPPER

## 2023-03-06 ASSESSMENT — PAIN - FUNCTIONAL ASSESSMENT: PAIN_FUNCTIONAL_ASSESSMENT: 0-10

## 2023-03-06 NOTE — H&P
Surgery History and Physical    Subjective:      Basim Hemphill is a 80 y.o. male who presents with need for permanent dialysis access. Currently using a tunneled catheter. Patient Active Problem List    Diagnosis Date Noted    ESRD (end stage renal disease) on dialysis ([facility] [address] 2023    Acute respiratory failure with hypoxia ([facility] [address] 2022    Acute decompensated heart failure ([facility] [address] 2022    Acute kidney injury superimposed on chronic kidney disease ([facility] [address] 2022    Inguinal hernia of right side without obstruction or gangrene 2020    Special screening for malignant neoplasms, colon 2014     Past Medical History:   Diagnosis Date    Arthritis     Chronic constipation     Gout     High cholesterol     Hypertension       Past Surgical History:   Procedure Laterality Date    HEENT      tonsillectomy cyst removed from under tongue    HERNIA REPAIR      Robot-assisted laparoscopic right inguinal hernia      Social History     Tobacco Use    Smoking status: Former     Types: Cigarettes     Quit date: 3/11/1995     Years since quittin.0    Smokeless tobacco: Former     Quit date: 3/11/1995   Substance Use Topics    Alcohol use: No      No family history on file. Prior to Admission medications    Medication Sig Start Date End Date Taking?  Authorizing Provider   allopurinol (ZYLOPRIM) 100 MG tablet Take by mouth daily    Ar Automatic Reconciliation   atorvastatin (LIPITOR) 40 MG tablet Take by mouth daily    Ar Automatic Reconciliation   bumetanide (BUMEX) 2 MG tablet Take 2 mg by mouth 2 times daily 22   Ar Automatic Reconciliation   carvedilol (COREG) 25 MG tablet Take 25 mg by mouth 2 times daily (with meals)    Ar Automatic Reconciliation   clopidogrel (PLAVIX) 75 MG tablet ceived the following from [facility] [address] [place], [place] [id number]: Outside name: clopidogreL (PLAVIX) 75 mg tab 21   Ar Automatic Reconciliation   nitroGLYCERIN (NITROSTAT) 0.4 MG SL tablet ceived the following from [facility]: Outside name: nitroglycerin (NITROSTAT) 0.4 mg SL tablet 2/18/22   Ar Automatic Reconciliation     Not on File      Review of Systems:    A comprehensive review of systems was negative except for that written in the History of Present Illness. Objective:     No data found. No data recorded. Physical Exam:  Affect is pleasant no distress  Head is normocephalic  Neck no JVD  Chest is clear  Cardiac is regular  Abdomen soft  Extremities without signs of ischemia      Labs: No results found for this or any previous visit (from the past 24 hour(s)). Data Review: Labs reviewed    Assessment:     Principal Problem:    ESRD (end stage renal disease) on dialysis [facility])  Resolved Problems:    * No resolved hospital problems.  *      Plan:     Left arm arteriovenous graft creation    Signed By: Nancy Taveras MD     March 6, 2023

## 2023-03-06 NOTE — ANESTHESIA POSTPROCEDURE EVALUATION
Department of Anesthesiology  Postprocedure Note    Patient: Barry Zhang  MRN: [id number]  YOB: 1940  Date of evaluation: 3/6/2023      Procedure Summary     Date: 03/06/23 Room / Location: [facility] CVT 02 / [facility] CARDIAC SURGERY    Anesthesia Start: 1502 Anesthesia Stop: 200    Procedure: LEFT ARM ARTERIO VENOUS GRAFT CREATION (Left: Arm Upper) Diagnosis:       ESRD (end stage renal disease) ([facility] [address]      (ESRD (end stage renal disease) ([facility] [address] [N18.6])    Surgeons: Shadia Akers MD Responsible Provider: Wilver Andre MD    Anesthesia Type: MAC ASA Status: 3          Anesthesia Type: MAC    Treva Phase I: Treva Score: 6    Treva Phase II:        Anesthesia Post Evaluation    Patient location during evaluation: bedside  Airway patency: patent  Complications: no  Cardiovascular status: hemodynamically stable  Respiratory status: acceptable  Hydration status: stable

## 2023-03-06 NOTE — ANESTHESIA PRE PROCEDURE
Department of Anesthesiology  Preprocedure Note       Name:  Tamia Sawant   Age:  80 y.o.  :  1940                                          MRN:  [id number]         Date:  3/6/2023      Surgeon: Hawk Velasco):  Albino Stubbs MD    Procedure: Procedure(s):  LEFT ARM ARTERIO VENOUS GRAFT CREATION    Medications prior to admission:   Prior to Admission medications    Medication Sig Start Date End Date Taking?  Authorizing Provider   allopurinol (ZYLOPRIM) 100 MG tablet Take by mouth daily    Ar Automatic Reconciliation   atorvastatin (LIPITOR) 40 MG tablet Take by mouth daily    Ar Automatic Reconciliation   bumetanide (BUMEX) 2 MG tablet Take 2 mg by mouth 2 times daily 22   Ar Automatic Reconciliation   carvedilol (COREG) 25 MG tablet Take 25 mg by mouth 2 times daily (with meals)    Ar Automatic Reconciliation   clopidogrel (PLAVIX) 75 MG tablet ceived the following from [place] [address] - [facility]: Outside name: clopidogreL (PLAVIX) 75 mg tab 21   Ar Automatic Reconciliation   nitroGLYCERIN (NITROSTAT) 0.4 MG SL tablet ceived the following from [place] [address] - [facility]: Outside name: nitroglycerin (NITROSTAT) 0.4 mg SL tablet 22   Ar Automatic Reconciliation       Current medications:    Current Facility-Administered Medications   Medication Dose Route Frequency Provider Last Rate Last Admin    sodium chloride flush 0.9 % injection 5-40 mL  5-40 mL IntraVENous 2 times per day Benson Pastures, APRN - CRNA        sodium chloride flush 0.9 % injection 5-40 mL  5-40 mL IntraVENous PRN Benson Pastures, APRN - CRNA        0.9 % sodium chloride infusion   IntraVENous PRN Benson Pastures, APRN - CRNA        lidocaine PF 1 % injection 1 mL  1 mL IntraDERmal Once PRN Benson Pastures, APRN - CRNA        famotidine (PEPCID) tablet 20 mg  20 mg Oral Once Benson Pastures, APRN - CRNA        ceFAZolin (ANCEF) 2,000 mg in sterile water 20 mL IV syringe  2,000 mg IntraVENous Once Albino Stubbs MD        Heparin (Porcine) 1000-0.9 UT/500ML-% infusion             lidocaine 1 % injection                Allergies:  No Known Allergies    Problem List:    Patient Active Problem List   Diagnosis Code    Special screening for malignant neoplasms, colon Z12.11    Inguinal hernia of right side without obstruction or gangrene K40.90    Acute respiratory failure with hypoxia (HCC) J96.01    Acute decompensated heart failure (HCC) I50.9    Acute kidney injury superimposed on chronic kidney disease ([facility] [address] N17.9, N18.9    ESRD (end stage renal disease) on dialysis ([facility] [address] N18.6, Z99.2       Past Medical History:        Diagnosis Date    Arthritis     Chronic constipation     Gout     High cholesterol     Hypertension        Past Surgical History:        Procedure Laterality Date    HEENT      tonsillectomy cyst removed from under tongue    HERNIA REPAIR      Robot-assisted laparoscopic right inguinal hernia       Social History:    Social History     Tobacco Use    Smoking status: Former     Types: Cigarettes     Quit date: 3/11/1995     Years since quittin.0    Smokeless tobacco: Former     Quit date: 3/11/1995   Substance Use Topics    Alcohol use:  No                                Counseling given: Not Answered      Vital Signs (Current):   Vitals:    23 1110   BP: (!) 172/66   Pulse: 59   Resp: 20   Temp: 97.8 °F (36.6 °C)   TempSrc: Oral   SpO2: 99%   Weight: 128 lb (58.1 kg)                                              BP Readings from Last 3 Encounters:   23 (!) 172/66       NPO Status: Time of last liquid consumption: 1800                        Time of last solid consumption: 1800                        Date of last liquid consumption: 23                        Date of last solid food consumption: 23    BMI:   Wt Readings from Last 3 Encounters:   23 128 lb (58.1 kg)   22 133 lb (60.3 kg)   22 131 lb (59.4 kg)     Body mass index is 21.97 kg/m².    CBC:   Lab Results   Component Value Date/Time    WBC 7.3 12/21/2022 05:48 AM    RBC 2.94 12/21/2022 05:48 AM    HGB 8.4 12/21/2022 05:48 AM    HCT 26.9 12/21/2022 05:48 AM    MCV 91.5 12/21/2022 05:48 AM    RDW 19.8 12/21/2022 05:48 AM     12/21/2022 05:48 AM       CMP:   Lab Results   Component Value Date/Time     03/06/2023 11:10 AM    K 5.6 03/06/2023 11:10 AM     03/06/2023 11:10 AM    CO2 26 03/06/2023 11:10 AM    BUN 55 03/06/2023 11:10 AM    CREATININE 5.79 03/06/2023 11:10 AM    GFRAA 17 09/16/2022 08:48 AM    AGRATIO 0.8 12/19/2022 07:08 AM    LABGLOM 9 03/06/2023 11:10 AM    GLUCOSE 134 03/06/2023 11:10 AM    PROT 6.7 12/19/2022 07:08 AM    CALCIUM 9.3 03/06/2023 11:10 AM    BILITOT 0.7 12/19/2022 07:08 AM    ALKPHOS 91 12/19/2022 07:08 AM     12/19/2022 07:08 AM     12/19/2022 07:08 AM       POC Tests: No results for input(s): POCGLU, POCNA, POCK, POCCL, POCBUN, POCHEMO, POCHCT in the last 72 hours.     Coags:   Lab Results   Component Value Date/Time    PROTIME 14.4 06/05/2022 12:00 PM    INR 1.1 06/05/2022 12:00 PM    APTT 32.9 06/05/2022 12:00 PM       HCG (If Applicable): No results found for: PREGTESTUR, PREGSERUM, HCG, HCGQUANT     ABGs:   Lab Results   Component Value Date/Time    PHART 7.54 12/21/2022 04:44 AM    PO2ART 44 12/21/2022 04:44 AM    SWP1DXZ 28.4 12/21/2022 04:44 AM    CJP2NZM 24.6 12/21/2022 04:44 AM    BEART 2.7 12/21/2022 04:44 AM        Type & Screen (If Applicable):  No results found for: LABABO, LABRH    Drug/Infectious Status (If Applicable):  No results found for: HIV, HEPCAB    COVID-19 Screening (If Applicable):   Lab Results   Component Value Date/Time    COVID19 Not detected 12/19/2022 07:17 AM           Anesthesia Evaluation  Patient summary reviewed  Airway: Mallampati: II  TM distance: >3 FB   Neck ROM: full  Mouth opening: > = 3 FB   Dental:          Pulmonary:                              Cardiovascular:    (+) hypertension: moderate, CHF:,       ECG reviewed      Echocardiogram reviewed                  Neuro/Psych:               GI/Hepatic/Renal:   (+) renal disease: ESRD,           Endo/Other:                     Abdominal:             Vascular: Other Findings:           Anesthesia Plan      MAC     ASA 3       Induction: intravenous. Anesthetic plan and risks discussed with patient. Plan discussed with CRNA.     Attending anesthesiologist reviewed and agrees with Candis Patel MD   3/6/2023

## 2023-03-06 NOTE — OP NOTE
Operative Note      Patient: David Weber  YOB: 1940  MRN: [id number]    Date of Procedure: 3/6/2023    Pre-Op Diagnosis: [facility] [N18.6]    Post-Op Diagnosis: Same       Procedure(s):  LEFT ARM ARTERIO VENOUS GRAFT CREATION    Surgeon(s):  Dayna Huang MD    Assistant:   Surgical Assistant: Don Solo    Anesthesia: Monitor Anesthesia Care    Estimated Blood Loss (mL): Minimal    Complications: None    Specimens:   * No specimens in log *    Implants:  Implant Name Type Inv. Item Serial No.  Lot No. LRB No. Used Action   GRAFT VASC L45CM DIA4-7MM PTFE CBAS HEP SURF STD WALLED - [id number] Vascular grafts GRAFT VASC L45CM DIA4-7MM PTFE CBAS HEP SURF STD WALLED [id number] [employer]  Left 1 Implanted         Drains: * No LDAs found *    Findings: Left arm brachial artery to axillary vein tapered propatent graft    Detailed Description of Procedure:   Patient prepped antibiotic's table spine position had moderate sedation the left arm was prepped draped usual standard fashion followed by CC compliant guidelines for use of technique. 1% lidocaine was used for local I did a direct cutdown exposing the axillary vein first is appropriate for access. Next the main incision antecubital fossa exposed the brachial artery is small soft and patent. Made a curvilinear tunnel between the 2 sites and placed a 4 to 7 mm tapered propatent graft. Anticoagulated then given the arterial controls first made arteriotomy limb by scalpel Mcelroy scissors cut the graft on a bevel and sewed end-to-side to the artery with CV 6 suture. I clamped the graft and released the controls there was a nice pulse in the artery. In the vein controls next made a venotomy cut the graft on a bevel and sewed it end-to-side to the vein with CV 6 suture. Once this is complete I released all the controls and there was good flow in the graft with a nice thrill. Irrigated the sites and closed the fascia with 3-0 Monocryl interrupted 4-0 Monocryl for the skin Dermabond glue for the skin is transferred to recovery in stable condition with no pain in the hand    Electronically signed by Blanca Lim MD on 3/6/2023 at 4:04 PM

## 2023-03-06 NOTE — DISCHARGE INSTRUCTIONS
Hemodialysis Access Surgery: What to Expect at [facility]  Hemodialysis is a way to remove wastes from the blood when your kidneys can no longer do the job. It's not a cure, but it can help you live longer and feel better. It's a lifesaving treatment when you have kidney failure. Hemodialysis is often called dialysis. Your doctor created a place (called an access) in your arm for your blood to flow in and out of your body during your dialysis sessions. Your arm will probably be bruised and swollen. It may hurt. The cut (incision) may bleed. The pain and bleeding will get better over several days. You will probably need only over-the-counter pain medicine. You can reduce swelling by propping up your arm on 1 or 2 pillows and keeping your elbow straight. You will have stitches. These may dissolve on their own, or your doctor will tell you when to come in to have them removed. You should also be able to return to work in a few days. You may feel some coolness or numbness in your hand. These feelings usually go away in a few weeks. Your doctor may suggest squeezing a soft object. This will strengthen your access and may make hemodialysis faster and easier. You should always be able to feel blood rushing through the fistula or graft. It feels like a slight vibration when you put your fingers on the skin over the fistula or graft. This feeling is called a thrill or a pulse. This care sheet gives you a general idea about how long it will take for you to recover. But each person recovers at a different pace. Follow the steps below to get better as quickly as possible. How can you care for yourself at home? Activity    Rest when you feel tired. Getting enough sleep will help you recover. Do not lie on or sleep on the arm with the access. Avoid activities such as washing windows or gardening that put stress on the arm with the access.      You may use your arm, but do not lift anything that weighs more than about 15 pounds. This may include a child, heavy grocery bags, a heavy briefcase or backpack, cat litter or dog food bags, or a vacuum . You can shower, but keep the access dry for the first 2 days. Cover the area with a plastic bag to keep it dry. Do not soak or scrub the incision until it has healed. Wear an arm guard to protect the area if you play sports or work with your arms. You may drive when your doctor says it is okay. This is usually in 1 to 2 days. Most people are able to return to work about 1 or 2 days after surgery. Diet    Follow an eating plan that is good for your kidneys. A registered dietitian can help you make a meal plan that is right for you. You may need to limit protein, salt, fluids, and certain foods. Medicines    Your doctor will tell you if and when you can restart your medicines. You will also be given instructions about taking any new medicines. If you stopped taking aspirin or some other blood thinner, your doctor will tell you when to start taking it again. Take pain medicines exactly as directed. If the doctor gave you a prescription medicine for pain, take it as prescribed. If you are not taking a prescription pain medicine, ask your doctor if you can take acetaminophen (Tylenol). Do not take ibuprofen (Advil, Motrin) or naproxen (Aleve), or similar medicines, unless your doctor tells you to. They may make chronic kidney disease worse. Do not take two or more pain medicines at the same time unless the doctor told you to. Many pain medicines have acetaminophen, which is Tylenol. Too much acetaminophen (Tylenol) can be harmful. If you think your pain medicine is making you sick to your stomach: Take your medicine after meals (unless your doctor has told you not to). Ask your doctor for a different pain medicine. If your doctor prescribed antibiotics, take them as directed. Do not stop taking them just because you feel better. You need to take the full course of antibiotics. Incision care    Keep the area dry for 2 days. After 2 days, wash the area with soap and water every day, and always before dialysis. Do not soak or scrub the incision until it has healed. If you have a bandage, change it every day or as your doctor recommends. Your doctor will tell you when you can remove it. Exercise    Squeeze a soft ball or other object as your doctor tells you. This will help blood flow through the access and help prevent blood clots. Elevation    Prop up the sore arm on a pillow anytime you sit or lie down during the next 3 days. Try to keep it above the level of your heart. This will help reduce swelling. Other instructions    Every day, check your access for a pulse or thrill in the fistula or graft area. A thrill is a vibration. To feel a pulse or thrill, place the first two fingers of your hand over the access. Do not bump your arm. Do not wear tight clothing, jewelry, or anything else that may squeeze the access. Use your other arm to have blood drawn or blood pressure taken. Do not put cream or lotion on or near the access. Make sure all doctors you deal with know that you have a vascular access. Follow-up care is a key part of your treatment and safety. Be sure to make and go to all appointments, and call your doctor if you are having problems. It's also a good idea to know your test results and keep a list of the medicines you take. When should you call for help? Call 911 anytime you think you may need emergency care. For example, call if:    You passed out (lost consciousness). You have chest pain, are short of breath, or cough up blood. Call your doctor now or seek immediate medical care if:    Your hand or arm is cold or dark-colored. You have no pulse in your access. You have nausea or you vomit for more than four hours.      You have pain that does not get better after you take pain medicine. You have loose stitches, or your incision comes open. You are bleeding from the incision. You have signs of infection, such as: Increased pain, swelling, warmth, or redness. Red streaks leading from the area. Pus draining from the area. A fever. You have signs of a blood clot in your leg (called a deep vein thrombosis), such as:  Pain in your calf, back of the knee, thigh, or groin. Redness or swelling in your leg. Watch closely for changes in your health, and be sure to contact your doctor if you have any problems. Where can you learn more? Go to http://www.woods.com/ and enter P616 to learn more about \"Hemodialysis Access Surgery: What to Expect at Home. \"  Current as of: May 4, 2022               Content Version: 13.5  © 2006-2022 Healthwise, [employer]. Care instructions adapted under license by [facility] ([facility]). If you have questions about a medical condition or this instruction, always ask your healthcare professional. [place] [address] any warranty or liability for your use of this information. Learning About Hemodialysis and Vascular Access Surgery  What are hemodialysis and vascular access? Before you can start dialysis, your doctor will need to create a vascular access. This is a place where the blood can flow in and out of your body during your dialysis sessions. Your doctor will prepare the vascular access weeks to months before dialysis starts. It's important to get your access as soon as your doctor says to. This allows your access to heal before you use it. For dialysis to work best, the access needs to have a good, steady blood flow. It also must be sturdy since it will be used often, usually 3 times every week. Hemodialysis is a way to remove wastes from the blood when your kidneys can no longer do the job. It's a lifesaving treatment when you have kidney failure. Hemodialysis is often called dialysis.   Learning about vascular access and dialysis can help you take an active role in your treatment. Dialysis doesn't cure kidney disease. But it can help you live longer and feel better. You will need to follow your diet and treatment schedule carefully. How is vascular access surgery done? The vascular access is where the needles are put that draw the blood from your body and send it through tubes to the dialysis machine. This access is also used to return the clean blood that is sent back into your body. There are two basic types of permanent vascular access:  AV fistula. To make a fistula, your doctor will connect an artery to a vein in your arm. After the fistula heals, the dialysis needles can be put into it. Fistulas tend to be stronger and less likely to get infected than grafts. But they need to be prepared at least several months ahead of time. AV graft. To make a graft, your doctor will put a tube under the skin in your arm. The tube, or graft, connects an artery and a vein. The dialysis needles can then be put into the graft for dialysis. A graft is a good choice if you have small veins or other problems. A graft can sometimes be used as soon as 1 to 3 weeks after placement. You will be asleep or get medicine to feel relaxed during the surgery. You will not feel pain. Your doctor will make a cut (incision) on the arm you use the least. If you are right-handed, the fistula or graft will probably be put in your left arm. If you are left-handed, it will probably be put in your right arm. Your doctor will close the incision with stitches. The incision will leave a scar that fades with time. If you need to start hemodialysis right away, your doctor may place a tube in a vein in your neck, chest, or arm. This is called a central vascular access device (CVAD). It can be used while your permanent access heals. CVADs have more problems than an AV fistula or AV graft, so they aren't the best choice for permanent access.   If you get an AV fistula, you will probably go home the same day as the surgery. If you get an AV graft, you may spend 1 night at the hospital. You will probably need to take 1 or 2 days off from work. What happens during hemodialysis? Hemodialysis uses a man-made membrane called a dialyzer to clean your blood. You are connected to the dialyzer by tubes. They are attached to your blood vessels through your vascular access. Blood flows from your body into the dialysis machine through one of the tubes. In the machine, your blood is filtered. When your blood is in the filter, dialysate solution also goes through the filter. This solution takes waste out of your blood. The used solution is pumped out of the machine. Your clean blood returns to your body through the other tube. How can you care for yourself at home? Be sure to have all of your dialysis sessions. Do not try to shorten or skip your sessions. You have a better chance of a longer and healthier life by getting your full treatment. Your doctor or health care team will show you the steps you need to go through each day before, during, and after dialysis. Be sure to follow these steps. If you do not understand a step, talk to your team.  Your doctor and dietitian will help you design menus that follow your diet. Be sure to follow your diet guidelines. You will need to limit fluids and certain foods that contain salt (sodium), potassium, and phosphorus. You may need higher levels of protein in your diet. Your doctor may recommend certain vitamins. But do not take any other medicine, including over-the-counter medicines, vitamins, and herbal products, without talking to your doctor first.  Julia Peoples not smoke. Smoking raises your risk of many health problems, including more kidney damage. If you need help quitting, talk to your doctor about stop-smoking programs and medicines. These can increase your chances of quitting for good.   Do not take ibuprofen (Advil, Motrin), naproxen (Aleve), or similar medicines, unless your doctor tells you to. These medicines may make kidney problems worse. Follow-up care is a key part of your treatment and safety. Be sure to make and go to all appointments, and call your doctor if you are having problems. It's also a good idea to know your test results and keep a list of the medicines you take. Where can you learn more? Go to http://www.woods.com/ and enter W544 to learn more about \"Learning About Hemodialysis and Vascular Access Surgery. \"  Current as of: May 4, 2022               Content Version: 13.5  © 3005-6271 [employer]. Care instructions adapted under license by [facility] ([facility]). If you have questions about a medical condition or this instruction, always ask your healthcare professional. [address] any warranty or liability for your use of this information. Hemodialysis Access Surgery: What to Expect at [facility]  Hemodialysis is a way to remove wastes from the blood when your kidneys can no longer do the job. It's not a cure, but it can help you live longer and feel better. It's a lifesaving treatment when you have kidney failure. Hemodialysis is often called dialysis. Your doctor created a place (called an access) in your arm for your blood to flow in and out of your body during your dialysis sessions. Your arm will probably be bruised and swollen. It may hurt. The cut (incision) may bleed. The pain and bleeding will get better over several days. You will probably need only over-the-counter pain medicine. You can reduce swelling by propping up your arm on 1 or 2 pillows and keeping your elbow straight. You will have stitches. These may dissolve on their own, or your doctor will tell you when to come in to have them removed. You should also be able to return to work in a few days. You may feel some coolness or numbness in your hand.  These feelings usually go away in a few weeks. Your doctor may suggest squeezing a soft object. This will strengthen your access and may make hemodialysis faster and easier. You should always be able to feel blood rushing through the fistula or graft. It feels like a slight vibration when you put your fingers on the skin over the fistula or graft. This feeling is called a thrill or a pulse. This care sheet gives you a general idea about how long it will take for you to recover. But each person recovers at a different pace. Follow the steps below to get better as quickly as possible. How can you care for yourself at home? Activity    Rest when you feel tired. Getting enough sleep will help you recover. Do not lie on or sleep on the arm with the access. Avoid activities such as washing windows or gardening that put stress on the arm with the access. You may use your arm, but do not lift anything that weighs more than about 15 pounds. This may include a child, heavy grocery bags, a heavy briefcase or backpack, cat litter or dog food bags, or a vacuum . You can shower, but keep the access dry for the first 2 days. Cover the area with a plastic bag to keep it dry. Do not soak or scrub the incision until it has healed. Wear an arm guard to protect the area if you play sports or work with your arms. You may drive when your doctor says it is okay. This is usually in 1 to 2 days. Most people are able to return to work about 1 or 2 days after surgery. Diet    Follow an eating plan that is good for your kidneys. A registered dietitian can help you make a meal plan that is right for you. You may need to limit protein, salt, fluids, and certain foods. Medicines    Your doctor will tell you if and when you can restart your medicines. You will also be given instructions about taking any new medicines.      If you stopped taking aspirin or some other blood thinner, your doctor will tell you when to start taking it again. Take pain medicines exactly as directed. If the doctor gave you a prescription medicine for pain, take it as prescribed. If you are not taking a prescription pain medicine, ask your doctor if you can take acetaminophen (Tylenol). Do not take ibuprofen (Advil, Motrin) or naproxen (Aleve), or similar medicines, unless your doctor tells you to. They may make chronic kidney disease worse. Do not take two or more pain medicines at the same time unless the doctor told you to. Many pain medicines have acetaminophen, which is Tylenol. Too much acetaminophen (Tylenol) can be harmful. If you think your pain medicine is making you sick to your stomach: Take your medicine after meals (unless your doctor has told you not to). Ask your doctor for a different pain medicine. If your doctor prescribed antibiotics, take them as directed. Do not stop taking them just because you feel better. You need to take the full course of antibiotics. Incision care    Keep the area dry for 2 days. After 2 days, wash the area with soap and water every day, and always before dialysis. Do not soak or scrub the incision until it has healed. If you have a bandage, change it every day or as your doctor recommends. Your doctor will tell you when you can remove it. Exercise    Squeeze a soft ball or other object as your doctor tells you. This will help blood flow through the access and help prevent blood clots. Elevation    Prop up the sore arm on a pillow anytime you sit or lie down during the next 3 days. Try to keep it above the level of your heart. This will help reduce swelling. Other instructions    Every day, check your access for a pulse or thrill in the fistula or graft area. A thrill is a vibration. To feel a pulse or thrill, place the first two fingers of your hand over the access. Do not bump your arm. Do not wear tight clothing, jewelry, or anything else that may squeeze the access.      Use your other arm to have blood drawn or blood pressure taken. Do not put cream or lotion on or near the access. Make sure all doctors you deal with know that you have a vascular access. Follow-up care is a key part of your treatment and safety. Be sure to make and go to all appointments, and call your doctor if you are having problems. It's also a good idea to know your test results and keep a list of the medicines you take. When should you call for help? Call 911 anytime you think you may need emergency care. For example, call if:    You passed out (lost consciousness). You have chest pain, are short of breath, or cough up blood. Call your doctor now or seek immediate medical care if:    Your hand or arm is cold or dark-colored. You have no pulse in your access. You have nausea or you vomit for more than four hours. You have pain that does not get better after you take pain medicine. You have loose stitches, or your incision comes open. You are bleeding from the incision. You have signs of infection, such as: Increased pain, swelling, warmth, or redness. Red streaks leading from the area. Pus draining from the area. A fever. You have signs of a blood clot in your leg (called a deep vein thrombosis), such as:  Pain in your calf, back of the knee, thigh, or groin. Redness or swelling in your leg. Watch closely for changes in your health, and be sure to contact your doctor if you have any problems. Where can you learn more? Go to http://www.woods.com/ and enter P616 to learn more about \"Hemodialysis Access Surgery: What to Expect at Home. \"  Current as of: May 4, 2022               Content Version: 13.5  © 6479-5430 [employer]. Care instructions adapted under license by [facility] ([facility]).  If you have questions about a medical condition or this instruction, always ask your healthcare professional. Ariana [place]ant any warranty or liability for your use of this information. DISCHARGE SUMMARY from Nurse    PATIENT INSTRUCTIONS:    After general anesthesia or intravenous sedation, for 24 hours or while taking prescription Narcotics:  Limit your activities  Do not drive and operate hazardous machinery  Do not make important personal or business decisions  Do  not drink alcoholic beverages  If you have not urinated within 8 hours after discharge, please contact your surgeon on call. Report the following to your surgeon:  Excessive pain, swelling, redness or odor of or around the surgical area  Temperature over 100.5  Nausea and vomiting lasting longer than 4 hours or if unable to take medications  Any signs of decreased circulation or nerve impairment to extremity: change in color, persistent  numbness, tingling, coldness or increase pain  Any questions    What to do at Home:  Recommended activity: {discharge activity:59011}, ***    If you experience any of the following symptoms ***, please follow up with ***. *  Please give a list of your current medications to your Primary Care Provider. *  Please update this list whenever your medications are discontinued, doses are      changed, or new medications (including over-the-counter products) are added. *  Please carry medication information at all times in case of emergency situations. These are general instructions for a healthy lifestyle:    No smoking/ No tobacco products/ Avoid exposure to second hand smoke  Surgeon General's Warning:  Quitting smoking now greatly reduces serious risk to your health.     Obesity, smoking, and sedentary lifestyle greatly increases your risk for illness    A healthy diet, regular physical exercise & weight monitoring are important for maintaining a healthy lifestyle    You may be retaining fluid if you have a history of heart failure or if you experience any of the following symptoms:  Weight gain of 3 pounds or more overnight or 5 pounds in a week, increased swelling in our hands or feet or shortness of breath while lying flat in bed. Please call your doctor as soon as you notice any of these symptoms; do not wait until your next office visit. The discharge information has been reviewed with the {PATIENT PARENT GUARDIAN:50601}. The {PATIENT PARENT GUARDIAN:94737} verbalized understanding. Discharge medications reviewed with the {Dishcarge meds reviewed XOGE:30831} and appropriate educational materials and side effects teaching were provided.   ___________________________________________________________________________________________________________________________________

## 2023-11-09 ENCOUNTER — TELEPHONE (OUTPATIENT)
Facility: HOSPITAL | Age: 83
End: 2023-11-09

## (undated) DEVICE — DRESSING FOAM DISK DIA1IN H 7MM HYDRPHLC CHG IMPREG IN SL

## (undated) DEVICE — SYSTEM INF CTRL

## (undated) DEVICE — (D)BNDG ADHESIVE FABRIC 3/4X3 -- DISC BY MFR USE ITEM 357960

## (undated) DEVICE — SUTURE GOR TX SZ 5-0 L30IN NONABSORBABLE L12MM TTC-12 3/8 6M10A

## (undated) DEVICE — STRIP,CLOSURE,WOUND,MEDI-STRIP,1/2X4: Brand: MEDLINE

## (undated) DEVICE — DECANTER BAG 9": Brand: MEDLINE INDUSTRIES, INC.

## (undated) DEVICE — ADHESIVE SKIN CLOSURE 0.7CC TOP MICROBIAL APPL DERMBND ADV

## (undated) DEVICE — SUTURE MCRYL SZ 4-0 L18IN ABSRB UD L19MM PS-2 3/8 CIR PRIM Y496G

## (undated) DEVICE — BLANKET WRM W40.2XL55.9IN IORT LO BODY + MISTRAL AIR

## (undated) DEVICE — PREP SKN CHLRAPRP 26ML TNT -- CONVERT TO ITEM 373320

## (undated) DEVICE — 3M™ IOBAN™ 2 ANTIMICROBIAL INCISE DRAPE 6651EZ: Brand: IOBAN™ 2

## (undated) DEVICE — Device

## (undated) DEVICE — APPLICATOR MEDICATED 26 CC SOLUTION HI LT ORNG CHLORAPREP

## (undated) DEVICE — TRAY PREP DRY W/ PREM GLV 2 APPL 6 SPNG 2 UNDPD 1 OVERWRAP

## (undated) DEVICE — NEEDLE HYPO 25GA L1.5IN BVL ORIENTED ECLIPSE

## (undated) DEVICE — ELECTRODE PT RET AD L9FT HI MOIST COND ADH HYDRGEL CORDED

## (undated) DEVICE — ELECTRO LUBE IS A SINGLE PATIENT USE DEVICE THAT IS INTENDED TO BE USED ON ELECTROSURGICAL ELECTRODES TO REDUCE STICKING.: Brand: KEY SURGICAL ELECTRO LUBE

## (undated) DEVICE — BLADELESS OBTURATOR: Brand: WECK VISTA

## (undated) DEVICE — PROBE VASC 8MHZ WTRPRF

## (undated) DEVICE — PACK PROCEDURE SURG VASC CATH 161 MMC LF

## (undated) DEVICE — GLOVE SURG SZ 7 L11.33IN FNGR THK9.8MIL STRW LTX POLYMER

## (undated) DEVICE — SEAL UNIV 5-8MM DISP BX/10 -- DA VINCI XI - SNGL USE

## (undated) DEVICE — TIP COVER ACCESSORY

## (undated) DEVICE — SOLUTION IV 1000ML 0.9% SOD CHL

## (undated) DEVICE — ARM DRAPE

## (undated) DEVICE — KIT CLN UP BON SECOURS MARYV

## (undated) DEVICE — MAYO STAND COVER: Brand: CONVERTORS

## (undated) DEVICE — SUTURE V-LOC 180 SZ 0 L9IN ABSRB GRN GS-21 L37MM 1/2 CIR VLOCL0346

## (undated) DEVICE — CATHETER ANGIO 7FR L100CM GWIRE 0.038IN CRV A HI FLO WVN SON

## (undated) DEVICE — SUTURE MCRYL SZ 4-0 L27IN ABSRB UD L24MM PS-1 3/8 CIR PRIM Y935H

## (undated) DEVICE — SUTURE MCRYL SZ 2-0 L36IN ABSRB UD L36MM CT-1 1/2 CIR Y945H

## (undated) DEVICE — COVER LT HNDL BLU STRL -- MEDICHOICE

## (undated) DEVICE — COVER US PRB W15XL120CM W/ GEL RUBBERBAND TAPE STRP FLD GEN

## (undated) DEVICE — COLUMN DRAPE

## (undated) DEVICE — GLOVE SURG BIOGEL 8.0 STRL -- SKINSENSE

## (undated) DEVICE — GLOVE SURG SZ 8 L11.77IN FNGR THK9.8MIL STRW LTX POLYMER

## (undated) DEVICE — SUTURE NONABSORBABLE MONOFILAMENT 6-0 C-1 1X30 IN PROLENE 8706H

## (undated) DEVICE — INTENDED FOR TISSUE SEPARATION, AND OTHER PROCEDURES THAT REQUIRE A SHARP SURGICAL BLADE TO PUNCTURE OR CUT.: Brand: BARD-PARKER ®  SAFETY SCALPED

## (undated) DEVICE — SUTURE MCRYL SZ 3-0 L27IN ABSRB UD L26MM SH 1/2 CIR Y416H

## (undated) DEVICE — DRAPE TOWEL: Brand: CONVERTORS

## (undated) DEVICE — APPLICATOR BNDG 1MM ADH PREMIERPRO EXOFIN

## (undated) DEVICE — STERILE POLYISOPRENE POWDER-FREE SURGICAL GLOVES: Brand: PROTEXIS

## (undated) DEVICE — INTENDED FOR TISSUE SEPARATION, AND OTHER PROCEDURES THAT REQUIRE A SHARP SURGICAL BLADE TO PUNCTURE OR CUT.: Brand: BARD-PARKER ® STAINLESS STEEL BLADES

## (undated) DEVICE — MASTISOL ADHESIVE LIQ 2/3ML

## (undated) DEVICE — TRAY,URINE METER,100% SILICONE,16FR10ML: Brand: MEDLINE

## (undated) DEVICE — SYR 10ML LUER LOK 1/5ML GRAD --

## (undated) DEVICE — DRAPE TWL SURG 16X26IN BLU ORB04] ALLCARE INC]

## (undated) DEVICE — INSTRMT SET WND CLSR SUT PASS --

## (undated) DEVICE — GLOVE SURG SZ 7.5 L11.73IN FNGR THK9.8MIL STRW LTX POLYMER

## (undated) DEVICE — CATHETER HAD L36CM INSRT L19CM PALINDROME

## (undated) DEVICE — SUTURE VCRL SZ 2-0 L27IN ABSRB VLT L26MM CT-2 1/2 CIR J333H

## (undated) DEVICE — REM POLYHESIVE ADULT PATIENT RETURN ELECTRODE: Brand: VALLEYLAB

## (undated) DEVICE — SUTURE PROL SZ 5-0 L36IN NONABSORBABLE BLU L13MM C-1 3/8 8720H